# Patient Record
Sex: FEMALE | Race: WHITE | NOT HISPANIC OR LATINO | URBAN - METROPOLITAN AREA
[De-identification: names, ages, dates, MRNs, and addresses within clinical notes are randomized per-mention and may not be internally consistent; named-entity substitution may affect disease eponyms.]

---

## 2020-07-16 ENCOUNTER — INPATIENT (INPATIENT)
Facility: HOSPITAL | Age: 80
LOS: 4 days | Discharge: HOME | DRG: 645 | End: 2020-07-21
Attending: INTERNAL MEDICINE | Admitting: INTERNAL MEDICINE
Payer: MEDICARE

## 2020-07-16 VITALS
HEIGHT: 61 IN | SYSTOLIC BLOOD PRESSURE: 166 MMHG | RESPIRATION RATE: 20 BRPM | WEIGHT: 162.04 LBS | HEART RATE: 71 BPM | OXYGEN SATURATION: 99 % | TEMPERATURE: 99 F | DIASTOLIC BLOOD PRESSURE: 77 MMHG

## 2020-07-16 DIAGNOSIS — E87.1 HYPO-OSMOLALITY AND HYPONATREMIA: ICD-10-CM

## 2020-07-16 LAB
ALBUMIN SERPL ELPH-MCNC: 3.7 G/DL — SIGNIFICANT CHANGE UP (ref 3.5–5)
ALP SERPL-CCNC: 33 U/L — LOW (ref 40–120)
ALT FLD-CCNC: 23 U/L DA — SIGNIFICANT CHANGE UP (ref 10–60)
ANION GAP SERPL CALC-SCNC: 10 MMOL/L — SIGNIFICANT CHANGE UP (ref 5–17)
ANION GAP SERPL CALC-SCNC: 11 MMOL/L — SIGNIFICANT CHANGE UP (ref 5–17)
APPEARANCE UR: CLEAR — SIGNIFICANT CHANGE UP
AST SERPL-CCNC: 33 U/L — SIGNIFICANT CHANGE UP (ref 10–40)
BACTERIA # UR AUTO: ABNORMAL /HPF
BASOPHILS # BLD AUTO: 0.01 K/UL — SIGNIFICANT CHANGE UP (ref 0–0.2)
BASOPHILS NFR BLD AUTO: 0.2 % — SIGNIFICANT CHANGE UP (ref 0–2)
BILIRUB SERPL-MCNC: 0.8 MG/DL — SIGNIFICANT CHANGE UP (ref 0.2–1.2)
BILIRUB UR-MCNC: NEGATIVE — SIGNIFICANT CHANGE UP
BUN SERPL-MCNC: 6 MG/DL — LOW (ref 7–18)
BUN SERPL-MCNC: 7 MG/DL — SIGNIFICANT CHANGE UP (ref 7–18)
CALCIUM SERPL-MCNC: 8.3 MG/DL — LOW (ref 8.4–10.5)
CALCIUM SERPL-MCNC: 8.5 MG/DL — SIGNIFICANT CHANGE UP (ref 8.4–10.5)
CHLORIDE SERPL-SCNC: 80 MMOL/L — LOW (ref 96–108)
CHLORIDE SERPL-SCNC: 80 MMOL/L — LOW (ref 96–108)
CO2 SERPL-SCNC: 23 MMOL/L — SIGNIFICANT CHANGE UP (ref 22–31)
CO2 SERPL-SCNC: 25 MMOL/L — SIGNIFICANT CHANGE UP (ref 22–31)
COLOR SPEC: YELLOW — SIGNIFICANT CHANGE UP
CREAT ?TM UR-MCNC: 32 MG/DL — SIGNIFICANT CHANGE UP
CREAT SERPL-MCNC: 0.68 MG/DL — SIGNIFICANT CHANGE UP (ref 0.5–1.3)
CREAT SERPL-MCNC: 0.7 MG/DL — SIGNIFICANT CHANGE UP (ref 0.5–1.3)
DIFF PNL FLD: ABNORMAL
EOSINOPHIL # BLD AUTO: 0.02 K/UL — SIGNIFICANT CHANGE UP (ref 0–0.5)
EOSINOPHIL NFR BLD AUTO: 0.3 % — SIGNIFICANT CHANGE UP (ref 0–6)
EPI CELLS # UR: SIGNIFICANT CHANGE UP /HPF
GLUCOSE SERPL-MCNC: 115 MG/DL — HIGH (ref 70–99)
GLUCOSE SERPL-MCNC: 120 MG/DL — HIGH (ref 70–99)
GLUCOSE UR QL: NEGATIVE — SIGNIFICANT CHANGE UP
HCT VFR BLD CALC: 30.9 % — LOW (ref 34.5–45)
HGB BLD-MCNC: 10.8 G/DL — LOW (ref 11.5–15.5)
IMM GRANULOCYTES NFR BLD AUTO: 0.5 % — SIGNIFICANT CHANGE UP (ref 0–1.5)
KETONES UR-MCNC: ABNORMAL
LEUKOCYTE ESTERASE UR-ACNC: NEGATIVE — SIGNIFICANT CHANGE UP
LIDOCAIN IGE QN: 315 U/L — SIGNIFICANT CHANGE UP (ref 73–393)
LYMPHOCYTES # BLD AUTO: 1.24 K/UL — SIGNIFICANT CHANGE UP (ref 1–3.3)
LYMPHOCYTES # BLD AUTO: 20.1 % — SIGNIFICANT CHANGE UP (ref 13–44)
MCHC RBC-ENTMCNC: 28.5 PG — SIGNIFICANT CHANGE UP (ref 27–34)
MCHC RBC-ENTMCNC: 35 GM/DL — SIGNIFICANT CHANGE UP (ref 32–36)
MCV RBC AUTO: 81.5 FL — SIGNIFICANT CHANGE UP (ref 80–100)
MONOCYTES # BLD AUTO: 0.54 K/UL — SIGNIFICANT CHANGE UP (ref 0–0.9)
MONOCYTES NFR BLD AUTO: 8.8 % — SIGNIFICANT CHANGE UP (ref 2–14)
NEUTROPHILS # BLD AUTO: 4.32 K/UL — SIGNIFICANT CHANGE UP (ref 1.8–7.4)
NEUTROPHILS NFR BLD AUTO: 70.1 % — SIGNIFICANT CHANGE UP (ref 43–77)
NITRITE UR-MCNC: NEGATIVE — SIGNIFICANT CHANGE UP
NRBC # BLD: 0 /100 WBCS — SIGNIFICANT CHANGE UP (ref 0–0)
OSMOLALITY UR: 317 MOS/KG — SIGNIFICANT CHANGE UP (ref 50–1200)
PH UR: 7 — SIGNIFICANT CHANGE UP (ref 5–8)
PLATELET # BLD AUTO: 266 K/UL — SIGNIFICANT CHANGE UP (ref 150–400)
POTASSIUM SERPL-MCNC: 3.1 MMOL/L — LOW (ref 3.5–5.3)
POTASSIUM SERPL-MCNC: 3.2 MMOL/L — LOW (ref 3.5–5.3)
POTASSIUM SERPL-SCNC: 3.1 MMOL/L — LOW (ref 3.5–5.3)
POTASSIUM SERPL-SCNC: 3.2 MMOL/L — LOW (ref 3.5–5.3)
PROT SERPL-MCNC: 7.4 G/DL — SIGNIFICANT CHANGE UP (ref 6–8.3)
PROT UR-MCNC: NEGATIVE — SIGNIFICANT CHANGE UP
RBC # BLD: 3.79 M/UL — LOW (ref 3.8–5.2)
RBC # FLD: 12.1 % — SIGNIFICANT CHANGE UP (ref 10.3–14.5)
RBC CASTS # UR COMP ASSIST: SIGNIFICANT CHANGE UP /HPF (ref 0–2)
SARS-COV-2 RNA SPEC QL NAA+PROBE: SIGNIFICANT CHANGE UP
SODIUM SERPL-SCNC: 114 MMOL/L — CRITICAL LOW (ref 135–145)
SODIUM SERPL-SCNC: 115 MMOL/L — CRITICAL LOW (ref 135–145)
SODIUM UR-SCNC: 77 MMOL/L — SIGNIFICANT CHANGE UP
SP GR SPEC: 1 — LOW (ref 1.01–1.02)
URATE SERPL-MCNC: 2.2 MG/DL — LOW (ref 2.5–7)
UROBILINOGEN FLD QL: NEGATIVE — SIGNIFICANT CHANGE UP
WBC # BLD: 6.16 K/UL — SIGNIFICANT CHANGE UP (ref 3.8–10.5)
WBC # FLD AUTO: 6.16 K/UL — SIGNIFICANT CHANGE UP (ref 3.8–10.5)
WBC UR QL: SIGNIFICANT CHANGE UP /HPF (ref 0–5)

## 2020-07-16 PROCEDURE — 74177 CT ABD & PELVIS W/CONTRAST: CPT | Mod: 26

## 2020-07-16 PROCEDURE — 71045 X-RAY EXAM CHEST 1 VIEW: CPT | Mod: 26,CS

## 2020-07-16 PROCEDURE — 99291 CRITICAL CARE FIRST HOUR: CPT

## 2020-07-16 RX ORDER — PANTOPRAZOLE SODIUM 20 MG/1
40 TABLET, DELAYED RELEASE ORAL DAILY
Refills: 0 | Status: DISCONTINUED | OUTPATIENT
Start: 2020-07-16 | End: 2020-07-17

## 2020-07-16 RX ORDER — TIMOLOL 0.5 %
1 DROPS OPHTHALMIC (EYE)
Refills: 0 | Status: DISCONTINUED | OUTPATIENT
Start: 2020-07-16 | End: 2020-07-21

## 2020-07-16 RX ORDER — ONDANSETRON 8 MG/1
4 TABLET, FILM COATED ORAL ONCE
Refills: 0 | Status: COMPLETED | OUTPATIENT
Start: 2020-07-16 | End: 2020-07-16

## 2020-07-16 RX ORDER — SODIUM,POTASSIUM PHOSPHATES 278-250MG
1 POWDER IN PACKET (EA) ORAL
Refills: 0 | Status: DISCONTINUED | OUTPATIENT
Start: 2020-07-16 | End: 2020-07-17

## 2020-07-16 RX ORDER — APIXABAN 2.5 MG/1
5 TABLET, FILM COATED ORAL EVERY 12 HOURS
Refills: 0 | Status: DISCONTINUED | OUTPATIENT
Start: 2020-07-16 | End: 2020-07-21

## 2020-07-16 RX ORDER — AMLODIPINE BESYLATE 2.5 MG/1
5 TABLET ORAL DAILY
Refills: 0 | Status: DISCONTINUED | OUTPATIENT
Start: 2020-07-16 | End: 2020-07-17

## 2020-07-16 RX ORDER — LEVOTHYROXINE SODIUM 125 MCG
50 TABLET ORAL DAILY
Refills: 0 | Status: DISCONTINUED | OUTPATIENT
Start: 2020-07-16 | End: 2020-07-21

## 2020-07-16 RX ORDER — SOTALOL HCL 120 MG
80 TABLET ORAL
Refills: 0 | Status: DISCONTINUED | OUTPATIENT
Start: 2020-07-16 | End: 2020-07-17

## 2020-07-16 RX ORDER — POTASSIUM CHLORIDE 20 MEQ
40 PACKET (EA) ORAL ONCE
Refills: 0 | Status: COMPLETED | OUTPATIENT
Start: 2020-07-16 | End: 2020-07-16

## 2020-07-16 RX ORDER — SODIUM CHLORIDE 9 MG/ML
2 INJECTION INTRAMUSCULAR; INTRAVENOUS; SUBCUTANEOUS ONCE
Refills: 0 | Status: COMPLETED | OUTPATIENT
Start: 2020-07-16 | End: 2020-07-16

## 2020-07-16 RX ORDER — SODIUM CHLORIDE 9 MG/ML
1000 INJECTION INTRAMUSCULAR; INTRAVENOUS; SUBCUTANEOUS ONCE
Refills: 0 | Status: COMPLETED | OUTPATIENT
Start: 2020-07-16 | End: 2020-07-16

## 2020-07-16 RX ORDER — ONDANSETRON 8 MG/1
4 TABLET, FILM COATED ORAL THREE TIMES A DAY
Refills: 0 | Status: DISCONTINUED | OUTPATIENT
Start: 2020-07-16 | End: 2020-07-21

## 2020-07-16 RX ORDER — LATANOPROST 0.05 MG/ML
1 SOLUTION/ DROPS OPHTHALMIC; TOPICAL AT BEDTIME
Refills: 0 | Status: DISCONTINUED | OUTPATIENT
Start: 2020-07-16 | End: 2020-07-21

## 2020-07-16 RX ORDER — SODIUM CHLORIDE 9 MG/ML
1000 INJECTION INTRAMUSCULAR; INTRAVENOUS; SUBCUTANEOUS
Refills: 0 | Status: DISCONTINUED | OUTPATIENT
Start: 2020-07-16 | End: 2020-07-17

## 2020-07-16 RX ORDER — HEPARIN SODIUM 5000 [USP'U]/ML
5000 INJECTION INTRAVENOUS; SUBCUTANEOUS EVERY 8 HOURS
Refills: 0 | Status: DISCONTINUED | OUTPATIENT
Start: 2020-07-16 | End: 2020-07-16

## 2020-07-16 RX ADMIN — ONDANSETRON 4 MILLIGRAM(S): 8 TABLET, FILM COATED ORAL at 19:42

## 2020-07-16 RX ADMIN — SODIUM CHLORIDE 60 MILLILITER(S): 9 INJECTION INTRAMUSCULAR; INTRAVENOUS; SUBCUTANEOUS at 23:30

## 2020-07-16 RX ADMIN — SODIUM CHLORIDE 1000 MILLILITER(S): 9 INJECTION INTRAMUSCULAR; INTRAVENOUS; SUBCUTANEOUS at 19:41

## 2020-07-16 RX ADMIN — Medication 40 MILLIEQUIVALENT(S): at 20:40

## 2020-07-16 NOTE — ED PROVIDER NOTE - CLINICAL SUMMARY MEDICAL DECISION MAKING FREE TEXT BOX
80 yo F with vomiting and weakness. Nonfocal exam. Labs remarkable for Na of 115. K of 3.1.  Given 100-200 cc of NS. Discussed with ICU and will admit patient. No prior history. Endorsed to Dr. Rosales.

## 2020-07-16 NOTE — H&P ADULT - HISTORY OF PRESENT ILLNESS
79 years old [place] Islander speaking female from home with PMHx of HTN (on Losartan), HLP ( Simvastatin), presented to ED with severe nausea and vomiting x 2 days. Patient ate 'simbosa' from outside 2 days ago after that she has been vomiting and not able to hold up anything. Patient admits to having decreased PO intake because of vomiting. Patient denies having history of low serum sodium level or any recent change in medications. Patient does have some dizziness because of vomiting.  Patient denies any chest pain, shortness of breath, abdominal pain, change in urinary or bowel habits.  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 s/p 500 ml IV fluids in ED. Urine lytes shows dilute urine. serum osmolality sent. Will repeat BMP. Nephrology consult dr willard. 79 years old [place] speaking female from home with PMHx of HTN (on Losartan), HLP ( Simvastatin), presented to ED with severe nausea and vomiting x 2 days. Patient ate 'simbosa' from outside 2 days ago after that she has been vomiting and not able to hold up anything. Patient admits to having decreased PO intake because of vomiting. Patient denies having history of low serum sodium level or any recent change in medications. Patient does have some dizziness because of vomiting.  Patient denies any chest pain, shortness of breath, abdominal pain, change in urinary or bowel habits.  Patient is being admitted with hyponatremia with serum sodium of 114 mg/dl likely secondary to SIADH due to vomiting vs medication induced (HCTZ can cause increased urine sodium). s/p 500 ml IV fluids in ED. Urine lytes shows dilute urine. serum osmolality sent. Will repeat BMP. Nephrology consult dr willard. will hold hydrochlorothiazide

## 2020-07-16 NOTE — H&P ADULT - NSHPLABSRESULTS_GEN_ALL_CORE
EKG: showed normal sinus rhythm. Some baseline motion artifact nnoted.  CXR : pending. ordered. EKG: showed normal sinus rhythm. Some baseline motion artifact noted.  CXR : shows cardiomegaly. but lungs are clear with no infiultrates

## 2020-07-16 NOTE — ED PROVIDER NOTE - OBJECTIVE STATEMENT
78 y/o F patient with a significant PMHx of HTN presents to the ED c/o non bloody non bilious emesis x2 days. Patient states she had an Eastern European baked good and felt sick. Patient denies any diarrhea, notes having normal bowel movement but relates having an episode of vomiting this morning. Patient states she feels generalized weakness. Denies fevers, nausea, chest pain, shortness of breath, abdominal pain, dysuria, hematuria, diarrhea, constipation, or any other acute complaints. Translation services for [place] [id number] 80 y/o F patient with a significant PMHx of HTN presents to the ED c/o non bloody non bilious emesis x2 days. Patient states she had an Eastern European baked good with cheese and subsequently was feeling ill after. Patient denies any diarrhea, notes having normal bowel movement but relates having an episode of vomiting this morning. Patient states she feels generalized weakness. Denies fevers, nausea, chest pain, shortness of breath, abdominal pain, dysuria, hematuria, diarrhea, constipation, or any other acute complaints. Translation services for Lithuanian was utilized [facility] [id number])

## 2020-07-16 NOTE — H&P ADULT - ASSESSMENT
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 s/p 500 ml IV fluids in ED. Urine lytes shows dilute urine. serum osmolality sent. Will repeat BMP. Nephrology consult dr willard.    Assessment:  1. 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  2. Rule out intra-abdominal pathology for vomiting.  3. Hyperlipidemia  4. Hypertension.      Plan:    CNS:  No active issues at baseline.  Patient is alert oriented x 3 on my assessment.  Patient only felt mildly dizzy with vomiting earlier, currently asymptomatic.  Monitor for any seizures, convulsion, or change in mental status.      CVS;  #History of hypertension.  takes losartan and one more medication at home (does not remember the name).  blood pressure is on higher side.  will resume losartan.  monitor blood pressure.  #History of hyperlipidemia.  continue simvastatin  f/u lipid panel.  EKG showed normal sinus rhthym , on ischemic changes.      Respiratory:  No active issues.  patient is saturating 96 % on room air.      GI:  will keep NPO.  Will advance diet once serum sodium improves.      Nephrology:  Patient is being admitted with hyponatremia with serum sodium of 114 mg/dl likely secondary to SIADH due to vomiting.   s/p 500 ml IV fluids in ED.   Urine lytes shows dilute urine.   serum osmolality sent.   Will repeat BMP now .   BMP q 4 hourly.  Nephrology consult dr willard.  hypokalemia and hypophosphoremia  replaced.      Heam/onc:  patient has anemia.  no active signs of bleeding and no baseline to compare.  will monitor CBC.      ID:  No active issues.  will do ct scan of abdomen to rule out intra-abdominal pathology for persistent vomiting.      DVT prophylaxis:  Heparin sc for DVT prophylaxis.  Iv protonix while NPO.    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 s/p 500 ml IV fluids in ED. Urine lytes shows dilute urine. serum osmolality sent. Will repeat BMP. Nephrology consult dr willard.    Assessment:  1. 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  2. Rule out intra-abdominal pathology for vomiting.  3. Hyperlipidemia  4. Hypertension.      Plan:    CNS:  No active issues at baseline.  Patient is alert oriented x 3 on my assessment.  Patient only felt mildly dizzy with vomiting earlier, currently asymptomatic.  Monitor for any seizures, convulsion, or change in mental status.      CVS;  #History of hypertension.  takes losartan-hctz and amlodipine at home.  blood pressure is on higher side.  will resume amlodipine.  will hold hydrochlorothiazide.  monitor blood pressure.  #History of hyperlipidemia.  continue simvastatin  f/u lipid panel.  EKG showed normal sinus rhthym , on ischemic changes.  Patient is on Eliquis for 'heart problem' per family.  will resume.      Respiratory:  No active issues.  patient is saturating 96 % on room air.      GI:  will keep NPO.  Will advance diet once serum sodium improves.      Nephrology:  Patient is being admitted with hyponatremia with serum sodium of 114 mg/dl likely secondary  SIADH due to vomiting vs medication induced (HCTZ can cause increased urine sodium).   Urine lytes shows dilute urine.   serum osmolality sent.   Will repeat BMP now .   BMP q 4 hourly.  Nephrology consult dr willard.  hypokalemia and hypophosphoremia  replaced.  will hold HCTZ      Heam/onc:  patient has anemia.  no active signs of bleeding and no baseline to compare.  will monitor CBC.      ID:  No active issues.  will do ct scan of abdomen to rule out intra-abdominal pathology for persistent vomiting.      DVT prophylaxis:  Heparin sc for DVT prophylaxis.  Iv protonix while NPO.    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 s/p 500 ml IV fluids in ED. Urine lytes shows dilute urine. serum osmolality sent. Will repeat BMP. Nephrology consult dr willard.    Assessment:  1. 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  2. Rule out intra-abdominal pathology for vomiting.  3. Hyperlipidemia  4. Hypertension.      Plan:    CNS:  No active issues at baseline.  Patient is alert oriented x 3 on my assessment.  Patient only felt mildly dizzy with vomiting earlier, currently asymptomatic.  Monitor for any seizures, convulsion, or change in mental status.      CVS;  #History of hypertension.  takes losartan-hctz and amlodipine at home.  blood pressure is on higher side.  will resume amlodipine.  will hold hydrochlorothiazide.  monitor blood pressure.  #History of hyperlipidemia.  continue simvastatin  f/u lipid panel.  EKG showed normal sinus rhthym , no ischemic changes.  Patient is on Eliquis for 'heart problem' per family.  will resume.      Respiratory:  No active issues.  patient is saturating 96 % on room air.      GI:  will keep NPO.  Will advance diet once serum sodium improves.      Nephrology:  Patient is being admitted with hyponatremia with serum sodium of 114 mg/dl likely secondary  SIADH due to vomiting vs medication induced (HCTZ can cause increased urine sodium).   Urine lytes shows dilute urine.   serum osmolality sent.   Will repeat BMP now .   BMP q 4 hourly.  Nephrology consult dr willard.  hypokalemia and hypophosphoremia  replaced.  will hold HCTZ      Heam/onc:  patient has anemia.  no active signs of bleeding and no baseline to compare.  will monitor CBC.      ID:  No active issues.  will do ct scan of abdomen to rule out intra-abdominal pathology for persistent vomiting.      DVT prophylaxis:  Heparin sc for DVT prophylaxis.  Iv protonix while NPO.    Dispo:  ICU

## 2020-07-16 NOTE — H&P ADULT - NSHPPHYSICALEXAM_GEN_ALL_CORE
Vital Signs Last 24 Hrs  T(C): 37.3 (16 Jul 2020 17:47), Max: 37.3 (16 Jul 2020 17:47)  T(F): 99.1 (16 Jul 2020 17:47), Max: 99.1 (16 Jul 2020 17:47)  HR: 71 (16 Jul 2020 17:47) (71 - 71)  BP: 166/77 (16 Jul 2020 17:47) (166/77 - 166/77)  BP(mean): --  RR: 20 (16 Jul 2020 17:47) (20 - 20)  SpO2: 99% (16 Jul 2020 17:47) (99% - 99%)  PHYSICAL EXAM:  GENERAL: NAD, speaks in full sentences, no signs of respiratory distress  HEAD:  Atraumatic, Normocephalic  EYES: EOMI, PERRLA, conjunctiva and sclera clear  NECK: Supple, No JVD  CHEST/LUNG: Clear to auscultation bilaterally; No wheeze; No crackles; No accessory muscles used  HEART: Regular rate and rhythm; No murmurs;   ABDOMEN: Soft, Nontender, Nondistended; Bowel sounds present; No guarding  EXTREMITIES:  2+ Peripheral Pulses, edema +1  PSYCH: AAOx3  NEUROLOGY: non-focal  SKIN: No rashes or lesions

## 2020-07-16 NOTE — H&P ADULT - ATTENDING COMMENTS
MICU ATTENDING. I have personally seen and examined the pt.  I was physically present for the key elements service provided. I agree with above history, physical and plan which I edited where appropriate. I total spent 35 min critical care time.   80 y/o with PMH of HTN and HLD presented with nausea and vomiting and hypoNa.   A/P hypoNa 2 to hypovolemia, medication induced vs SIADH    nausea, vomiting r/o intraabd pathology  HTN  hydration   Monitor serum Na closely   avoid rapid correction  nephrology input appreciated  CT abd  protonix, Zofran  Hold HCTZ and Losartan  Cont other meds

## 2020-07-17 LAB
A1C WITH ESTIMATED AVERAGE GLUCOSE RESULT: 5.7 % — HIGH (ref 4–5.6)
ALBUMIN SERPL ELPH-MCNC: 3.5 G/DL — SIGNIFICANT CHANGE UP (ref 3.5–5)
ALP SERPL-CCNC: 35 U/L — LOW (ref 40–120)
ALT FLD-CCNC: 32 U/L DA — SIGNIFICANT CHANGE UP (ref 10–60)
ANION GAP SERPL CALC-SCNC: 5 MMOL/L — SIGNIFICANT CHANGE UP (ref 5–17)
ANION GAP SERPL CALC-SCNC: 6 MMOL/L — SIGNIFICANT CHANGE UP (ref 5–17)
ANION GAP SERPL CALC-SCNC: 7 MMOL/L — SIGNIFICANT CHANGE UP (ref 5–17)
ANION GAP SERPL CALC-SCNC: 7 MMOL/L — SIGNIFICANT CHANGE UP (ref 5–17)
ANION GAP SERPL CALC-SCNC: 8 MMOL/L — SIGNIFICANT CHANGE UP (ref 5–17)
AST SERPL-CCNC: 48 U/L — HIGH (ref 10–40)
BILIRUB SERPL-MCNC: 0.5 MG/DL — SIGNIFICANT CHANGE UP (ref 0.2–1.2)
BUN SERPL-MCNC: 15 MG/DL — SIGNIFICANT CHANGE UP (ref 7–18)
BUN SERPL-MCNC: 16 MG/DL — SIGNIFICANT CHANGE UP (ref 7–18)
BUN SERPL-MCNC: 4 MG/DL — LOW (ref 7–18)
BUN SERPL-MCNC: 5 MG/DL — LOW (ref 7–18)
BUN SERPL-MCNC: 8 MG/DL — SIGNIFICANT CHANGE UP (ref 7–18)
CALCIUM SERPL-MCNC: 8.3 MG/DL — LOW (ref 8.4–10.5)
CALCIUM SERPL-MCNC: 8.3 MG/DL — LOW (ref 8.4–10.5)
CALCIUM SERPL-MCNC: 8.6 MG/DL — SIGNIFICANT CHANGE UP (ref 8.4–10.5)
CALCIUM SERPL-MCNC: 8.6 MG/DL — SIGNIFICANT CHANGE UP (ref 8.4–10.5)
CALCIUM SERPL-MCNC: 8.8 MG/DL — SIGNIFICANT CHANGE UP (ref 8.4–10.5)
CHLORIDE SERPL-SCNC: 100 MMOL/L — SIGNIFICANT CHANGE UP (ref 96–108)
CHLORIDE SERPL-SCNC: 91 MMOL/L — LOW (ref 96–108)
CHLORIDE SERPL-SCNC: 94 MMOL/L — LOW (ref 96–108)
CHLORIDE SERPL-SCNC: 95 MMOL/L — LOW (ref 96–108)
CHLORIDE SERPL-SCNC: 99 MMOL/L — SIGNIFICANT CHANGE UP (ref 96–108)
CHOLEST SERPL-MCNC: 198 MG/DL — SIGNIFICANT CHANGE UP (ref 10–199)
CO2 SERPL-SCNC: 25 MMOL/L — SIGNIFICANT CHANGE UP (ref 22–31)
CO2 SERPL-SCNC: 26 MMOL/L — SIGNIFICANT CHANGE UP (ref 22–31)
CO2 SERPL-SCNC: 26 MMOL/L — SIGNIFICANT CHANGE UP (ref 22–31)
CO2 SERPL-SCNC: 27 MMOL/L — SIGNIFICANT CHANGE UP (ref 22–31)
CO2 SERPL-SCNC: 28 MMOL/L — SIGNIFICANT CHANGE UP (ref 22–31)
CREAT ?TM UR-MCNC: <13 MG/DL — SIGNIFICANT CHANGE UP
CREAT SERPL-MCNC: 0.64 MG/DL — SIGNIFICANT CHANGE UP (ref 0.5–1.3)
CREAT SERPL-MCNC: 0.7 MG/DL — SIGNIFICANT CHANGE UP (ref 0.5–1.3)
CREAT SERPL-MCNC: 0.73 MG/DL — SIGNIFICANT CHANGE UP (ref 0.5–1.3)
CREAT SERPL-MCNC: 0.78 MG/DL — SIGNIFICANT CHANGE UP (ref 0.5–1.3)
CREAT SERPL-MCNC: 0.92 MG/DL — SIGNIFICANT CHANGE UP (ref 0.5–1.3)
ESTIMATED AVERAGE GLUCOSE: 117 MG/DL — HIGH (ref 68–114)
GLUCOSE SERPL-MCNC: 105 MG/DL — HIGH (ref 70–99)
GLUCOSE SERPL-MCNC: 106 MG/DL — HIGH (ref 70–99)
GLUCOSE SERPL-MCNC: 91 MG/DL — SIGNIFICANT CHANGE UP (ref 70–99)
GLUCOSE SERPL-MCNC: 95 MG/DL — SIGNIFICANT CHANGE UP (ref 70–99)
GLUCOSE SERPL-MCNC: 95 MG/DL — SIGNIFICANT CHANGE UP (ref 70–99)
HCT VFR BLD CALC: 32.8 % — LOW (ref 34.5–45)
HDLC SERPL-MCNC: 77 MG/DL — SIGNIFICANT CHANGE UP
HGB BLD-MCNC: 11.7 G/DL — SIGNIFICANT CHANGE UP (ref 11.5–15.5)
LIPID PNL WITH DIRECT LDL SERPL: 108 MG/DL — SIGNIFICANT CHANGE UP
MAGNESIUM SERPL-MCNC: 2 MG/DL — SIGNIFICANT CHANGE UP (ref 1.6–2.6)
MAGNESIUM SERPL-MCNC: 2.3 MG/DL — SIGNIFICANT CHANGE UP (ref 1.6–2.6)
MAGNESIUM SERPL-MCNC: 2.3 MG/DL — SIGNIFICANT CHANGE UP (ref 1.6–2.6)
MCHC RBC-ENTMCNC: 29 PG — SIGNIFICANT CHANGE UP (ref 27–34)
MCHC RBC-ENTMCNC: 35.7 GM/DL — SIGNIFICANT CHANGE UP (ref 32–36)
MCV RBC AUTO: 81.2 FL — SIGNIFICANT CHANGE UP (ref 80–100)
NRBC # BLD: 0 /100 WBCS — SIGNIFICANT CHANGE UP (ref 0–0)
OSMOLALITY SERPL: 259 MOSMOL/KG — LOW (ref 280–301)
OSMOLALITY UR: 102 MOS/KG — SIGNIFICANT CHANGE UP (ref 50–1200)
PHOSPHATE SERPL-MCNC: 1.9 MG/DL — LOW (ref 2.5–4.5)
PHOSPHATE SERPL-MCNC: 1.9 MG/DL — LOW (ref 2.5–4.5)
PHOSPHATE SERPL-MCNC: 2.1 MG/DL — LOW (ref 2.5–4.5)
PLATELET # BLD AUTO: 264 K/UL — SIGNIFICANT CHANGE UP (ref 150–400)
POTASSIUM SERPL-MCNC: 3.4 MMOL/L — LOW (ref 3.5–5.3)
POTASSIUM SERPL-MCNC: 3.6 MMOL/L — SIGNIFICANT CHANGE UP (ref 3.5–5.3)
POTASSIUM SERPL-MCNC: 4.5 MMOL/L — SIGNIFICANT CHANGE UP (ref 3.5–5.3)
POTASSIUM SERPL-MCNC: 4.8 MMOL/L — SIGNIFICANT CHANGE UP (ref 3.5–5.3)
POTASSIUM SERPL-MCNC: 4.8 MMOL/L — SIGNIFICANT CHANGE UP (ref 3.5–5.3)
POTASSIUM SERPL-SCNC: 3.4 MMOL/L — LOW (ref 3.5–5.3)
POTASSIUM SERPL-SCNC: 3.6 MMOL/L — SIGNIFICANT CHANGE UP (ref 3.5–5.3)
POTASSIUM SERPL-SCNC: 4.5 MMOL/L — SIGNIFICANT CHANGE UP (ref 3.5–5.3)
POTASSIUM SERPL-SCNC: 4.8 MMOL/L — SIGNIFICANT CHANGE UP (ref 3.5–5.3)
POTASSIUM SERPL-SCNC: 4.8 MMOL/L — SIGNIFICANT CHANGE UP (ref 3.5–5.3)
PROT SERPL-MCNC: 7.1 G/DL — SIGNIFICANT CHANGE UP (ref 6–8.3)
RBC # BLD: 4.04 M/UL — SIGNIFICANT CHANGE UP (ref 3.8–5.2)
RBC # FLD: 12.2 % — SIGNIFICANT CHANGE UP (ref 10.3–14.5)
SODIUM SERPL-SCNC: 124 MMOL/L — LOW (ref 135–145)
SODIUM SERPL-SCNC: 127 MMOL/L — LOW (ref 135–145)
SODIUM SERPL-SCNC: 129 MMOL/L — LOW (ref 135–145)
SODIUM SERPL-SCNC: 132 MMOL/L — LOW (ref 135–145)
SODIUM SERPL-SCNC: 132 MMOL/L — LOW (ref 135–145)
SODIUM UR-SCNC: 26 MMOL/L — SIGNIFICANT CHANGE UP
TOTAL CHOLESTEROL/HDL RATIO MEASUREMENT: 2.6 RATIO — LOW (ref 3.3–7.1)
TRIGL SERPL-MCNC: 67 MG/DL — SIGNIFICANT CHANGE UP (ref 10–149)
VIT B12 SERPL-MCNC: 530 PG/ML — SIGNIFICANT CHANGE UP (ref 232–1245)
WBC # BLD: 6.46 K/UL — SIGNIFICANT CHANGE UP (ref 3.8–10.5)
WBC # FLD AUTO: 6.46 K/UL — SIGNIFICANT CHANGE UP (ref 3.8–10.5)

## 2020-07-17 PROCEDURE — 99222 1ST HOSP IP/OBS MODERATE 55: CPT | Mod: GC

## 2020-07-17 RX ORDER — POTASSIUM PHOSPHATE, MONOBASIC POTASSIUM PHOSPHATE, DIBASIC 236; 224 MG/ML; MG/ML
15 INJECTION, SOLUTION INTRAVENOUS ONCE
Refills: 0 | Status: DISCONTINUED | OUTPATIENT
Start: 2020-07-17 | End: 2020-07-17

## 2020-07-17 RX ORDER — PHENYLEPHRINE HYDROCHLORIDE 10 MG/ML
0.3 INJECTION INTRAVENOUS
Qty: 40 | Refills: 0 | Status: DISCONTINUED | OUTPATIENT
Start: 2020-07-17 | End: 2020-07-18

## 2020-07-17 RX ORDER — SOTALOL HCL 120 MG
80 TABLET ORAL
Refills: 0 | Status: DISCONTINUED | OUTPATIENT
Start: 2020-07-17 | End: 2020-07-21

## 2020-07-17 RX ORDER — POTASSIUM PHOSPHATE, MONOBASIC POTASSIUM PHOSPHATE, DIBASIC 236; 224 MG/ML; MG/ML
15 INJECTION, SOLUTION INTRAVENOUS ONCE
Refills: 0 | Status: COMPLETED | OUTPATIENT
Start: 2020-07-17 | End: 2020-07-17

## 2020-07-17 RX ORDER — DESMOPRESSIN ACETATE 0.1 MG/1
2 TABLET ORAL ONCE
Refills: 0 | Status: COMPLETED | OUTPATIENT
Start: 2020-07-17 | End: 2020-07-17

## 2020-07-17 RX ORDER — SODIUM CHLORIDE 9 MG/ML
1000 INJECTION, SOLUTION INTRAVENOUS
Refills: 0 | Status: DISCONTINUED | OUTPATIENT
Start: 2020-07-17 | End: 2020-07-18

## 2020-07-17 RX ORDER — POTASSIUM CHLORIDE 20 MEQ
40 PACKET (EA) ORAL EVERY 4 HOURS
Refills: 0 | Status: COMPLETED | OUTPATIENT
Start: 2020-07-17 | End: 2020-07-17

## 2020-07-17 RX ORDER — CHLORHEXIDINE GLUCONATE 213 G/1000ML
1 SOLUTION TOPICAL
Refills: 0 | Status: DISCONTINUED | OUTPATIENT
Start: 2020-07-17 | End: 2020-07-18

## 2020-07-17 RX ADMIN — Medication 1 PACKET(S): at 01:20

## 2020-07-17 RX ADMIN — Medication 40 MILLIEQUIVALENT(S): at 06:31

## 2020-07-17 RX ADMIN — Medication 50 MICROGRAM(S): at 06:17

## 2020-07-17 RX ADMIN — Medication 1 PACKET(S): at 06:18

## 2020-07-17 RX ADMIN — Medication 1 DROP(S): at 06:18

## 2020-07-17 RX ADMIN — APIXABAN 5 MILLIGRAM(S): 2.5 TABLET, FILM COATED ORAL at 18:59

## 2020-07-17 RX ADMIN — Medication 40 MILLIEQUIVALENT(S): at 11:17

## 2020-07-17 RX ADMIN — Medication 1 DROP(S): at 18:36

## 2020-07-17 RX ADMIN — SODIUM CHLORIDE 150 MILLILITER(S): 9 INJECTION, SOLUTION INTRAVENOUS at 04:20

## 2020-07-17 RX ADMIN — SODIUM CHLORIDE 150 MILLILITER(S): 9 INJECTION, SOLUTION INTRAVENOUS at 23:00

## 2020-07-17 RX ADMIN — POTASSIUM PHOSPHATE, MONOBASIC POTASSIUM PHOSPHATE, DIBASIC 62.5 MILLIMOLE(S): 236; 224 INJECTION, SOLUTION INTRAVENOUS at 17:49

## 2020-07-17 RX ADMIN — SODIUM CHLORIDE 2 GRAM(S): 9 INJECTION INTRAMUSCULAR; INTRAVENOUS; SUBCUTANEOUS at 01:21

## 2020-07-17 RX ADMIN — APIXABAN 5 MILLIGRAM(S): 2.5 TABLET, FILM COATED ORAL at 06:18

## 2020-07-17 RX ADMIN — Medication 80 MILLIGRAM(S): at 06:18

## 2020-07-17 RX ADMIN — DESMOPRESSIN ACETATE 2 MICROGRAM(S): 0.1 TABLET ORAL at 16:54

## 2020-07-17 RX ADMIN — LATANOPROST 1 DROP(S): 0.05 SOLUTION/ DROPS OPHTHALMIC; TOPICAL at 22:59

## 2020-07-17 RX ADMIN — AMLODIPINE BESYLATE 5 MILLIGRAM(S): 2.5 TABLET ORAL at 01:19

## 2020-07-17 NOTE — DIETITIAN INITIAL EVALUATION ADULT. - OTHER INFO
Pt seen, adult granddaughter provides info. No weight changes, no chew/ swallowing issues, NKFA, no food requests. Discussed diet order (discussed diet with PGY 2, maintaining current order).

## 2020-07-17 NOTE — DIETITIAN INITIAL EVALUATION ADULT. - PERTINENT LABORATORY DATA
07-17 Na129 mmol/L<L> Glu 105 mg/dL<H> K+ 4.5 mmol/L Cr  0.73 mg/dL BUN 8 mg/dL 07-17 Phos 1.9 mg/dL<L> 07-17 Alb 3.5 g/dL 07-17 Chol 198 mg/dL  mg/dL HDL 77 mg/dL Trig 67 mg/dL

## 2020-07-17 NOTE — PROGRESS NOTE ADULT - SUBJECTIVE AND OBJECTIVE BOX
INTERVAL HPI/OVERNIGHT EVENTS: ***      Antimicrobial:    Cardiovascular:  amLODIPine   Tablet 5 milliGRAM(s) Oral daily  sotalol 80 milliGRAM(s) Oral two times a day    Pulmonary:    Hematalogic:  apixaban 5 milliGRAM(s) Oral every 12 hours    Other:  dextrose 5%. 1000 milliLiter(s) IV Continuous <Continuous>  latanoprost 0.005% Ophthalmic Solution 1 Drop(s) Both EYES at bedtime  levothyroxine 50 MICROGram(s) Oral daily  ondansetron Injectable 4 milliGRAM(s) IV Push three times a day PRN  pantoprazole  Injectable 40 milliGRAM(s) IV Push daily  potassium chloride    Tablet ER 40 milliEquivalent(s) Oral every 4 hours  potassium phosphate / sodium phosphate Powder (PHOS-NaK) 1 Packet(s) Oral four times a day  timolol 0.5% Solution 1 Drop(s) Both EYES two times a day    amLODIPine   Tablet 5 milliGRAM(s) Oral daily  apixaban 5 milliGRAM(s) Oral every 12 hours  dextrose 5%. 1000 milliLiter(s) IV Continuous <Continuous>  latanoprost 0.005% Ophthalmic Solution 1 Drop(s) Both EYES at bedtime  levothyroxine 50 MICROGram(s) Oral daily  ondansetron Injectable 4 milliGRAM(s) IV Push three times a day PRN  pantoprazole  Injectable 40 milliGRAM(s) IV Push daily  potassium chloride    Tablet ER 40 milliEquivalent(s) Oral every 4 hours  potassium phosphate / sodium phosphate Powder (PHOS-NaK) 1 Packet(s) Oral four times a day  sotalol 80 milliGRAM(s) Oral two times a day  timolol 0.5% Solution 1 Drop(s) Both EYES two times a day    Drug Dosing Weight  Height (cm): 154.94 (2020 17:47)  Weight (kg): 72.7 (2020 23:30)  BMI (kg/m2): 30.3 (2020 23:30)  BSA (m2): 1.72 (2020 23:30)    PRESSORS: [ ] YES [ ]No  WHICH:    CENTRAL LINE: [ ] YES [ ] NO  LOCATION:   DATE INSERTED:  REMOVE: [ ] YES [ ] NO  EXPLAIN:    ARROYO: [ ] YES [ ] NO    DATE INSERTED:  REMOVE:  [ ] YES [ ] NO  EXPLAIN:    A-LINE:  [ ] YES [ ] NO  LOCATION:   DATE INSERTED:  REMOVE:  [ ] YES [ ] NO  EXPLAIN:    PMH -reviewed admission note, no change since admission  PAST MEDICAL & SURGICAL HISTORY:  HTN (hypertension)  No significant past surgical history      ICU Vital Signs Last 24 Hrs  T(C): 36.3 (2020 05:13), Max: 37.3 (2020 17:47)  T(F): 97.3 (2020 05:13), Max: 99.1 (2020 17:47)  HR: 64 (2020 07:00) (63 - 75)  BP: 125/60 (2020 07:00) (111/61 - 180/66)  BP(mean): 75 (2020 07:00) (63 - 115)  ABP: --  ABP(mean): --  RR: 16 (2020 07:00) (12 - 29)  SpO2: 96% (2020 07:00) (96% - 100%)            07-16 @ 07:01  -  -17 @ 07:00  --------------------------------------------------------  IN: 750 mL / OUT: 750 mL / NET: 0 mL            PHYSICAL EXAM:    GENERAL: NAD, well-groomed, well-developed  HEAD:  Atraumatic, Normocephalic  EYES: EOMI, PERRLA, conjunctiva and sclera clear  ENMT: No tonsillar erythema, exudates, or enlargement; Moist mucous membranes, Good dentition, No lesions  NECK: Supple, normal appearance, No JVD; Normal thyroid; Trachea midline  NERVOUS SYSTEM:  Alert & Oriented X3, Good concentration; Motor Strength 5/5 B/L upper and lower extremities; DTRs 2+ intact and symmetric  CHEST/LUNG: No chest deformity; Normal percussion bilaterally; No rales, rhonchi, wheezing   HEART: Regular rate and rhythm; No murmurs, rubs, or gallops  ABDOMEN: Soft, Nontender, Nondistended; Bowel sounds present  EXTREMITIES:  2+ Peripheral Pulses, No clubbing, cyanosis, or edema  LYMPH: No lymphadenopathy noted  SKIN: No rashes or lesions; Good capillary refill      LABS:  CBC Full  -  ( 2020 03:46 )  WBC Count : 6.46 K/uL  RBC Count : 4.04 M/uL  Hemoglobin : 11.7 g/dL  Hematocrit : 32.8 %  Platelet Count - Automated : 264 K/uL  Mean Cell Volume : 81.2 fl  Mean Cell Hemoglobin : 29.0 pg  Mean Cell Hemoglobin Concentration : 35.7 gm/dL  Auto Neutrophil # : x  Auto Lymphocyte # : x  Auto Monocyte # : x  Auto Eosinophil # : x  Auto Basophil # : x  Auto Neutrophil % : x  Auto Lymphocyte % : x  Auto Monocyte % : x  Auto Eosinophil % : x  Auto Basophil % : x    07-    124<L>  |  91<L>  |  5<L>  ----------------------------<  95  3.4<L>   |  25  |  0.70    Ca    8.3<L>      2020 03:46  Phos  1.9       Mg     2.0         TPro  7.4  /  Alb  3.7  /  TBili  0.8  /  DBili  x   /  AST  33  /  ALT  23  /  AlkPhos  33<L>  07-16      Urinalysis Basic - ( 2020 20:15 )    Color: Yellow / Appearance: Clear / S.005 / pH: x  Gluc: x / Ketone: Moderate  / Bili: Negative / Urobili: Negative   Blood: x / Protein: Negative / Nitrite: Negative   Leuk Esterase: Negative / RBC: 0-2 /HPF / WBC 0-2 /HPF   Sq Epi: x / Non Sq Epi: Few /HPF / Bacteria: Trace /HPF          RADIOLOGY & ADDITIONAL STUDIES REVIEWED:  ***    [ ]GOALS OF CARE DISCUSSION WITH PATIENT/FAMILY/PROXY:    CRITICAL CARE TIME SPENT: 35 minutes INTERVAL HPI/OVERNIGHT EVENTS:     - Pt was examed via Martiniquais  Rayo #[id number]  - pt denied n/v or headache. Pt is A&O x3, no abd pain      Antimicrobial:    Cardiovascular:  amLODIPine   Tablet 5 milliGRAM(s) Oral daily  sotalol 80 milliGRAM(s) Oral two times a day    Pulmonary:    Hematalogic:  apixaban 5 milliGRAM(s) Oral every 12 hours    Other:  dextrose 5%. 1000 milliLiter(s) IV Continuous <Continuous>  latanoprost 0.005% Ophthalmic Solution 1 Drop(s) Both EYES at bedtime  levothyroxine 50 MICROGram(s) Oral daily  ondansetron Injectable 4 milliGRAM(s) IV Push three times a day PRN  pantoprazole  Injectable 40 milliGRAM(s) IV Push daily  potassium chloride    Tablet ER 40 milliEquivalent(s) Oral every 4 hours  potassium phosphate / sodium phosphate Powder (PHOS-NaK) 1 Packet(s) Oral four times a day  timolol 0.5% Solution 1 Drop(s) Both EYES two times a day    amLODIPine   Tablet 5 milliGRAM(s) Oral daily  apixaban 5 milliGRAM(s) Oral every 12 hours  dextrose 5%. 1000 milliLiter(s) IV Continuous <Continuous>  latanoprost 0.005% Ophthalmic Solution 1 Drop(s) Both EYES at bedtime  levothyroxine 50 MICROGram(s) Oral daily  ondansetron Injectable 4 milliGRAM(s) IV Push three times a day PRN  pantoprazole  Injectable 40 milliGRAM(s) IV Push daily  potassium chloride    Tablet ER 40 milliEquivalent(s) Oral every 4 hours  potassium phosphate / sodium phosphate Powder (PHOS-NaK) 1 Packet(s) Oral four times a day  sotalol 80 milliGRAM(s) Oral two times a day  timolol 0.5% Solution 1 Drop(s) Both EYES two times a day    Drug Dosing Weight  Height (cm): 154.94 (2020 17:47)  Weight (kg): 72.7 (2020 23:30)  BMI (kg/m2): 30.3 (2020 23:30)  BSA (m2): 1.72 (2020 23:30)    PRESSORS: [ ] YES [x ]No  WHICH:    CENTRAL LINE: [ ] YES [x ] NO  LOCATION:   DATE INSERTED:  REMOVE: [ ] YES [ ] NO  EXPLAIN:    ARROYO: [ ] YES [x ] NO    DATE INSERTED:  REMOVE:  [ ] YES [ ] NO  EXPLAIN:    A-LINE:  [ ] YES [x ] NO  LOCATION:   DATE INSERTED:  REMOVE:  [ ] YES [ ] NO  EXPLAIN:    PMH -reviewed admission note, no change since admission  PAST MEDICAL & SURGICAL HISTORY:  HTN (hypertension)  No significant past surgical history      ICU Vital Signs Last 24 Hrs  T(C): 36.3 (2020 05:13), Max: 37.3 (2020 17:47)  T(F): 97.3 (2020 05:13), Max: 99.1 (2020 17:47)  HR: 64 (2020 07:00) (63 - 75)  BP: 125/60 (2020 07:00) (111/61 - 180/66)  BP(mean): 75 (2020 07:00) (63 - 115)  ABP: --  ABP(mean): --  RR: 16 (2020 07:00) (12 - 29)  SpO2: 96% (2020 07:00) (96% - 100%)            -16 @ 07:01  -  -17 @ 07:00  --------------------------------------------------------  IN: 750 mL / OUT: 750 mL / NET: 0 mL            PHYSICAL EXAM:    GENERAL: NAD, well-groomed, obese  HEAD:  Atraumatic, Normocephalic  EYES: EOMI, PERRLA, conjunctiva and sclera clear  ENMT: No tonsillar erythema, exudates, or enlargement; Moist mucous membranes, Good dentition, No lesions  NECK: Supple, normal appearance, No JVD; Normal thyroid; Trachea midline  NERVOUS SYSTEM:  Alert & Oriented X3, Good concentration; Motor Strength 5/5 B/L upper and lower extremities  CHEST/LUNG: No chest deformity; Normal percussion bilaterally; No rales, rhonchi, wheezing   HEART: Regular rate and rhythm; No murmurs, rubs, or gallops  ABDOMEN: Soft, Nontender, Nondistended; Bowel sounds decreased but present  EXTREMITIES:  2+ Peripheral Pulses, No clubbing, cyanosis, or edema  LYMPH: No lymphadenopathy noted  SKIN: No rashes or lesions; Good capillary refill      LABS:  CBC Full  -  ( 2020 03:46 )  WBC Count : 6.46 K/uL  RBC Count : 4.04 M/uL  Hemoglobin : 11.7 g/dL  Hematocrit : 32.8 %  Platelet Count - Automated : 264 K/uL  Mean Cell Volume : 81.2 fl  Mean Cell Hemoglobin : 29.0 pg  Mean Cell Hemoglobin Concentration : 35.7 gm/dL  Auto Neutrophil # : x  Auto Lymphocyte # : x  Auto Monocyte # : x  Auto Eosinophil # : x  Auto Basophil # : x  Auto Neutrophil % : x  Auto Lymphocyte % : x  Auto Monocyte % : x  Auto Eosinophil % : x  Auto Basophil % : x        124<L>  |  91<L>  |  5<L>  ----------------------------<  95  3.4<L>   |  25  |  0.70    Ca    8.3<L>      2020 03:46  Phos  1.9       Mg     2.0         TPro  7.4  /  Alb  3.7  /  TBili  0.8  /  DBili  x   /  AST  33  /  ALT  23  /  AlkPhos  33<L>        Urinalysis Basic - ( 2020 20:15 )    Color: Yellow / Appearance: Clear / S.005 / pH: x  Gluc: x / Ketone: Moderate  / Bili: Negative / Urobili: Negative   Blood: x / Protein: Negative / Nitrite: Negative   Leuk Esterase: Negative / RBC: 0-2 /HPF / WBC 0-2 /HPF   Sq Epi: x / Non Sq Epi: Few /HPF / Bacteria: Trace /HPF          RADIOLOGY & ADDITIONAL STUDIES REVIEWED:       < from: CT Abdomen and Pelvis w/ IV Cont (20 @ 23:18) >  FINDINGS:  LOWER CHEST: Within normal limits.    LIVER: Scattered hypodense lesions, likely cysts. .   BILE DUCTS: Normal caliber.  GALLBLADDER: Within normal limits.  SPLEEN: Within normal limits.  PANCREAS: Within normal limits.  ADRENALS: Within normal limits.  KIDNEYS/URETERS: Within normal limits.    BLADDER: Within normal limits.  REPRODUCTIVE ORGANS: Within normal limits.    BOWEL: No bowel obstruction. Appendix is not visualized. Colonic diverticulosis without diverticulitis.  PERITONEUM: No ascites. Scattered metallic densities around the gallbladder and in the right lower quadrant (4-24, 4-32)  VESSELS: Moderate atherosclerotic calcification.  RETROPERITONEUM/LYMPH NODES: No lymphadenopathy.    ABDOMINAL WALL: Within normal limits.  BONES: Diffuse osteopenia. Accentuated lumbar lordosis. Severe facet joint arthrosis at L5-S1.    IMPRESSION: No acute intra-abdominal abnormality to elucidate the presenting symptoms.      < end of copied text >      [ ]GOALS OF CARE DISCUSSION WITH PATIENT/FAMILY/PROXY:    CRITICAL CARE TIME SPENT: 35 minutes

## 2020-07-17 NOTE — CONSULT NOTE ADULT - ASSESSMENT
Patient is a 80yo Wallisian speaking Female with HTN on Losartan/ HCTZ, HLD p/w n/v x 2 days. Pt found to have serum Na 115. Nephrology consulted for hyponatremia    1. Hyponatremia- likely hypovolemic hyponatremia due to n/v and HCTZ use. Agree with holding HCTZ. Now with rapid increasing in serum Na for which pt was started on D5 IVF. Recc to check BMP q 4hrs; goal serum Na 120-123. TSH wnl. Recc to repeat serum osm, urine osm and urine Na in am. Monitor serum Na    2. Hypokalemia- pt give KCL PO repletion. Repleting potassium will also increase serum Na. Monitor lytes.     3. Hypophosphatemia- pt given Kphos IV x1. Monitor serum phos    4. HTN- BP acceptable. c/w Losartan. Recc to hold HCTZ due to hyponatremia. Monitor BP

## 2020-07-17 NOTE — PROGRESS NOTE ADULT - ASSESSMENT
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 s/p 500 ml IV fluids in ED. Urine lytes shows dilute urine. serum osmolality sent. Will repeat BMP. Nephrology consult dr willard.    Assessment:  1. 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  2. Rule out intra-abdominal pathology for vomiting.  3. Hyperlipidemia  4. Hypertension.      Plan:    CNS:  No active issues at baseline.  Patient is alert oriented x 3 on my assessment.  Patient only felt mildly dizzy with vomiting earlier, currently asymptomatic.  Monitor for any seizures, convulsion, or change in mental status.      CVS;  #History of hypertension.  takes losartan-hctz and amlodipine at home.  blood pressure is on higher side.  will resume amlodipine.  will hold hydrochlorothiazide.  monitor blood pressure.  #History of hyperlipidemia.  continue simvastatin  f/u lipid panel.  EKG showed normal sinus rhthym , no ischemic changes.  Patient is on Eliquis for 'heart problem' per family.  will resume.      Respiratory:  No active issues.  patient is saturating 96 % on room air.      GI:  will keep NPO.  Will advance diet once serum sodium improves.      Nephrology:  Patient is being admitted with hyponatremia with serum sodium of 114 mg/dl likely secondary  SIADH due to vomiting vs medication induced (HCTZ can cause increased urine sodium).   Urine lytes shows dilute urine.   serum osmolality sent.   Will repeat BMP now .   BMP q 4 hourly.  Nephrology consult dr willard.  hypokalemia and hypophosphoremia  replaced.  will hold HCTZ      Heam/onc:  patient has anemia.  no active signs of bleeding and no baseline to compare.  will monitor CBC.      ID:  No active issues.  will do ct scan of abdomen to rule out intra-abdominal pathology for persistent vomiting.      DVT prophylaxis:  Heparin sc for DVT prophylaxis.  Iv protonix while NPO.    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 s/p 500 ml IV fluids in ED. Urine lytes shows dilute urine. serum osmolality sent. Will repeat BMP. Nephrology consult dr willard.    Assessment:  1. 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  2. Rule out intra-abdominal pathology for vomiting.  3. Hyperlipidemia  4. Hypertension.      Plan:    CNS:  No active issues at baseline.  Patient is alert oriented x 3 on my assessment.  Patient only felt mildly dizzy with vomiting earlier, currently asymptomatic.  Monitor for any seizures, convulsion, or change in mental status.      CVS;  #History of hypertension.  takes losartan 50mg-hctz 12.5mg and amlodipine 2.5mg at home.  c/w amlodipine 5mg daily  will hold hydrochlorothiazide.  monitor blood pressure.    #History of hyperlipidemia.  continue simvastatin  lipid panel WNL    # Possible hx of Afib  EKG showed normal sinus rhthym , no ischemic changes.  Patient is on Eliquis for 'heart problem' per family  c/w sotalol and Eliquis    Respiratory:  No active issues.  patient is saturating 100 % on room air.      GI:  - starting diet with fluid restriction    Nephrology:  # Hyponatremia  - p/w serum sodium of 114 mg/dl likely secondary  SIADH due to vomiting vs medication induced (HCTZ can cause increased urine sodium).   Urine lytes shows dilute urine.   serum osmolality sent.   Will repeat BMP now .   BMP q 4 hourly.  Nephrology consult dr willard.  hypokalemia and hypophosphoremia  replaced.  will hold HCTZ      Heam/onc:  patient has anemia.  no active signs of bleeding and no baseline to compare.  will monitor CBC.      ID:  No active issues.  will do ct scan of abdomen to rule out intra-abdominal pathology for persistent vomiting.      DVT prophylaxis:  Heparin sc for DVT prophylaxis.  Iv protonix while NPO.    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 s/p 500 ml IV fluids in ED. Urine lytes shows dilute urine. serum osmolality sent. Will repeat BMP. Nephrology consult dr willard.    Assessment:  1. 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  2. Rule out intra-abdominal pathology for vomiting.  3. Hyperlipidemia  4. Hypertension.      Plan:    CNS:  pt has "memory problem" as per daughter  Patient is alert oriented x 3 now  pt had n/v, headache but resolved now  Monitor for any seizures, convulsion, or change in mental status.      CVS;  #Hypertension.  takes losartan 50mg-hctz 12.5mg and amlodipine 2.5mg at home.  c/w amlodipine 5mg daily  will hold hydrochlorothiazide.  monitor blood pressure.    #History of hyperlipidemia.  continue simvastatin  lipid panel WNL    # Possible hx of Afib  EKG showed normal sinus rhthym , no ischemic changes.  Patient is on Eliquis for 'heart problem' per family  c/w sotalol and Eliquis    Respiratory:  No active issues.  patient is saturating 100 % on room air.      GI:  - starting regular diet with fluid restriction    Nephrology:  # Hyponatremia 2/2 renal loss from HCTZ vs SIADH  - p/w serum sodium of 114, likely secondary  SIADH due to vomiting vs medication induced (HCTZ can cause increased urine sodium).  - Uosm 317, Kinga 77, blood osm 239 (low).   - pt was given NS 60cc/h and 2g Nacl tab  - Na 115 (7/16 7pm) ->114 (9pm) ->124 (7/17 4am) ->127 (8am)  - correcting too fast, c/w D5W 125cc/h  - BMP q 4 hourly.  Nephrology consult Dr willard.    #hypokalemia and hypophosphoremia  replaced.  will hold HCTZ      Heam/onc:  No issues  no active signs of bleeding and no baseline to compare.  will monitor CBC.    ID:  No active issues.  CT scan of abdomen : no acute infection or pathology, showed liver cysts, diverticulosis, severe joint arthrosis L5-S1    DVT prophylaxis:  Heparin sc for DVT prophylaxis.  Dcd Iv protonix as pt was started on diet    Dispo:  monitor in ICU

## 2020-07-17 NOTE — CONSULT NOTE ADULT - SUBJECTIVE AND OBJECTIVE BOX
[facility] NEPHROLOGY- CONSULTATION NOTE     # [id number]    Patient is a 80yo Bermudian speaking Female with HTN on Losartan/ HCTZ, HLD p/w n/v x 2 days. Pt found to have serum Na 115. Nephrology consulted for hyponatremia    Patient ate 'simbosa' from outside 2 days ago after that she has been vomiting and not able to hold up anything. Pt state she had several episodes of n/v x 2 days. She had decreased PO intake . Denies any dizziness, SOB, chest pain, diarrhea, urinary complaints or LE edema. Pt found to have Na 115. Pt was given 500cc NS bolus in the ER and serum Na decreased to 114. I was called by ICU and advised to c/w IVF and give NaCl 2g PO x1. Now with overcorrection of serum Na and now on hypotonic fluids. Pt denies any complaints and wants to eat now.       PAST MEDICAL & SURGICAL HISTORY:  HTN (hypertension)  No significant past surgical history    No Known Allergies    Home Medications Reviewed  Hospital Medications:   MEDICATIONS  (STANDING):  amLODIPine   Tablet 5 milliGRAM(s) Oral daily  apixaban 5 milliGRAM(s) Oral every 12 hours  chlorhexidine 2% Cloths 1 Application(s) Topical <User Schedule>  dextrose 5%. 1000 milliLiter(s) (150 mL/Hr) IV Continuous <Continuous>  latanoprost 0.005% Ophthalmic Solution 1 Drop(s) Both EYES at bedtime  levothyroxine 50 MICROGram(s) Oral daily  sotalol 80 milliGRAM(s) Oral two times a day  timolol 0.5% Solution 1 Drop(s) Both EYES two times a day    FAMILY HISTORY:      REVIEW OF SYSTEMS:  Gen: no changes in weight  HEENT: no rhinorrhea  Neck: no sore throat  Cards: no chest pain  Resp: no dyspnea  GI: no nausea or vomiting - resolved. Denies diarrhea  : no dysuria or hematuria  Vascular: no LE edema  Derm: no rashes  Neuro: no numbness/tingling  All other review of systems is negative unless indicated above.    VITALS:  T(F): 96.8 (20 @ 08:00), Max: 99.1 (20 @ 17:47)  HR: 66 (20 @ 12:00)  BP: 99/80 (20 @ 11:00)  RR: 15 (20 @ 12:00)  SpO2: 100% (20 @ 12:00)  Wt(kg): --     @ 07:01  -   @ 07:00  --------------------------------------------------------  IN: 900 mL / OUT: 750 mL / NET: 150 mL     @ 07:01  -   @ 13:49  --------------------------------------------------------  IN: 900 mL / OUT: 300 mL / NET: 600 mL      Height (cm): 154.94 ( 17:47)  Weight (kg): 72.7 ( @ 23:30)  BMI (kg/m2): 30.3 ( 23:30)  BSA (m2): 1.72 ( 23:30)    PHYSICAL EXAM:  Gen: NAD, calm  HEENT: anicteric   Neck: no JVD  Cards: RRR, +S1/S2, no M/G/R  Resp: CTA B/L  GI: soft, NT/ND, NABS  : no CVA tenderness  Extremities: no LE edema B/L  Derm: no rashes  Neuro: non-focal    LABS:    127 <--, 124 <--, 114 <--, 115 <--  127<L>  |  94<L>  |  4<L>  ----------------------------<  106<H>  3.6   |  28  |  0.64    Ca    8.3<L>      2020 08:44  Phos  2.1       Mg     2.3         TPro  7.4  /  Alb  3.7  /  TBili  0.8  /  DBili      /  AST  33  /  ALT  23  /  AlkPhos  33<L>      Creatinine Trend: 0.64 <--, 0.70 <--, 0.68 <--, 0.70 <--                        11.7   6.46  )-----------( 264      ( 2020 03:46 )             32.8     Urine Studies:  Urinalysis Basic - ( 2020 20:15 )    Color: Yellow / Appearance: Clear / S.005 / pH:   Gluc:  / Ketone: Moderate  / Bili: Negative / Urobili: Negative   Blood:  / Protein: Negative / Nitrite: Negative   Leuk Esterase: Negative / RBC: 0-2 /HPF / WBC 0-2 /HPF   Sq Epi:  / Non Sq Epi: Few /HPF / Bacteria: Trace /HPF      Creatinine, Random Urine: <13 mg/dL ( @ 08:45)  Sodium, Random Urine: 26 mmol/L ( @ 08:45)  Osmolality, Random Urine: 102 mos/kg ( @ 08:44)  Sodium, Random Urine: 77 mmol/L ( @ 20:15)  Osmolality, Random Urine: 317 mos/kg ( @ 20:15)  Creatinine, Random Urine: 32 mg/dL ( @ 20:15)    RADIOLOGY & ADDITIONAL STUDIES:      < from: CT Abdomen and Pelvis w/ IV Cont (20 @ 23:18) >    EXAM:  CT ABDOMEN AND PELVIS IC                            PROCEDURE DATE:  2020          INTERPRETATION:  CLINICAL INFORMATION: Nausea, vomiting.      < end of copied text >  < from: CT Abdomen and Pelvis w/ IV Cont (20 @ 23:18) >  IMPRESSION: No acute intra-abdominal abnormality to elucidate the presenting symptoms.    < end of copied text >

## 2020-07-17 NOTE — DIETITIAN INITIAL EVALUATION ADULT. - PERTINENT MEDS FT
MEDICATIONS  (STANDING):  apixaban 5 milliGRAM(s) Oral every 12 hours  chlorhexidine 2% Cloths 1 Application(s) Topical <User Schedule>  dextrose 5%. 1000 milliLiter(s) (150 mL/Hr) IV Continuous <Continuous>  latanoprost 0.005% Ophthalmic Solution 1 Drop(s) Both EYES at bedtime  levothyroxine 50 MICROGram(s) Oral daily  potassium phosphate IVPB 15 milliMole(s) IV Intermittent once  sotalol 80 milliGRAM(s) Oral two times a day  timolol 0.5% Solution 1 Drop(s) Both EYES two times a day    MEDICATIONS  (PRN):  ondansetron Injectable 4 milliGRAM(s) IV Push three times a day PRN Nausea and/or Vomiting

## 2020-07-17 NOTE — PROGRESS NOTE ADULT - SUBJECTIVE AND OBJECTIVE BOX
History of Present Illness:  Reason for Admission: Nausea and vomiting x 2 days	  History of Present Illness: 	  79 years old Marshallese speaking female from home with PMHx of HTN (on Losartan), HLP ( Simvastatin), presented to ED with severe nausea and vomiting x 2 days. Patient ate 'simbosa' from outside 2 days ago after that she has been vomiting and not able to hold up anything. Patient admits to having decreased PO intake because of vomiting. Patient denies having history of low serum sodium level or any recent change in medications. Patient does have some dizziness because of vomiting.  Patient denies any chest pain, shortness of breath, abdominal pain, change in urinary or bowel habits.  Patient is being admitted with hyponatremia with serum sodium of 114 mg/dl likely secondary to SIADH due to vomiting vs medication induced (HCTZ can cause increased urine sodium). s/p 500 ml IV fluids in ED. Urine lytes shows dilute urine. serum osmolality sent. Will repeat BMP. Nephrology consult dr willard. will hold hydrochlorothiazide     Review of Systems:  Other Review of Systems: All other review of systems negative, except as noted in HPI	      Allergies and Intolerances:        Allergies:  	No Known Allergies:     Home Medications:   * Outpatient Medication Status not yet specified    Patient History:    Past Medical, Past Surgical, and Family History:  PAST MEDICAL HISTORY:  HTN (hypertension).     PAST SURGICAL HISTORY:  No significant past surgical history.     Social History:  Social History (marital status, living situation, occupation, tobacco use, alcohol and drug use, and sexual history): denies smoking, alcohol or illicit drug use	     Tobacco Screening:  · Core Measure Site	Yes	  · Has the patient used tobacco in the past 30 days?	No	    Risk Assessment:    Present on Admission:  Deep Venous Thrombosis	no	  Pulmonary Embolus	no	     Heart Failure:  Does this patient have a history of or has been diagnosed with heart failure? no.    Physical Exam:   Physical Exam: Vital Signs Last 24 Hrs  T(C): 37.3 (16 Jul 2020 17:47), Max: 37.3 (16 Jul 2020 17:47)  T(F): 99.1 (16 Jul 2020 17:47), Max: 99.1 (16 Jul 2020 17:47)  HR: 71 (16 Jul 2020 17:47) (71 - 71)  BP: 166/77 (16 Jul 2020 17:47) (166/77 - 166/77)  BP(mean): --  RR: 20 (16 Jul 2020 17:47) (20 - 20)  SpO2: 99% (16 Jul 2020 17:47) (99% - 99%)  PHYSICAL EXAM:  GENERAL: NAD, speaks in full sentences, no signs of respiratory distress  HEAD:  Atraumatic, Normocephalic  EYES: EOMI, PERRLA, conjunctiva and sclera clear  NECK: Supple, No JVD  CHEST/LUNG: Clear to auscultation bilaterally; No wheeze; No crackles; No accessory muscles used  HEART: Regular rate and rhythm; No murmurs;   ABDOMEN: Soft, Nontender, Nondistended; Bowel sounds present; No guarding  EXTREMITIES:  2+ Peripheral Pulses, edema +1  PSYCH: AAOx3  NEUROLOGY: non-focal SKIN: No rashes or lesions	       Labs and Results:  Labs, Radiology, Cardiology, and Other Results: EKG: showed normal sinus rhythm. Some baseline motion artifact noted. CXR : shows cardiomegaly. but lungs are clear with no infiultrates	    Laboratory:   General Chemistry:	    16-Jul-2020 19:02, Comprehensive Metabolic Panel	  Sodium, Serum:    115, [135 - 145 mmol/L], TYPE:(C=Critical, N=Notification, A=Abnormal) _  TESTS: NA_  DATE/TIME CALLED: _07/16/20 19:44  CALLED TO: _  READ BACK (2 Patient Identifiers)(Y/N): Y_  READ BACK VALUES (Y/N): _Y  CALLED BY: KIRK07/16/20 19:44	  Potassium, Serum:    3.1, [3.5 - 5.3 mmol/L]	  Chloride, Serum:    80, [96 - 108 mmol/L]	  Carbon Dioxide, Serum: 25, [22 - 31 mmol/L]	  Anion Gap, Serum: 10, [5 - 17 mmol/L]	  Blood Urea Nitrogen, Serum: 7, [7 - 18 mg/dL]	  Creatinine, Serum: 0.70, [0.50 - 1.30 mg/dL]	  Glucose, Serum:    120, [70 - 99 mg/dL]	  Calcium, Total Serum: 8.5, [8.4 - 10.5 mg/dL]	  Protein Total, Serum: 7.4, [6.0 - 8.3 g/dL]	  Albumin, Serum: 3.7, [3.5 - 5.0 g/dL]	  Bilirubin Total, Serum: 0.8, [0.2 - 1.2 mg/dL]	  Alkaline Phosphatase, Serum:    33, [40 - 120 U/L]	  Aspartate Aminotransferase (AST/SGOT): 33, [10 - 40 U/L]	  Alanine Aminotransferase (ALT/SGPT): 23, [10 - 60 U/L DA]	  eGFR if Non African American: 81, [>=60 mL/min/1.73M2], Interpretative comment  The units for eGFR are mL/min/1.73M2 (normalized body surface area). The  eGFR is calculated from a serum creatinine using the CKD-EPI equation.  Other variables required for calculation are race, age and sex. Among  patients with chronic kidney disease (CKD), the eGFR is useful in  determining the stage of disease according to KDOQI CKD classification.  All eGFR results are reported numerically with the following  interpretation.          GFR                    With                 Without     (ml/min/1.73 m2)    Kidney Damage       Kidney Damage        >= 90                    Stage 1                     Normal        60-89                    Stage 2                     Decreased GFR        30-59     Stage 3                     Stage 3        15-29                    Stage 4                     Stage 4        < 15                      Stage 5                     Stage 5  Each stage of CKD assumes that the associated GFR level has been in  effect for at least 3 months. Determination of stages one and two (with  eGFR > 59 ml/min/m2) requires estimation of kidney damage for at least 3  months as defined by structural or functional abnormalities.  Limitations: All estimates of GFR will be less accurate for patients at  extremes of muscle mass (including but not limited to frail elderly,  critically ill, or cancer patients), those with unusual diets, and those  with conditions associated with reduced secretion or extrarenal  elimination of creatinine. The eGFR equation is not recommended for use  in patients with unstable creatinine levels.	  eGFR if : 94, [>=60 mL/min/1.73M2]	    16-Jul-2020 19:02, Lipase, Serum	  Lipase, Serum: 315, [73 - 393 U/L]	    16-Jul-2020 19:13, Troponin I, Serum	  Troponin I, Serum: <0.015, [0.000 - 0.045 ng/mL], The new reference range for Troponin-I performed on the Siemens Vista  system is 0.015-0.045 ng/mL, which includes the 99th percentile of a  healthy reference population. Studies have shown that elevated troponin  levels above the 99th percentile cutoff are associated with an increased  risk for adverse cardiac events, with the risk increasing as troponin  levels increase. As per a joint committee of the American College of  Cardiology and European Society of Cardiology, diagnosis of classic MI is  based upon the detection of a rise or fall of cardiac troponin values,  with at least one value above the 99th percentile upper reference limit,  in the appropriate clinical context.  Troponin-I (ng/mL) Interpretation  0.00-0.045 Normal range (includes the 99th percentile of a healthy  reference population)  >0.045 Elevated troponin level indicating increased risk  Note: Troponin-I and Troponin-T cannot be used interchangeably in serial  measurements. Minimally elevated Troponin results should be interpreted  in the context of clinical findings and risk factors.	    16-Jul-2020 20:18, Magnesium, Serum	  Magnesium, Serum: 1.9, [1.6 - 2.6 mg/dL]	    16-Jul-2020 20:18, Phosphorus Level, Serum	  Phosphorus Level, Serum:    1.6, [2.5 - 4.5 mg/dL]	  Hematology:	    16-Jul-2020 19:02, Complete Blood Count + Automated Diff	  WBC Count: 6.16, [3.80 - 10.50 K/uL]	  RBC Count:    3.79, [3.80 - 5.20 M/uL]	  Hemoglobin:    10.8, [11.5 - 15.5 g/dL]	  Hematocrit:    30.9, [34.5 - 45.0 %]	  Mean Cell Volume: 81.5, [80.0 - 100.0 fl]	  Mean Cell Hemoglobin: 28.5, [27.0 - 34.0 pg]	  Mean Cell Hemoglobin Conc: 35.0, [32.0 - 36.0 gm/dL]	  Red Cell Distrib Width: 12.1, [10.3 - 14.5 %]	  Platelet Count - Automated: 266, [150 - 400 K/uL]	  Auto Neutrophil #: 4.32, [1.80 - 7.40 K/uL]	  Auto Lymphocyte #: 1.24, [1.00 - 3.30 K/uL]	  Auto Monocyte #: 0.54, [0.00 - 0.90 K/uL]	  Auto Eosinophil #: 0.02, [0.00 - 0.50 K/uL]	  Auto Basophil #: 0.01, [0.00 - 0.20 K/uL]	  Auto Neutrophil %: 70.1, [43.0 - 77.0 %], Differential percentages must be correlated with absolute numbers for  clinical significance.	  Auto Lymphocyte %: 20.1, [13.0 - 44.0 %]	  Auto Monocyte %: 8.8, [2.0 - 14.0 %]	  Auto Eosinophil %: 0.3, [0.0 - 6.0 %]	  Auto Basophil %: 0.2, [0.0 - 2.0 %]	  Auto Immature Granulocyte %: 0.5, [0.0 - 1.5 %]	  Nucleated RBC: 0, [0 - 0 /100 WBCs]	  Urine:	    16-Jul-2020 20:15, Creatinine, Random Urine	  Creatinine, Random Urine: 32, [ mg/dL], Reference Ranges have NOT been established for random urine analytes due  to variability in fluid intake and concentration.	    16-Jul-2020 20:15, Osmolality, Random Urine	  Osmolality, Random Urine: 317, [50 - 1200 mos/kg]	    16-Jul-2020 20:15, Sodium, Random Urine	  Sodium, Random Urine: 77, [ mmol/L], Reference Ranges have NOT been established for random urine analytes due  to variability in fluid intake and concentration.	    16-Jul-2020 20:15, Urinalysis + Microscopic Examination	  Red Blood Cell - Urine: 0-2, [0 - 2 /HPF]	  White Blood Cell - Urine: 0-2, [0 - 5 /HPF]	  Epithelial Cells: Few, [Negative /HPF]	  Bacteria:    Trace, [Negative /HPF]	  Nitrite: Negative, [Negative]	  Leukocyte Esterase Concentration: Negative, [Negative]	  Urobilinogen: Negative, [Negative]	  Bilirubin: Negative, [Negative]	  Blood, Urine:    Trace, [Negative]	  Ketone - Urine:    Moderate, [Negative]	  Glucose Qualitative, Urine: Negative, [Negative]	  Protein, Urine: Negative, [Negative]	  Specific Gravity:    1.005, [1.010 - 1.025]	  Color: Yellow, [Yellow]	  Urine Appearance: Clear, [Clear]	  pH Urine: 7.0, [5.0 - 8.0]	    Assessment and Plan:    Assessment:  · Assessment		  Patient is being admitted to ICU with hyponatremia with serum sodium of 114 mg/dl likely secondary to SIADH due to vomiting. s/p 500 ml IV fluids in ED. Urine lytes shows dilute urine. serum osmolality sent. Will repeat BMP. Nephrology consult dr willard.    Assessment:  1. 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  2. Rule out intra-abdominal pathology for vomiting.  3. Hyperlipidemia  4. Hypertension.      Plan:    CNS:  No active issues at baseline.  Patient is alert oriented x 3 on my assessment.  Patient only felt mildly dizzy with vomiting earlier, currently asymptomatic.  Monitor for any seizures, convulsion, or change in mental status.      CVS;  #History of hypertension.  takes losartan-hctz and amlodipine at home.  blood pressure is on higher side.  will resume amlodipine.  will hold hydrochlorothiazide.  monitor blood pressure.  #History of hyperlipidemia.  continue simvastatin  f/u lipid panel.  EKG showed normal sinus rhthym , no ischemic changes.  Patient is on Eliquis for 'heart problem' per family.  will resume.      Respiratory:  No active issues.  patient is saturating 96 % on room air.      GI:  will keep NPO.  Will advance diet once serum sodium improves.      Nephrology:  Patient is being admitted with hyponatremia with serum sodium of 114 mg/dl likely secondary  SIADH due to vomiting vs medication induced (HCTZ can cause increased urine sodium).   Urine lytes shows dilute urine.   serum osmolality sent.   Will repeat BMP now .   BMP q 4 hourly.  Nephrology consult dr willard.  hypokalemia and hypophosphoremia  replaced.  will hold HCTZ      Heam/onc:  patient has anemia.  no active signs of bleeding and no baseline to compare.  will monitor CBC.      ID:  No active issues.  will do ct scan of abdomen to rule out intra-abdominal pathology for persistent vomiting.      DVT prophylaxis:  Heparin sc for DVT prophylaxis.  Iv protonix while NPO.    Dispo:  ICU

## 2020-07-17 NOTE — CONSULT NOTE ADULT - ATTENDING COMMENTS
[facility] NEPHROLOGY  Manuel Clay M.D.  Mark Slaughter D.O.  Marj Benoit M.D.  Shamika Lopez, MSN, ANP-C  [phone]    [address] [place] [address]

## 2020-07-18 LAB
ALBUMIN SERPL ELPH-MCNC: 3.1 G/DL — LOW (ref 3.5–5)
ALP SERPL-CCNC: 30 U/L — LOW (ref 40–120)
ALT FLD-CCNC: 37 U/L DA — SIGNIFICANT CHANGE UP (ref 10–60)
ANION GAP SERPL CALC-SCNC: 6 MMOL/L — SIGNIFICANT CHANGE UP (ref 5–17)
ANION GAP SERPL CALC-SCNC: 7 MMOL/L — SIGNIFICANT CHANGE UP (ref 5–17)
ANION GAP SERPL CALC-SCNC: 8 MMOL/L — SIGNIFICANT CHANGE UP (ref 5–17)
ANION GAP SERPL CALC-SCNC: 8 MMOL/L — SIGNIFICANT CHANGE UP (ref 5–17)
AST SERPL-CCNC: 43 U/L — HIGH (ref 10–40)
BASOPHILS # BLD AUTO: 0.03 K/UL — SIGNIFICANT CHANGE UP (ref 0–0.2)
BASOPHILS NFR BLD AUTO: 0.5 % — SIGNIFICANT CHANGE UP (ref 0–2)
BILIRUB SERPL-MCNC: 0.3 MG/DL — SIGNIFICANT CHANGE UP (ref 0.2–1.2)
BUN SERPL-MCNC: 10 MG/DL — SIGNIFICANT CHANGE UP (ref 7–18)
BUN SERPL-MCNC: 13 MG/DL — SIGNIFICANT CHANGE UP (ref 7–18)
BUN SERPL-MCNC: 14 MG/DL — SIGNIFICANT CHANGE UP (ref 7–18)
BUN SERPL-MCNC: 15 MG/DL — SIGNIFICANT CHANGE UP (ref 7–18)
CALCIUM SERPL-MCNC: 6.2 MG/DL — CRITICAL LOW (ref 8.4–10.5)
CALCIUM SERPL-MCNC: 8.2 MG/DL — LOW (ref 8.4–10.5)
CALCIUM SERPL-MCNC: 8.3 MG/DL — LOW (ref 8.4–10.5)
CALCIUM SERPL-MCNC: 8.4 MG/DL — SIGNIFICANT CHANGE UP (ref 8.4–10.5)
CHLORIDE SERPL-SCNC: 102 MMOL/L — SIGNIFICANT CHANGE UP (ref 96–108)
CHLORIDE SERPL-SCNC: 92 MMOL/L — LOW (ref 96–108)
CHLORIDE SERPL-SCNC: 93 MMOL/L — LOW (ref 96–108)
CHLORIDE SERPL-SCNC: 97 MMOL/L — SIGNIFICANT CHANGE UP (ref 96–108)
CO2 SERPL-SCNC: 21 MMOL/L — LOW (ref 22–31)
CO2 SERPL-SCNC: 25 MMOL/L — SIGNIFICANT CHANGE UP (ref 22–31)
CO2 SERPL-SCNC: 25 MMOL/L — SIGNIFICANT CHANGE UP (ref 22–31)
CO2 SERPL-SCNC: 26 MMOL/L — SIGNIFICANT CHANGE UP (ref 22–31)
CREAT SERPL-MCNC: 0.42 MG/DL — LOW (ref 0.5–1.3)
CREAT SERPL-MCNC: 0.65 MG/DL — SIGNIFICANT CHANGE UP (ref 0.5–1.3)
CREAT SERPL-MCNC: 0.73 MG/DL — SIGNIFICANT CHANGE UP (ref 0.5–1.3)
CREAT SERPL-MCNC: 0.77 MG/DL — SIGNIFICANT CHANGE UP (ref 0.5–1.3)
CULTURE RESULTS: SIGNIFICANT CHANGE UP
EOSINOPHIL # BLD AUTO: 0.13 K/UL — SIGNIFICANT CHANGE UP (ref 0–0.5)
EOSINOPHIL NFR BLD AUTO: 2 % — SIGNIFICANT CHANGE UP (ref 0–6)
GLUCOSE SERPL-MCNC: 106 MG/DL — HIGH (ref 70–99)
GLUCOSE SERPL-MCNC: 134 MG/DL — HIGH (ref 70–99)
GLUCOSE SERPL-MCNC: 93 MG/DL — SIGNIFICANT CHANGE UP (ref 70–99)
GLUCOSE SERPL-MCNC: 95 MG/DL — SIGNIFICANT CHANGE UP (ref 70–99)
HCT VFR BLD CALC: 31.8 % — LOW (ref 34.5–45)
HGB BLD-MCNC: 11 G/DL — LOW (ref 11.5–15.5)
IMM GRANULOCYTES NFR BLD AUTO: 0.6 % — SIGNIFICANT CHANGE UP (ref 0–1.5)
LYMPHOCYTES # BLD AUTO: 2.25 K/UL — SIGNIFICANT CHANGE UP (ref 1–3.3)
LYMPHOCYTES # BLD AUTO: 33.8 % — SIGNIFICANT CHANGE UP (ref 13–44)
MAGNESIUM SERPL-MCNC: 2.3 MG/DL — SIGNIFICANT CHANGE UP (ref 1.6–2.6)
MCHC RBC-ENTMCNC: 28.9 PG — SIGNIFICANT CHANGE UP (ref 27–34)
MCHC RBC-ENTMCNC: 34.6 GM/DL — SIGNIFICANT CHANGE UP (ref 32–36)
MCV RBC AUTO: 83.5 FL — SIGNIFICANT CHANGE UP (ref 80–100)
MONOCYTES # BLD AUTO: 0.92 K/UL — HIGH (ref 0–0.9)
MONOCYTES NFR BLD AUTO: 13.8 % — SIGNIFICANT CHANGE UP (ref 2–14)
NEUTROPHILS # BLD AUTO: 3.28 K/UL — SIGNIFICANT CHANGE UP (ref 1.8–7.4)
NEUTROPHILS NFR BLD AUTO: 49.3 % — SIGNIFICANT CHANGE UP (ref 43–77)
NRBC # BLD: 0 /100 WBCS — SIGNIFICANT CHANGE UP (ref 0–0)
PHOSPHATE SERPL-MCNC: 2 MG/DL — LOW (ref 2.5–4.5)
PLATELET # BLD AUTO: 237 K/UL — SIGNIFICANT CHANGE UP (ref 150–400)
POTASSIUM SERPL-MCNC: 3.5 MMOL/L — SIGNIFICANT CHANGE UP (ref 3.5–5.3)
POTASSIUM SERPL-MCNC: 3.9 MMOL/L — SIGNIFICANT CHANGE UP (ref 3.5–5.3)
POTASSIUM SERPL-MCNC: 4.4 MMOL/L — SIGNIFICANT CHANGE UP (ref 3.5–5.3)
POTASSIUM SERPL-MCNC: 4.6 MMOL/L — SIGNIFICANT CHANGE UP (ref 3.5–5.3)
POTASSIUM SERPL-SCNC: 3.5 MMOL/L — SIGNIFICANT CHANGE UP (ref 3.5–5.3)
POTASSIUM SERPL-SCNC: 3.9 MMOL/L — SIGNIFICANT CHANGE UP (ref 3.5–5.3)
POTASSIUM SERPL-SCNC: 4.4 MMOL/L — SIGNIFICANT CHANGE UP (ref 3.5–5.3)
POTASSIUM SERPL-SCNC: 4.6 MMOL/L — SIGNIFICANT CHANGE UP (ref 3.5–5.3)
PROT SERPL-MCNC: 6.5 G/DL — SIGNIFICANT CHANGE UP (ref 6–8.3)
RBC # BLD: 3.81 M/UL — SIGNIFICANT CHANGE UP (ref 3.8–5.2)
RBC # FLD: 13.3 % — SIGNIFICANT CHANGE UP (ref 10.3–14.5)
SARS-COV-2 IGG SERPL QL IA: NEGATIVE — SIGNIFICANT CHANGE UP
SARS-COV-2 IGM SERPL IA-ACNC: <0.1 INDEX — SIGNIFICANT CHANGE UP
SODIUM SERPL-SCNC: 125 MMOL/L — LOW (ref 135–145)
SODIUM SERPL-SCNC: 125 MMOL/L — LOW (ref 135–145)
SODIUM SERPL-SCNC: 129 MMOL/L — LOW (ref 135–145)
SODIUM SERPL-SCNC: 131 MMOL/L — LOW (ref 135–145)
SPECIMEN SOURCE: SIGNIFICANT CHANGE UP
TSH SERPL-MCNC: 2.21 UU/ML — SIGNIFICANT CHANGE UP (ref 0.34–4.82)
WBC # BLD: 6.65 K/UL — SIGNIFICANT CHANGE UP (ref 3.8–10.5)
WBC # FLD AUTO: 6.65 K/UL — SIGNIFICANT CHANGE UP (ref 3.8–10.5)

## 2020-07-18 RX ORDER — SODIUM CHLORIDE 9 MG/ML
1000 INJECTION INTRAMUSCULAR; INTRAVENOUS; SUBCUTANEOUS
Refills: 0 | Status: DISCONTINUED | OUTPATIENT
Start: 2020-07-18 | End: 2020-07-19

## 2020-07-18 RX ORDER — SODIUM CHLORIDE 9 MG/ML
250 INJECTION INTRAMUSCULAR; INTRAVENOUS; SUBCUTANEOUS ONCE
Refills: 0 | Status: DISCONTINUED | OUTPATIENT
Start: 2020-07-18 | End: 2020-07-18

## 2020-07-18 RX ORDER — SODIUM,POTASSIUM PHOSPHATES 278-250MG
1 POWDER IN PACKET (EA) ORAL ONCE
Refills: 0 | Status: COMPLETED | OUTPATIENT
Start: 2020-07-18 | End: 2020-07-18

## 2020-07-18 RX ADMIN — SODIUM CHLORIDE 150 MILLILITER(S): 9 INJECTION, SOLUTION INTRAVENOUS at 07:11

## 2020-07-18 RX ADMIN — APIXABAN 5 MILLIGRAM(S): 2.5 TABLET, FILM COATED ORAL at 06:04

## 2020-07-18 RX ADMIN — Medication 1 DROP(S): at 17:07

## 2020-07-18 RX ADMIN — Medication 80 MILLIGRAM(S): at 06:04

## 2020-07-18 RX ADMIN — SODIUM CHLORIDE 70 MILLILITER(S): 9 INJECTION INTRAMUSCULAR; INTRAVENOUS; SUBCUTANEOUS at 18:33

## 2020-07-18 RX ADMIN — Medication 1 PACKET(S): at 08:54

## 2020-07-18 RX ADMIN — APIXABAN 5 MILLIGRAM(S): 2.5 TABLET, FILM COATED ORAL at 17:06

## 2020-07-18 RX ADMIN — Medication 1 DROP(S): at 06:05

## 2020-07-18 RX ADMIN — CHLORHEXIDINE GLUCONATE 1 APPLICATION(S): 213 SOLUTION TOPICAL at 06:06

## 2020-07-18 RX ADMIN — Medication 50 MICROGRAM(S): at 06:04

## 2020-07-18 RX ADMIN — LATANOPROST 1 DROP(S): 0.05 SOLUTION/ DROPS OPHTHALMIC; TOPICAL at 21:12

## 2020-07-18 NOTE — PROGRESS NOTE ADULT - ASSESSMENT
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 s/p 500 ml IV fluids in ED. Urine lytes shows dilute urine. serum osmolality sent. Will repeat BMP. Nephrology consult dr willard.    Assessment:  1. 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  2. Rule out intra-abdominal pathology for vomiting.  3. Hyperlipidemia  4. Hypertension.      Plan:    CNS:  pt has "memory problem" as per daughter  Patient is alert oriented x 3 now  pt had n/v, headache but resolved now  Monitor for any seizures, convulsion, or change in mental status.      CVS;  #Hypertension/hypotension  takes losartan 50mg-hctz 12.5mg and amlodipine 2.5mg at home.  Started on phenylephrine for hypotension  will hold amlodipine and hydrochlorothiazide.  monitor blood pressure.    #History of hyperlipidemia.  continue simvastatin  lipid panel WNL    # Possible hx of Afib  EKG showed normal sinus rhthym , no ischemic changes.  Patient is on Eliquis for 'heart problem' per family  c/w sotalol and Eliquis    Respiratory:  No active issues.  patient is saturating 100 % on room air.      GI:  - starting regular diet with fluid restriction    Nephrology:  # Hyponatremia 2/2 renal loss from HCTZ vs SIADH  - p/w serum sodium of 114, likely secondary  SIADH due to vomiting vs medication induced (HCTZ can cause increased urine sodium).  - Uosm 317, Kinga 77, blood osm 239 (low).   - pt was given NS 60cc/h and 2g Nacl tab  - Na 115 (7/16 7pm) ->114 (9pm) ->124 (7/17 4am) ->127 (8am)-> 132 ->132  - correcting too fast, c/w D5W 150cc/h  - BMP q 4 hourly.  Nephrology consult Dr willard.    #hypokalemia and hypophosphoremia  replaced.  will hold HCTZ      Heam/onc:  No issues  no active signs of bleeding and no baseline to compare.  will monitor CBC.    ID:  No active issues.  CT scan of abdomen : no acute infection or pathology, showed liver cysts, diverticulosis, severe joint arthrosis L5-S1    DVT prophylaxis:  Heparin sc for DVT prophylaxis.  Dcd Iv protonix as pt was started on diet    Dispo:  monitor in ICU

## 2020-07-18 NOTE — PROGRESS NOTE ADULT - SUBJECTIVE AND OBJECTIVE BOX
[facility] NEPHROLOGY- PROGRESS NOTE    Patient is a 78yo Marshallese speaking Female with HTN on Losartan/ HCTZ, HLD p/w n/v x 2 days. Pt found to have serum Na 115. Nephrology consulted for hyponatremia    Hospital Medications: Medications reviewed.  REVIEW OF SYSTEMS: Grand-daughter at bedside and translated as per pt's wishes  CONSTITUTIONAL: No fevers or chills  RESPIRATORY: No shortness of breath  CARDIOVASCULAR: No chest pain.  GASTROINTESTINAL: No nausea, vomiting, diarrhea or abdominal pain.  (nausea o/n but resolved)  VASCULAR: No bilateral lower extremity edema.     VITALS:  T(F): 97 (20 @ 12:00), Max: 98.2 (20 @ 00:00)  HR: 60 (20 @ 16:00)  BP: 135/66 (20 @ 16:00)  RR: 15 (20 @ 16:00)  SpO2: 98% (20 16:00)  Wt(kg): --  Height (cm): 154.94 ( 17:47)  Weight (kg): 72.7 ( 23:30)  BMI (kg/m2): 30.3 ( 23:30)  BSA (m2): 1.72 ( 23:30)     @ 07:01  -   @ 07:00  --------------------------------------------------------  IN: 3010 mL / OUT: 1750 mL / NET: 1260 mL     @ 07:01  -   @ 16:29  --------------------------------------------------------  IN: 800 mL / OUT: 800 mL / NET: 0 mL      PHYSICAL EXAM:  Constitutional: NAD  Neurological: Awake and Alert  HEENT: anicteric sclera,   Respiratory: CTAB, no wheezes, rales or rhonchi  Cardiovascular: S1, S2, RRR  Gastrointestinal: BS+, soft, NT/ND  : No ventura.  Extremities: No peripheral edema    LABS:      131<L>  |  102  |  10  ----------------------------<  134<H>  3.5   |  21<L>  |  0.42<L>    Ca    6.2<LL>      2020 12:15  Phos  2.0       Mg     2.3         TPro  6.5  /  Alb  3.1<L>  /  TBili  0.3  /  DBili      /  AST  43<H>  /  ALT  37  /  AlkPhos  30<L>      Creatinine Trend: 0.42 <--, 0.65 <--, 0.78 <--, 0.92 <--, 0.73 <--, 0.64 <--, 0.70 <--, 0.68 <--, 0.70 <--                        11.0   6.65  )-----------( 237      ( 2020 06:25 )             31.8     Urine Studies:  Urinalysis Basic - ( 2020 20:15 )    Color: Yellow / Appearance: Clear / S.005 / pH:   Gluc:  / Ketone: Moderate  / Bili: Negative / Urobili: Negative   Blood:  / Protein: Negative / Nitrite: Negative   Leuk Esterase: Negative / RBC: 0-2 /HPF / WBC 0-2 /HPF   Sq Epi:  / Non Sq Epi: Few /HPF / Bacteria: Trace /HPF      Creatinine, Random Urine: <13 mg/dL ( @ 08:45)  Sodium, Random Urine: 26 mmol/L ( @ 08:45)  Osmolality, Random Urine: 102 mos/kg ( @ 08:44)  Sodium, Random Urine: 77 mmol/L ( @ 20:15)  Osmolality, Random Urine: 317 mos/kg ( @ 20:15)  Creatinine, Random Urine: 32 mg/dL ( @ 20:15)    RADIOLOGY & ADDITIONAL STUDIES:

## 2020-07-18 NOTE — PROGRESS NOTE ADULT - SUBJECTIVE AND OBJECTIVE BOX
· Reason for Admission	Nausea and vomiting x 2 days	      · Subjective and Objective: 	  INTERVAL HPI/OVERNIGHT EVENTS: Patient started on phenyleprhine for hypotension, now off. Patients sodium stable. S/p 1 dose DDAVP 2mcg yesterday. Still on D5 fluids.     PRESSORS: [] YES [x] NO     WHICH:     Antimicrobial:    Cardiovascular:  phenylephrine    Infusion 0.3 MICROgram(s)/kG/Min IV Continuous <Continuous>  sotalol 80 milliGRAM(s) Oral two times a day    Pulmonary:    Hematalogic:  apixaban 5 milliGRAM(s) Oral every 12 hours    Other:  chlorhexidine 2% Cloths 1 Application(s) Topical <User Schedule>  dextrose 5%. 1000 milliLiter(s) IV Continuous <Continuous>  latanoprost 0.005% Ophthalmic Solution 1 Drop(s) Both EYES at bedtime  levothyroxine 50 MICROGram(s) Oral daily  ondansetron Injectable 4 milliGRAM(s) IV Push three times a day PRN  timolol 0.5% Solution 1 Drop(s) Both EYES two times a day    apixaban 5 milliGRAM(s) Oral every 12 hours  chlorhexidine 2% Cloths 1 Application(s) Topical <User Schedule>  dextrose 5%. 1000 milliLiter(s) IV Continuous <Continuous>  latanoprost 0.005% Ophthalmic Solution 1 Drop(s) Both EYES at bedtime  levothyroxine 50 MICROGram(s) Oral daily  ondansetron Injectable 4 milliGRAM(s) IV Push three times a day PRN  phenylephrine    Infusion 0.3 MICROgram(s)/kG/Min IV Continuous <Continuous>  sotalol 80 milliGRAM(s) Oral two times a day  timolol 0.5% Solution 1 Drop(s) Both EYES two times a day    Drug Dosing Weight  Height (cm): 154.94 (2020 17:47)  Weight (kg): 72.7 (2020 23:30)  BMI (kg/m2): 30.3 (2020 23:30)  BSA (m2): 1.72 (2020 23:30)    CENTRAL LINE: [] YES [x] NO  LOCATION:   DATE INSERTED:  REMOVE: [] YES [] NO  EXPLAIN:    ARROYO: [] YES [x] NO    DATE INSERTED:  REMOVE:  [] YES [] NO  EXPLAIN: Strict IOs    A-LINE:  [] YES [x] NO  LOCATION:   DATE INSERTED:  REMOVE:  [] YES [] NO  EXPLAIN:    PMH -reviewed admission note, no change since admission  PAST MEDICAL & SURGICAL HISTORY:  HTN (hypertension)  No significant past surgical history      ICU Vital Signs Last 24 Hrs  T(C): 36.5 (2020 20:45), Max: 36.5 (2020 20:45)  T(F): 97.7 (2020 20:45), Max: 97.7 (2020 20:45)  HR: 59 (2020 22:00) (55 - 73)  BP: 109/57 (2020 22:00) (81/39 - 151/63)  BP(mean): 69 (2020 22:00) (49 - 103)  ABP: --  ABP(mean): --  RR: 16 (2020 22:00) (12 - 25)  SpO2: 98% (2020 22:00) (95% - 100%)            07-16 @ 07:01  -  07-17 @ 07:00  --------------------------------------------------------  IN: 900 mL / OUT: 750 mL / NET: 150 mL          PHYSICAL EXAM:    GENERAL: NAD, well-groomed, obese  HEAD:  Atraumatic, Normocephalic  EYES: EOMI, PERRLA, conjunctiva and sclera clear  ENMT: No tonsillar erythema, exudates, or enlargement; Moist mucous membranes, Good dentition, No lesions  NECK: Supple, normal appearance, No JVD; Normal thyroid; Trachea midline  NERVOUS SYSTEM:  Alert & Oriented X3, moving all 4 extremities  CHEST/LUNG: No chest deformity; Normal percussion bilaterally; No rales, rhonchi, wheezing   HEART: Regular rate and rhythm; No murmurs, rubs, or gallops  ABDOMEN: Soft, Nontender, Nondistended; Bowel sounds decreased but present  EXTREMITIES:  2+ Peripheral Pulses, No clubbing, cyanosis, or edema  LYMPH: No lymphadenopathy noted  SKIN: No rashes or lesions; Good capillary refill      LABS:  CBC Full  -  ( 2020 03:46 )  WBC Count : 6.46 K/uL  RBC Count : 4.04 M/uL  Hemoglobin : 11.7 g/dL  Hematocrit : 32.8 %  Platelet Count - Automated : 264 K/uL  Mean Cell Volume : 81.2 fl  Mean Cell Hemoglobin : 29.0 pg  Mean Cell Hemoglobin Concentration : 35.7 gm/dL  Auto Neutrophil # : x  Auto Lymphocyte # : x  Auto Monocyte # : x  Auto Eosinophil # : x  Auto Basophil # : x  Auto Neutrophil % : x  Auto Lymphocyte % : x  Auto Monocyte % : x  Auto Eosinophil % : x  Auto Basophil % : x        132<L>  |  100  |  15  ----------------------------<  91  4.8   |  26  |  0.78    Ca    8.6      2020 23:18  Phos  1.9       Mg     2.3         TPro  7.1  /  Alb  3.5  /  TBili  0.5  /  DBili  x   /  AST  48<H>  /  ALT  32  /  AlkPhos  35<L>        Urinalysis Basic - ( 2020 20:15 )    Color: Yellow / Appearance: Clear / S.005 / pH: x  Gluc: x / Ketone: Moderate  / Bili: Negative / Urobili: Negative   Blood: x / Protein: Negative / Nitrite: Negative   Leuk Esterase: Negative / RBC: 0-2 /HPF / WBC 0-2 /HPF   Sq Epi: x / Non Sq Epi: Few /HPF / Bacteria: Trace /HPF          RADIOLOGY & ADDITIONAL STUDIES REVIEWED:  ***      GOALS OF CARE DISCUSSION WITH PATIENT/FAMILY/PROXY:        Physical Exam:    Reference Recent Physical Exam:  · In accordance with current standards limiting patient contact please refer to the recent:	ED Physical Exam	    Assessment and Plan:   · Assessment		  Patient is being admitted to ICU with hyponatremia with serum sodium of 114 mg/dl likely secondary to SIADH due to vomiting. s/p 500 ml IV fluids in ED. Urine lytes shows dilute urine. serum osmolality sent. Will repeat BMP. Nephrology consult dr willard.    Assessment:  1. 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  2. Rule out intra-abdominal pathology for vomiting.  3. Hyperlipidemia  4. Hypertension.      Plan:    CNS:  pt has "memory problem" as per daughter  Patient is alert oriented x 3 now  pt had n/v, headache but resolved now  Monitor for any seizures, convulsion, or change in mental status.      CVS;  #Hypertension/hypotension  takes losartan 50mg-hctz 12.5mg and amlodipine 2.5mg at home.  Started on phenylephrine for hypotension  will hold amlodipine and hydrochlorothiazide.  monitor blood pressure.    #History of hyperlipidemia.  continue simvastatin  lipid panel WNL    # Possible hx of Afib  EKG showed normal sinus rhthym , no ischemic changes.  Patient is on Eliquis for 'heart problem' per family  c/w sotalol and Eliquis    Respiratory:  No active issues.  patient is saturating 100 % on room air.      GI:  - starting regular diet with fluid restriction    Nephrology:  # Hyponatremia 2/2 renal loss from HCTZ vs SIADH  - p/w serum sodium of 114, likely secondary  SIADH due to vomiting vs medication induced (HCTZ can cause increased urine sodium).  - Uosm 317, Kinga 77, blood osm 239 (low).   - pt was given NS 60cc/h and 2g Nacl tab  - Na 115 ( 7pm) ->114 (9pm) ->124 ( 4am) ->127 (8am)-> 132 ->132  - correcting too fast, c/w D5W 150cc/h  - BMP q 4 hourly.  Nephrology consult Dr willard.    #hypokalemia and hypophosphoremia  replaced.  will hold HCTZ      Heam/onc:  No issues  no active signs of bleeding and no baseline to compare.  will monitor CBC.    ID:  No active issues.  CT scan of abdomen : no acute infection or pathology, showed liver cysts, diverticulosis, severe joint arthrosis L5-S1    DVT prophylaxis:  Heparin sc for DVT prophylaxis.  Dcd Iv protonix as pt was started on diet    Dispo:  monitor in ICU

## 2020-07-18 NOTE — CHART NOTE - NSCHARTNOTEFT_GEN_A_CORE
Pt's sodium level was noted 125 at 9:30pm, which is still lower than the goal level (126-131 by the morning).   Pt was on NS @ 70cc/h, which we increased to 80cc/h and will follow the morning lab.   Pt will remain in ICU and the daughter Ms. Castillo ([phone]) was notified that pt will stay in ICU tonight.

## 2020-07-18 NOTE — CHART NOTE - NSCHARTNOTEFT_GEN_A_CORE
Hospital course:   79 years old Sudanese speaking female from home with PMHx of HTN (on Losartan), HLP ( Simvastatin), presented to ED with severe nausea and vomiting x 2 days. Patient ate 'simbosa' from outside 2 days ago after that she has been vomiting and not able to hold up anything. Patient admitted to having decreased PO intake because of vomiting. Patient denies having history of low serum sodium level or any recent change in medications. Patient does have some dizziness because of vomiting.  Patient denies any chest pain, shortness of breath, abdominal pain, change in urinary or bowel habits.  Patient was admitted with hyponatremia with serum sodium of 114 mg/dl likely secondary to SIADH due to vomiting vs medication induced (HCTZ can cause increased urine sodium). s/p 500 ml IV fluids in ED.    Pt was admitted to ICU for close monitoring of hyponatremia. Hyponatremia was likely from renal loss due to hydrochlorothiazide vs SIADH due to n/v. Uosm was 317, Kinga was 77, blood osm was 239 (low). pt was given NS 60cc/h and 2g Nacl tab and Na went up to 124, which is too fast and pt was started on D5W@125cc/h. Patient's Na was 131 today (7/18) at noon.     Patient is stable for downgrade to floor under care of  ------------- for further management , covering resident ---------- was informed.    <Problem/Plan>    <Things to follow up> Hospital course:   79 years old Burkinan speaking female from home with PMHx of HTN (on Losartan), HLP ( Simvastatin), presented to ED with severe nausea and vomiting x 2 days. Patient ate 'simbosa' from outside 2 days ago after that she has been vomiting and not able to hold up anything. Patient admitted to having decreased PO intake because of vomiting. Patient denies having history of low serum sodium level or any recent change in medications. Patient does have some dizziness because of vomiting.  Patient denies any chest pain, shortness of breath, abdominal pain, change in urinary or bowel habits.  Patient was admitted with hyponatremia with serum sodium of 114 mg/dl likely secondary to SIADH due to vomiting vs medication induced (HCTZ can cause increased urine sodium). s/p 500 ml IV fluids in ED.    Pt was admitted to ICU for close monitoring of hyponatremia. Hyponatremia was likely from renal loss due to hydrochlorothiazide vs SIADH due to n/v. Uosm was 317, Kinga was 77, blood osm was 239 (low). pt was given NS 60cc/h and 2g Nacl tab and Na went up to 124, which is too fast and pt was started on D5W@125cc/h. Patients sodium stable. S/p 1 dose DDAVP 2mcg yesterday. Patient's Na was 131 today (7/18) at noon.  Patient started on phenyleprhine for hypotension, now off.    Patient is stable for downgrade to floor under care of  ------------- for further management , covering resident ---------- was informed.      Things to follow up  -BMP q6hrs  -Monitor BP. c/w losartan, hold HCTZ due to hyponatremia Hospital course:   79 years old Malawian speaking female from home with PMHx of HTN (on Losartan), HLP ( Simvastatin), presented to ED with severe nausea and vomiting x 2 days. Patient ate 'simbosa' from outside 2 days ago after that she has been vomiting and not able to hold up anything. Patient admitted to having decreased PO intake because of vomiting. Patient denies having history of low serum sodium level or any recent change in medications. Patient does have some dizziness because of vomiting.  Patient denies any chest pain, shortness of breath, abdominal pain, change in urinary or bowel habits.  Patient was admitted with hyponatremia with serum sodium of 114 mg/dl likely secondary to SIADH due to vomiting vs medication induced (HCTZ can cause increased urine sodium). s/p 500 ml IV fluids in ED.    Pt was admitted to ICU for close monitoring of hyponatremia. Hyponatremia was likely from renal loss due to hydrochlorothiazide vs SIADH due to n/v. Uosm was 317, Kinga was 77, blood osm was 239 (low). pt was given NS 60cc/h and 2g Nacl tab and Na went up to 124, which is too fast and pt was started on D5W@125cc/h. Patients sodium stable. S/p 1 dose DDAVP 2mcg yesterday. Patient's Na was 131 today (7/18) at noon.  Patient started on phenyleprhine for hypotension, now off.    Patient is stable for downgrade to floor under care of  ------------- for further management , covering resident ---------- was informed.      Things to follow up  -BMP at 6pm if stable repeat q12hrs  -Monitor BP. c/w losartan, hold HCTZ due to hyponatremia  -goal Na till 7pm is 131. pt at goal currently Hospital course:   79 years old Martiniquais speaking female from home with PMHx of HTN (on Losartan), HLP ( Simvastatin), presented to ED with severe nausea and vomiting x 2 days. Patient ate 'simbosa' from outside 2 days ago after that she has been vomiting and not able to hold up anything. Patient admitted to having decreased PO intake because of vomiting. Patient denies having history of low serum sodium level or any recent change in medications. Patient does have some dizziness because of vomiting.  Patient denies any chest pain, shortness of breath, abdominal pain, change in urinary or bowel habits.  Patient was admitted with hyponatremia with serum sodium of 114 mg/dl likely secondary to SIADH due to vomiting vs medication induced (HCTZ can cause increased urine sodium). s/p 500 ml IV fluids in ED.    Pt was admitted to ICU for close monitoring of hyponatremia. Hyponatremia was likely from renal loss due to hydrochlorothiazide vs SIADH due to n/v. Uosm was 317, Kinga was 77, blood osm was 239 (low). pt was given NS 60cc/h and 2g Nacl tab and Na went up to 124, which is too fast and pt was started on D5W@125cc/h. Patients sodium stable. S/p 1 dose DDAVP 2mcg yesterday. Patient's Na was 131 today (7/18) at noon.  Patient started on phenyleprhine for hypotension, now off.    Patient is stable for downgrade to floor under care of Dr. Chavez for further management , covering resident Dr. Juarez was informed.      Things to follow up  -BMP at 6pm if stable repeat q12hrs  -Monitor BP. c/w losartan, hold HCTZ due to hyponatremia  -goal Na till 7pm is 131. pt at goal currently Hospital course:   79 years old French speaking female from home with PMHx of HTN (on Losartan), HLP ( Simvastatin), presented to ED with severe nausea and vomiting x 2 days. Patient ate 'simbosa' from outside 2 days ago after that she has been vomiting and not able to hold up anything. Patient admitted to having decreased PO intake because of vomiting. Patient denies having history of low serum sodium level or any recent change in medications. Patient does have some dizziness because of vomiting.  Patient denies any chest pain, shortness of breath, abdominal pain, change in urinary or bowel habits.  Patient was admitted with hyponatremia with serum sodium of 114 mg/dl likely secondary to SIADH due to vomiting vs medication induced (HCTZ can cause increased urine sodium). s/p 500 ml IV fluids in ED.    Pt was admitted to ICU for close monitoring of hyponatremia. Hyponatremia was likely from renal loss due to hydrochlorothiazide vs SIADH due to n/v. Uosm was 317, Kinga was 77, blood osm was 239 (low). pt was given NS 60cc/h and 2g Nacl tab and Na went up to 124, which is too fast and pt was started on D5W@125cc/h. Patients sodium stable. S/p 1 dose DDAVP 2mcg yesterday. Patient's Na was 131 today (7/19) in am. will start on salt tabs 1 g TID.     Patient is stable for downgrade to floor under care of Dr. Chavez for further management , covering resident Dr. Altamirano was informed.      Things to follow up  -BMP at 6pm if stable repeat q24hrs  -Monitor BP. c/w losartan, hold HCTZ due to hyponatremia  -pt at goal currently, cw salt tabs 1g TID

## 2020-07-18 NOTE — PROGRESS NOTE ADULT - ASSESSMENT
Patient is a 80yo Guatemalan speaking Female with HTN on Losartan/ HCTZ, HLD p/w n/v x 2 days. Pt found to have serum Na 115. Nephrology consulted for hyponatremia    1. Hyponatremia- likely hypovolemic hyponatremia due to n/v and HCTZ use. Continue to hold HCTZ. Pt with rapid increase in Serum Na (asymptomatic) requiring DDAP 2mcg IV x1 (7/17) and hypotonic fluid. Serum  Na 131 @ goal.   Check BMP q12hrs. Goal 126-131. Monitor serum Na    2. Hypokalemia- borderline. Recc KCl 20 meq PO x1. Phosphorus repleted. Monitor lytes.     3. Hypophosphatemia- pt given phos PO repletion. Monitor serum phos    4. HTN- BP acceptable. c/w Losartan. Continue to hold HCTZ due to hyponatremia. Monitor BP    5. Hypocalcemia- Corrected Serum Ca 6.92. Recc PO calcium repletion. Monitor ionized Ca/ check PTHi and 25 OH vit D. Monitor ionized Ca. Patient is a 80yo Japanese speaking Female with HTN on Losartan/ HCTZ, HLD p/w n/v x 2 days. Pt found to have serum Na 115. Nephrology consulted for hyponatremia    1. Hyponatremia- likely hypovolemic hyponatremia due to n/v and HCTZ use. Continue to hold HCTZ. Pt with rapid increase in Serum Na (asymptomatic) requiring DDAP 2mcg IV x1 (7/17) and hypotonic fluid. Serum  Na 131 @ goal. Monitor off IVF. Check BMP q12hrs. Goal 126-131. Monitor serum Na    2. Hypokalemia- borderline. Recc KCl 20 meq PO x1. Phosphorus repleted. Monitor lytes.     3. Hypophosphatemia- pt given phos PO repletion. Monitor serum phos    4. HTN- BP acceptable. c/w Losartan. Continue to hold HCTZ due to hyponatremia. Monitor BP    5. Hypocalcemia- Corrected Serum Ca 6.92. Recc PO calcium repletion. Monitor ionized Ca/ check PTHi and 25 OH vit D. Monitor ionized Ca.

## 2020-07-18 NOTE — PROGRESS NOTE ADULT - SUBJECTIVE AND OBJECTIVE BOX
INTERVAL HPI/OVERNIGHT EVENTS: Patient started on phenyleprhine for hypotension. Patients sodium stable. S/p 1 dose DDAVP 2mcg yesterday. Still on D5 fluids.     PRESSORS: [x] YES [] NO     WHICH: phenylephrine    Infusion 0.3 MICROgram(s)/kG/Min IV Continuous <Continuous>    Antimicrobial:    Cardiovascular:  phenylephrine    Infusion 0.3 MICROgram(s)/kG/Min IV Continuous <Continuous>  sotalol 80 milliGRAM(s) Oral two times a day    Pulmonary:    Hematalogic:  apixaban 5 milliGRAM(s) Oral every 12 hours    Other:  chlorhexidine 2% Cloths 1 Application(s) Topical <User Schedule>  dextrose 5%. 1000 milliLiter(s) IV Continuous <Continuous>  latanoprost 0.005% Ophthalmic Solution 1 Drop(s) Both EYES at bedtime  levothyroxine 50 MICROGram(s) Oral daily  ondansetron Injectable 4 milliGRAM(s) IV Push three times a day PRN  timolol 0.5% Solution 1 Drop(s) Both EYES two times a day    apixaban 5 milliGRAM(s) Oral every 12 hours  chlorhexidine 2% Cloths 1 Application(s) Topical <User Schedule>  dextrose 5%. 1000 milliLiter(s) IV Continuous <Continuous>  latanoprost 0.005% Ophthalmic Solution 1 Drop(s) Both EYES at bedtime  levothyroxine 50 MICROGram(s) Oral daily  ondansetron Injectable 4 milliGRAM(s) IV Push three times a day PRN  phenylephrine    Infusion 0.3 MICROgram(s)/kG/Min IV Continuous <Continuous>  sotalol 80 milliGRAM(s) Oral two times a day  timolol 0.5% Solution 1 Drop(s) Both EYES two times a day    Drug Dosing Weight  Height (cm): 154.94 (2020 17:47)  Weight (kg): 72.7 (2020 23:30)  BMI (kg/m2): 30.3 (2020 23:30)  BSA (m2): 1.72 (2020 23:30)    CENTRAL LINE: [] YES [x] NO  LOCATION:   DATE INSERTED:  REMOVE: [] YES [] NO  EXPLAIN:    ARROYO: [] YES [x] NO    DATE INSERTED:  REMOVE:  [] YES [] NO  EXPLAIN: Strict IOs    A-LINE:  [] YES [x] NO  LOCATION:   DATE INSERTED:  REMOVE:  [] YES [] NO  EXPLAIN:    PMH -reviewed admission note, no change since admission  PAST MEDICAL & SURGICAL HISTORY:  HTN (hypertension)  No significant past surgical history      ICU Vital Signs Last 24 Hrs  T(C): 36.5 (2020 20:45), Max: 36.5 (2020 20:45)  T(F): 97.7 (2020 20:45), Max: 97.7 (2020 20:45)  HR: 59 (2020 22:00) (55 - 73)  BP: 109/57 (2020 22:00) (81/39 - 151/63)  BP(mean): 69 (2020 22:00) (49 - 103)  ABP: --  ABP(mean): --  RR: 16 (2020 22:00) (12 - 25)  SpO2: 98% (2020 22:00) (95% - 100%)            07-16 @ 07:01  -  07-17 @ 07:00  --------------------------------------------------------  IN: 900 mL / OUT: 750 mL / NET: 150 mL          PHYSICAL EXAM:    GENERAL: NAD, well-groomed, obese  HEAD:  Atraumatic, Normocephalic  EYES: EOMI, PERRLA, conjunctiva and sclera clear  ENMT: No tonsillar erythema, exudates, or enlargement; Moist mucous membranes, Good dentition, No lesions  NECK: Supple, normal appearance, No JVD; Normal thyroid; Trachea midline  NERVOUS SYSTEM:  Alert & Oriented X3, moving all 4 extremities  CHEST/LUNG: No chest deformity; Normal percussion bilaterally; No rales, rhonchi, wheezing   HEART: Regular rate and rhythm; No murmurs, rubs, or gallops  ABDOMEN: Soft, Nontender, Nondistended; Bowel sounds decreased but present  EXTREMITIES:  2+ Peripheral Pulses, No clubbing, cyanosis, or edema  LYMPH: No lymphadenopathy noted  SKIN: No rashes or lesions; Good capillary refill      LABS:  CBC Full  -  ( 2020 03:46 )  WBC Count : 6.46 K/uL  RBC Count : 4.04 M/uL  Hemoglobin : 11.7 g/dL  Hematocrit : 32.8 %  Platelet Count - Automated : 264 K/uL  Mean Cell Volume : 81.2 fl  Mean Cell Hemoglobin : 29.0 pg  Mean Cell Hemoglobin Concentration : 35.7 gm/dL  Auto Neutrophil # : x  Auto Lymphocyte # : x  Auto Monocyte # : x  Auto Eosinophil # : x  Auto Basophil # : x  Auto Neutrophil % : x  Auto Lymphocyte % : x  Auto Monocyte % : x  Auto Eosinophil % : x  Auto Basophil % : x        132<L>  |  100  |  15  ----------------------------<  91  4.8   |  26  |  0.78    Ca    8.6      2020 23:18  Phos  1.9       Mg     2.3         TPro  7.1  /  Alb  3.5  /  TBili  0.5  /  DBili  x   /  AST  48<H>  /  ALT  32  /  AlkPhos  35<L>        Urinalysis Basic - ( 2020 20:15 )    Color: Yellow / Appearance: Clear / S.005 / pH: x  Gluc: x / Ketone: Moderate  / Bili: Negative / Urobili: Negative   Blood: x / Protein: Negative / Nitrite: Negative   Leuk Esterase: Negative / RBC: 0-2 /HPF / WBC 0-2 /HPF   Sq Epi: x / Non Sq Epi: Few /HPF / Bacteria: Trace /HPF          RADIOLOGY & ADDITIONAL STUDIES REVIEWED:  ***      GOALS OF CARE DISCUSSION WITH PATIENT/FAMILY/PROXY: INTERVAL HPI/OVERNIGHT EVENTS: Patient started on phenyleprhine for hypotension, now off. Patients sodium stable. S/p 1 dose DDAVP 2mcg yesterday. Still on D5 fluids.     PRESSORS: [] YES [x] NO     WHICH:     Antimicrobial:    Cardiovascular:  phenylephrine    Infusion 0.3 MICROgram(s)/kG/Min IV Continuous <Continuous>  sotalol 80 milliGRAM(s) Oral two times a day    Pulmonary:    Hematalogic:  apixaban 5 milliGRAM(s) Oral every 12 hours    Other:  chlorhexidine 2% Cloths 1 Application(s) Topical <User Schedule>  dextrose 5%. 1000 milliLiter(s) IV Continuous <Continuous>  latanoprost 0.005% Ophthalmic Solution 1 Drop(s) Both EYES at bedtime  levothyroxine 50 MICROGram(s) Oral daily  ondansetron Injectable 4 milliGRAM(s) IV Push three times a day PRN  timolol 0.5% Solution 1 Drop(s) Both EYES two times a day    apixaban 5 milliGRAM(s) Oral every 12 hours  chlorhexidine 2% Cloths 1 Application(s) Topical <User Schedule>  dextrose 5%. 1000 milliLiter(s) IV Continuous <Continuous>  latanoprost 0.005% Ophthalmic Solution 1 Drop(s) Both EYES at bedtime  levothyroxine 50 MICROGram(s) Oral daily  ondansetron Injectable 4 milliGRAM(s) IV Push three times a day PRN  phenylephrine    Infusion 0.3 MICROgram(s)/kG/Min IV Continuous <Continuous>  sotalol 80 milliGRAM(s) Oral two times a day  timolol 0.5% Solution 1 Drop(s) Both EYES two times a day    Drug Dosing Weight  Height (cm): 154.94 (2020 17:47)  Weight (kg): 72.7 (2020 23:30)  BMI (kg/m2): 30.3 (2020 23:30)  BSA (m2): 1.72 (2020 23:30)    CENTRAL LINE: [] YES [x] NO  LOCATION:   DATE INSERTED:  REMOVE: [] YES [] NO  EXPLAIN:    ARROYO: [] YES [x] NO    DATE INSERTED:  REMOVE:  [] YES [] NO  EXPLAIN: Strict IOs    A-LINE:  [] YES [x] NO  LOCATION:   DATE INSERTED:  REMOVE:  [] YES [] NO  EXPLAIN:    PMH -reviewed admission note, no change since admission  PAST MEDICAL & SURGICAL HISTORY:  HTN (hypertension)  No significant past surgical history      ICU Vital Signs Last 24 Hrs  T(C): 36.5 (2020 20:45), Max: 36.5 (2020 20:45)  T(F): 97.7 (2020 20:45), Max: 97.7 (2020 20:45)  HR: 59 (2020 22:00) (55 - 73)  BP: 109/57 (2020 22:00) (81/39 - 151/63)  BP(mean): 69 (2020 22:00) (49 - 103)  ABP: --  ABP(mean): --  RR: 16 (2020 22:00) (12 - 25)  SpO2: 98% (2020 22:00) (95% - 100%)            07-16 @ 07:01  -  07-17 @ 07:00  --------------------------------------------------------  IN: 900 mL / OUT: 750 mL / NET: 150 mL          PHYSICAL EXAM:    GENERAL: NAD, well-groomed, obese  HEAD:  Atraumatic, Normocephalic  EYES: EOMI, PERRLA, conjunctiva and sclera clear  ENMT: No tonsillar erythema, exudates, or enlargement; Moist mucous membranes, Good dentition, No lesions  NECK: Supple, normal appearance, No JVD; Normal thyroid; Trachea midline  NERVOUS SYSTEM:  Alert & Oriented X3, moving all 4 extremities  CHEST/LUNG: No chest deformity; Normal percussion bilaterally; No rales, rhonchi, wheezing   HEART: Regular rate and rhythm; No murmurs, rubs, or gallops  ABDOMEN: Soft, Nontender, Nondistended; Bowel sounds decreased but present  EXTREMITIES:  2+ Peripheral Pulses, No clubbing, cyanosis, or edema  LYMPH: No lymphadenopathy noted  SKIN: No rashes or lesions; Good capillary refill      LABS:  CBC Full  -  ( 2020 03:46 )  WBC Count : 6.46 K/uL  RBC Count : 4.04 M/uL  Hemoglobin : 11.7 g/dL  Hematocrit : 32.8 %  Platelet Count - Automated : 264 K/uL  Mean Cell Volume : 81.2 fl  Mean Cell Hemoglobin : 29.0 pg  Mean Cell Hemoglobin Concentration : 35.7 gm/dL  Auto Neutrophil # : x  Auto Lymphocyte # : x  Auto Monocyte # : x  Auto Eosinophil # : x  Auto Basophil # : x  Auto Neutrophil % : x  Auto Lymphocyte % : x  Auto Monocyte % : x  Auto Eosinophil % : x  Auto Basophil % : x        132<L>  |  100  |  15  ----------------------------<  91  4.8   |  26  |  0.78    Ca    8.6      2020 23:18  Phos  1.9       Mg     2.3         TPro  7.1  /  Alb  3.5  /  TBili  0.5  /  DBili  x   /  AST  48<H>  /  ALT  32  /  AlkPhos  35<L>        Urinalysis Basic - ( 2020 20:15 )    Color: Yellow / Appearance: Clear / S.005 / pH: x  Gluc: x / Ketone: Moderate  / Bili: Negative / Urobili: Negative   Blood: x / Protein: Negative / Nitrite: Negative   Leuk Esterase: Negative / RBC: 0-2 /HPF / WBC 0-2 /HPF   Sq Epi: x / Non Sq Epi: Few /HPF / Bacteria: Trace /HPF          RADIOLOGY & ADDITIONAL STUDIES REVIEWED:  ***      GOALS OF CARE DISCUSSION WITH PATIENT/FAMILY/PROXY:

## 2020-07-19 LAB
ANION GAP SERPL CALC-SCNC: 7 MMOL/L — SIGNIFICANT CHANGE UP (ref 5–17)
ANION GAP SERPL CALC-SCNC: 7 MMOL/L — SIGNIFICANT CHANGE UP (ref 5–17)
BUN SERPL-MCNC: 12 MG/DL — SIGNIFICANT CHANGE UP (ref 7–18)
BUN SERPL-MCNC: 16 MG/DL — SIGNIFICANT CHANGE UP (ref 7–18)
CALCIUM SERPL-MCNC: 8.7 MG/DL — SIGNIFICANT CHANGE UP (ref 8.4–10.5)
CALCIUM SERPL-MCNC: 8.8 MG/DL — SIGNIFICANT CHANGE UP (ref 8.4–10.5)
CHLORIDE SERPL-SCNC: 100 MMOL/L — SIGNIFICANT CHANGE UP (ref 96–108)
CHLORIDE SERPL-SCNC: 99 MMOL/L — SIGNIFICANT CHANGE UP (ref 96–108)
CO2 SERPL-SCNC: 27 MMOL/L — SIGNIFICANT CHANGE UP (ref 22–31)
CO2 SERPL-SCNC: 27 MMOL/L — SIGNIFICANT CHANGE UP (ref 22–31)
CREAT SERPL-MCNC: 0.79 MG/DL — SIGNIFICANT CHANGE UP (ref 0.5–1.3)
CREAT SERPL-MCNC: 1.01 MG/DL — SIGNIFICANT CHANGE UP (ref 0.5–1.3)
GLUCOSE SERPL-MCNC: 102 MG/DL — HIGH (ref 70–99)
GLUCOSE SERPL-MCNC: 117 MG/DL — HIGH (ref 70–99)
POTASSIUM SERPL-MCNC: 4.4 MMOL/L — SIGNIFICANT CHANGE UP (ref 3.5–5.3)
POTASSIUM SERPL-MCNC: 4.6 MMOL/L — SIGNIFICANT CHANGE UP (ref 3.5–5.3)
POTASSIUM SERPL-SCNC: 4.4 MMOL/L — SIGNIFICANT CHANGE UP (ref 3.5–5.3)
POTASSIUM SERPL-SCNC: 4.6 MMOL/L — SIGNIFICANT CHANGE UP (ref 3.5–5.3)
SODIUM SERPL-SCNC: 133 MMOL/L — LOW (ref 135–145)
SODIUM SERPL-SCNC: 134 MMOL/L — LOW (ref 135–145)

## 2020-07-19 RX ORDER — SODIUM CHLORIDE 9 MG/ML
1 INJECTION INTRAMUSCULAR; INTRAVENOUS; SUBCUTANEOUS THREE TIMES A DAY
Refills: 0 | Status: DISCONTINUED | OUTPATIENT
Start: 2020-07-19 | End: 2020-07-20

## 2020-07-19 RX ORDER — SENNA PLUS 8.6 MG/1
2 TABLET ORAL AT BEDTIME
Refills: 0 | Status: DISCONTINUED | OUTPATIENT
Start: 2020-07-19 | End: 2020-07-21

## 2020-07-19 RX ADMIN — APIXABAN 5 MILLIGRAM(S): 2.5 TABLET, FILM COATED ORAL at 05:24

## 2020-07-19 RX ADMIN — SENNA PLUS 2 TABLET(S): 8.6 TABLET ORAL at 22:51

## 2020-07-19 RX ADMIN — SODIUM CHLORIDE 1 GRAM(S): 9 INJECTION INTRAMUSCULAR; INTRAVENOUS; SUBCUTANEOUS at 13:06

## 2020-07-19 RX ADMIN — Medication 50 MICROGRAM(S): at 05:24

## 2020-07-19 RX ADMIN — Medication 1 DROP(S): at 17:24

## 2020-07-19 RX ADMIN — SODIUM CHLORIDE 1 GRAM(S): 9 INJECTION INTRAMUSCULAR; INTRAVENOUS; SUBCUTANEOUS at 22:49

## 2020-07-19 RX ADMIN — Medication 80 MILLIGRAM(S): at 17:24

## 2020-07-19 RX ADMIN — APIXABAN 5 MILLIGRAM(S): 2.5 TABLET, FILM COATED ORAL at 17:23

## 2020-07-19 RX ADMIN — LATANOPROST 1 DROP(S): 0.05 SOLUTION/ DROPS OPHTHALMIC; TOPICAL at 22:52

## 2020-07-19 RX ADMIN — Medication 1 DROP(S): at 05:24

## 2020-07-19 NOTE — PROGRESS NOTE ADULT - SUBJECTIVE AND OBJECTIVE BOX
[facility] NEPHROLOGY- PROGRESS NOTE    Patient is a 78yo [place]cuadorean speaking Female with HTN on Losartan/ HCTZ, HLD p/w n/v x 2 days. Pt found to have serum Na 115. Nephrology consulted for hyponatremia    Hospital Medications: Medications reviewed.  REVIEW OF SYSTEMS: Grand-daughter at bedside and translated as per pt's wishes  CONSTITUTIONAL: No fevers or chills  RESPIRATORY: No shortness of breath  CARDIOVASCULAR: No chest pain.  GASTROINTESTINAL: No nausea, vomiting, diarrhea or abdominal pain. +constipation  VASCULAR: No bilateral lower extremity edema.     VITALS:  T(F): 97.4 (20 @ 14:32), Max: 98.2 (20 @ 11:13)  HR: 69 (20 @ 14:32)  BP: 131/60 (20 @ 14:32)  RR: 17 (20 @ 14:32)  SpO2: 98% (20 @ 14:32)  Wt(kg): --     @ 07:  -   @ 07:00  --------------------------------------------------------  IN: 1770 mL / OUT: 1725 mL / NET: 45 mL     @ 07:01  -   @ 15:17  --------------------------------------------------------  IN: 340 mL / OUT: 600 mL / NET: -260 mL      PHYSICAL EXAM:  Constitutional: NAD  Neurological: Awake and Alert  HEENT: anicteric sclera,   Respiratory: CTAB, no wheezes, rales or rhonchi  Cardiovascular: S1, S2, RRR  Gastrointestinal: BS+, soft, NT/ND  : No ventura.  Extremities: No peripheral edema    LABS:    133 <--, 131 <--, 125 <--, 125 <--, 131 <--, 129 <--, 132 <--  133<L>  |  99  |  12  ----------------------------<  117<H>  4.6   |  27  |  0.79    Ca    8.7      2020 13:21  Phos  2.4       Mg     2.2         TPro  6.5  /  Alb  3.2<L>  /  TBili  0.3  /  DBili      /  AST  33  /  ALT  39  /  AlkPhos  30<L>      Creatinine Trend: 0.79 <--, 0.64 <--, 0.73 <--, 0.77 <--, 0.42 <--, 0.65 <--, 0.78 <--, 0.92 <--, 0.73 <--, 0.64 <--, 0.70 <--, 0.68 <--, 0.70 <--                        11.2   7.09  )-----------( 254      ( 2020 06:35 )             32.5     Urine Studies:  Urinalysis Basic - ( 2020 20:15 )    Color: Yellow / Appearance: Clear / S.005 / pH:   Gluc:  / Ketone: Moderate  / Bili: Negative / Urobili: Negative   Blood:  / Protein: Negative / Nitrite: Negative   Leuk Esterase: Negative / RBC: 0-2 /HPF / WBC 0-2 /HPF   Sq Epi:  / Non Sq Epi: Few /HPF / Bacteria: Trace /HPF      Creatinine, Random Urine: <13 mg/dL ( @ 08:45)  Sodium, Random Urine: 26 mmol/L ( @ 08:45)  Osmolality, Random Urine: 102 mos/kg ( @ 08:44)  Sodium, Random Urine: 77 mmol/L ( @ 20:15)  Osmolality, Random Urine: 317 mos/kg ( @ 20:15)  Creatinine, Random Urine: 32 mg/dL ( @ 20:15)

## 2020-07-19 NOTE — PROGRESS NOTE ADULT - ASSESSMENT
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 s/p 500 ml IV fluids in ED. Urine lytes shows dilute urine. serum osmolality sent. Will repeat BMP. Nephrology consult dr willard.    Assessment:  1. 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  2. Rule out intra-abdominal pathology for vomiting.  3. Hyperlipidemia  4. Hypertension.      Plan:    CNS:  pt has "memory problem" as per daughter  Patient is alert oriented x 3 now  pt had n/v, headache but resolved now  Monitor for any seizures, convulsion, or change in mental status.      CVS;  #Hypertension/hypotension  takes losartan 50mg-hctz 12.5mg and amlodipine 2.5mg at home.  Started on phenylephrine for hypotension  will hold amlodipine and hydrochlorothiazide.  monitor blood pressure.    #History of hyperlipidemia.  continue simvastatin  lipid panel WNL    # Possible hx of Afib  EKG showed normal sinus rhthym , no ischemic changes.  Patient is on Eliquis for 'heart problem' per family  c/w sotalol and Eliquis    Respiratory:  No active issues.  patient is saturating 100 % on room air.      GI:  - starting regular diet with fluid restriction    Nephrology:  # Hyponatremia 2/2 renal loss from HCTZ vs SIADH  - p/w serum sodium of 114, likely secondary  SIADH due to vomiting vs medication induced (HCTZ can cause increased urine sodium).  - Uosm 317, Kinga 77, blood osm 239 (low).   - pt was given NS 60cc/h and 2g Nacl tab  - Na 115 (7/16 7pm) ->114 (9pm) ->124 (7/17 4am) ->127 (8am)-> 132 ->132  - correcting too fast, c/w D5W 150cc/h  - BMP q 4 hourly.  Nephrology consult Dr willard.    #hypokalemia and hypophosphoremia  replaced.  will hold HCTZ      Heam/onc:  No issues  no active signs of bleeding and no baseline to compare.  will monitor CBC.    ID:  No active issues.  CT scan of abdomen : no acute infection or pathology, showed liver cysts, diverticulosis, severe joint arthrosis L5-S1    DVT prophylaxis:  Heparin sc for DVT prophylaxis.  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 s/p 500 ml IV fluids in ED. Urine lytes shows dilute urine. serum osmolality sent. Will repeat BMP. Nephrology consult dr willard.    Assessment:  1. 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  2. Rule out intra-abdominal pathology for vomiting.  3. Hyperlipidemia  4. Hypertension.      Plan:    CNS:  pt has "memory problem" as per daughter  Patient is alert oriented x 3 now  pt had n/v, headache but resolved now  Monitor for any seizures, convulsion, or change in mental status.      CVS;  #Hypertension/hypotension  takes losartan 50mg-hctz 12.5mg and amlodipine 2.5mg at home.  Started on phenylephrine for hypotension, now it's DCd  will hold amlodipine and hydrochlorothiazide.  monitor blood pressure.    #History of hyperlipidemia.  continue simvastatin  lipid panel WNL    # Possible hx of Afib  EKG showed normal sinus rhthym , no ischemic changes.  Patient is on Eliquis for 'heart problem' per family  c/w sotalol and Eliquis    Respiratory:  No active issues.  patient is saturating 100 % on room air.      GI:  - starting regular diet with fluid restriction    Nephrology:  # Hyponatremia 2/2 renal loss from HCTZ vs SIADH  - p/w serum sodium of 114, likely secondary  SIADH due to vomiting vs medication induced (HCTZ can cause increased urine sodium).  - Uosm 317, Kinga 77, blood osm 239 (low).   - pt was given NS 60cc/h and 2g Nacl tab  - Na 115 (7/16 7pm) ->114 (9pm) ->124 (7/17 4am) ->127 (8am)-> 132 ->132->129(7/19 6am)->125->125(9:30pm)  - correcting too fast, s/p D5W 150cc/h, now Na is low again, started on NS 70cc/hr, which was increased to 80cc/h overnight  - f/u BMP in AM   Nephrology  Dr willard.    #hypokalemia and hypophosphoremia  replaced.  will hold HCTZ      Heam/onc:  No issues  no active signs of bleeding and no baseline to compare.  will monitor CBC.    ID:  No active issues.  CT scan of abdomen : no acute infection or pathology, showed liver cysts, diverticulosis, severe joint arthrosis L5-S1    DVT prophylaxis:  Heparin sc for DVT prophylaxis.  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 s/p 500 ml IV fluids in ED. Urine lytes shows dilute urine. serum osmolality sent. Will repeat BMP. Nephrology consult dr willard.    Assessment:  1. 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  2. Rule out intra-abdominal pathology for vomiting.  3. Hyperlipidemia  4. Hypertension.      Plan:    CNS:  pt has "memory problem" as per daughter  Patient is alert oriented x 3 now  pt had n/v, headache but resolved now  Monitor for any seizures, convulsion, or change in mental status.      CVS;  #Hypertension/hypotension  takes losartan 50mg-hctz 12.5mg and amlodipine 2.5mg at home.  Started on phenylephrine for hypotension, now it's DCd  will hold amlodipine and hydrochlorothiazide.  monitor blood pressure.    #History of hyperlipidemia.  continue simvastatin  lipid panel WNL    # Possible hx of Afib  EKG showed normal sinus rhthym , no ischemic changes.  Patient is on Eliquis for 'heart problem' per family  c/w sotalol and Eliquis    Respiratory:  No active issues.  patient is saturating 100 % on room air.      GI:  - starting regular diet with fluid restriction    Nephrology:  # Hyponatremia 2/2 renal loss from HCTZ vs SIADH  - p/w serum sodium of 114, likely secondary  SIADH due to vomiting vs medication induced (HCTZ can cause increased urine sodium).  - Uosm 317, Kinga 77, blood osm 239 (low), pt was given NS 60cc/h and 2g Nacl tab  - Na 115 (7/16 7pm) ->114 (9pm) ->124 (7/17 4am) ->127 (8am)-> 132 ->132->129(7/19 6am)->125->125(9:30pm)- > 131 (7/19) - will hold fluids and add 1g salt tabs TID, fu 6pm BMp   - f/u BMP in 6pm   Nephrology  Dr willard.    #hypokalemia and hypophosphoremia  replaced.  will hold HCTZ      Heam/onc:  No issues  no active signs of bleeding and no baseline to compare.  will monitor CBC.    ID:  No active issues.  CT scan of abdomen : no acute infection or pathology, showed liver cysts, diverticulosis, severe joint arthrosis L5-S1    DVT prophylaxis:  Heparin sc for DVT prophylaxis.  Dcd Iv protonix as pt was started on diet    Dispo:  Downgrade to medicine

## 2020-07-19 NOTE — PROGRESS NOTE ADULT - ASSESSMENT
Patient is a 78yo Malagasy speaking Female with HTN on Losartan/ HCTZ, HLD p/w n/v x 2 days. Pt found to have serum Na 115. Nephrology consulted for hyponatremia    1. Hyponatremia- likely hypovolemic hyponatremia due to n/v and HCTZ use. Continue to hold HCTZ. Pt with rapid increase in Serum Na (asymptomatic) requiring DDAP 2mcg IV x1 (7/17) and hypotonic fluid. Now off IVF. Serum Na slow improving on NaCl 1g PO tid. Check BMP q12hrs.Monitor serum Na    2. Hypokalemia- resolved. Phosphorus repleted. Monitor lytes.     3. Hypophosphatemia- pt given phos PO repletion. Monitor serum phos    4. HTN- BP acceptable. c/w Losartan. Continue to hold HCTZ due to hyponatremia. Monitor BP    5. Hypocalcemia-resolved. Monitor ionized Ca.

## 2020-07-19 NOTE — PROGRESS NOTE ADULT - SUBJECTIVE AND OBJECTIVE BOX
· Reason for Admission	Nausea and vomiting x 2 days	      · Subjective and Objective: 	  INTERVAL HPI/OVERNIGHT EVENTS:     - pt's Na noted 125 at 10pm, same as the last result. NS rate was increased to 80cc/hr  - Pt was planned to be downgraded, but we decided to monitor the pt in ICU overnight. Pt's daughter was notified.      Antimicrobial:    Cardiovascular:  sotalol 80 milliGRAM(s) Oral two times a day    Pulmonary:    Hematalogic:  apixaban 5 milliGRAM(s) Oral every 12 hours    Other:  latanoprost 0.005% Ophthalmic Solution 1 Drop(s) Both EYES at bedtime  levothyroxine 50 MICROGram(s) Oral daily  ondansetron Injectable 4 milliGRAM(s) IV Push three times a day PRN  sodium chloride 0.9%. 1000 milliLiter(s) IV Continuous <Continuous>  timolol 0.5% Solution 1 Drop(s) Both EYES two times a day    apixaban 5 milliGRAM(s) Oral every 12 hours  latanoprost 0.005% Ophthalmic Solution 1 Drop(s) Both EYES at bedtime  levothyroxine 50 MICROGram(s) Oral daily  ondansetron Injectable 4 milliGRAM(s) IV Push three times a day PRN  sodium chloride 0.9%. 1000 milliLiter(s) IV Continuous <Continuous>  sotalol 80 milliGRAM(s) Oral two times a day  timolol 0.5% Solution 1 Drop(s) Both EYES two times a day    Drug Dosing Weight  Height (cm): 154.94 (16 Jul 2020 17:47)  Weight (kg): 72.7 (16 Jul 2020 23:30)  BMI (kg/m2): 30.3 (16 Jul 2020 23:30)  BSA (m2): 1.72 (16 Jul 2020 23:30)    CENTRAL LINE: [] YES [x] NO  LOCATION:   DATE INSERTED:  REMOVE: [] YES [] NO  EXPLAIN:    ARROYO: [] YES [x] NO    DATE INSERTED:  REMOVE:  [] YES [] NO  EXPLAIN: Strict IOs    A-LINE:  [] YES [x] NO  LOCATION:   DATE INSERTED:  REMOVE:  [] YES [] NO  EXPLAIN:    PMH -reviewed admission note, no change since admission  PAST MEDICAL & SURGICAL HISTORY:  HTN (hypertension)  No significant past surgical history      ICU Vital Signs Last 24 Hrs  T(C): 36.4 (19 Jul 2020 00:00), Max: 36.7 (18 Jul 2020 20:00)  T(F): 97.6 (19 Jul 2020 00:00), Max: 98 (18 Jul 2020 20:00)  HR: 68 (19 Jul 2020 00:00) (55 - 84)  BP: 159/79 (19 Jul 2020 00:00) (87/45 - 159/79)  BP(mean): 98 (19 Jul 2020 00:00) (55 - 98)  ABP: --  ABP(mean): --  RR: 24 (19 Jul 2020 00:00) (13 - 25)  SpO2: 98% (19 Jul 2020 00:00) (96% - 100%)            07-17 @ 07:01  -  07-18 @ 07:00  --------------------------------------------------------  IN: 3010 mL / OUT: 1750 mL / NET: 1260 mL            PHYSICAL EXAM:    GENERAL: NAD, well-groomed, obese  HEAD:  Atraumatic, Normocephalic  EYES: EOMI, PERRLA, conjunctiva and sclera clear  ENMT: No tonsillar erythema, exudates, or enlargement; Moist mucous membranes, Good dentition, No lesions  NECK: Supple, normal appearance, No JVD; Normal thyroid; Trachea midline  NERVOUS SYSTEM:  Alert & Oriented X3, moving all 4 extremities  CHEST/LUNG: No chest deformity; Normal percussion bilaterally; No rales, rhonchi, wheezing   HEART: Regular rate and rhythm; No murmurs, rubs, or gallops  ABDOMEN: Soft, Nontender, Nondistended; Bowel sounds decreased but present  EXTREMITIES:  2+ Peripheral Pulses, No clubbing, cyanosis, or edema  LYMPH: No lymphadenopathy noted  SKIN: No rashes or lesions; Good capillary refill      LABS:  CBC Full  -  ( 18 Jul 2020 06:25 )  WBC Count : 6.65 K/uL  RBC Count : 3.81 M/uL  Hemoglobin : 11.0 g/dL  Hematocrit : 31.8 %  Platelet Count - Automated : 237 K/uL  Mean Cell Volume : 83.5 fl  Mean Cell Hemoglobin : 28.9 pg  Mean Cell Hemoglobin Concentration : 34.6 gm/dL  Auto Neutrophil # : 3.28 K/uL  Auto Lymphocyte # : 2.25 K/uL  Auto Monocyte # : 0.92 K/uL  Auto Eosinophil # : 0.13 K/uL  Auto Basophil # : 0.03 K/uL  Auto Neutrophil % : 49.3 %  Auto Lymphocyte % : 33.8 %  Auto Monocyte % : 13.8 %  Auto Eosinophil % : 2.0 %  Auto Basophil % : 0.5 %    07-18    125<L>  |  93<L>  |  15  ----------------------------<  95  4.4   |  25  |  0.73    Ca    8.3<L>      18 Jul 2020 21:34  Phos  2.0     07-18  Mg     2.3     07-18    TPro  6.5  /  Alb  3.1<L>  /  TBili  0.3  /  DBili  x   /  AST  43<H>  /  ALT  37  /  AlkPhos  30<L>  07-18        Culture Results:   <10,000 CFU/mL Normal Urogenital Jackie (07-17 @ 03:32)      RADIOLOGY & ADDITIONAL STUDIES REVIEWED:  ***    [ ]GOALS OF CARE DISCUSSION WITH PATIENT/FAMILY/PROXY:    CRITICAL CARE TIME SPENT: 35 minutes    Assessment and Plan:   · Assessment		  Patient is being admitted to ICU with hyponatremia with serum sodium of 114 mg/dl likely secondary to SIADH due to vomiting. s/p 500 ml IV fluids in ED. Urine lytes shows dilute urine. serum osmolality sent. Will repeat BMP. Nephrology consult dr willard.    Assessment:  1. 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  2. Rule out intra-abdominal pathology for vomiting.  3. Hyperlipidemia  4. Hypertension.      Plan:    CNS:  pt has "memory problem" as per daughter  Patient is alert oriented x 3 now  pt had n/v, headache but resolved now  Monitor for any seizures, convulsion, or change in mental status.      CVS;  #Hypertension/hypotension  takes losartan 50mg-hctz 12.5mg and amlodipine 2.5mg at home.  Started on phenylephrine for hypotension, now it's DCd  will hold amlodipine and hydrochlorothiazide.  monitor blood pressure.    #History of hyperlipidemia.  continue simvastatin  lipid panel WNL    # Possible hx of Afib  EKG showed normal sinus rhthym , no ischemic changes.  Patient is on Eliquis for 'heart problem' per family  c/w sotalol and Eliquis    Respiratory:  No active issues.  patient is saturating 100 % on room air.      GI:  - starting regular diet with fluid restriction    Nephrology:  # Hyponatremia 2/2 renal loss from HCTZ vs SIADH  - p/w serum sodium of 114, likely secondary  SIADH due to vomiting vs medication induced (HCTZ can cause increased urine sodium).  - Uosm 317, Kinga 77, blood osm 239 (low).   - pt was given NS 60cc/h and 2g Nacl tab  - Na 115 (7/16 7pm) ->114 (9pm) ->124 (7/17 4am) ->127 (8am)-> 132 ->132->129(7/19 6am)->125->125(9:30pm)  - correcting too fast, s/p D5W 150cc/h, now Na is low again, started on NS 70cc/hr, which was increased to 80cc/h overnight  - f/u BMP in AM   Nephrology  Dr willard.    #hypokalemia and hypophosphoremia  replaced.  will hold HCTZ      Heam/onc:  No issues  no active signs of bleeding and no baseline to compare.  will monitor CBC.    ID:  No active issues.  CT scan of abdomen : no acute infection or pathology, showed liver cysts, diverticulosis, severe joint arthrosis L5-S1    DVT prophylaxis:  Heparin sc for DVT prophylaxis.  Dcd Iv protonix as pt was started on diet    Dispo:  Downgrade to medicine once Na achieved the goal (126-131 on 7/19 4am)

## 2020-07-19 NOTE — PROGRESS NOTE ADULT - SUBJECTIVE AND OBJECTIVE BOX
INTERVAL HPI/OVERNIGHT EVENTS:     - pt's Na noted 125 at 10pm, same as the last result. NS rate was increased to 80cc/hr  - Pt was planned to be downgraded, but we decided to monitor the pt in ICU overnight. Pt's daughter was notified.      Antimicrobial:    Cardiovascular:  sotalol 80 milliGRAM(s) Oral two times a day    Pulmonary:    Hematalogic:  apixaban 5 milliGRAM(s) Oral every 12 hours    Other:  latanoprost 0.005% Ophthalmic Solution 1 Drop(s) Both EYES at bedtime  levothyroxine 50 MICROGram(s) Oral daily  ondansetron Injectable 4 milliGRAM(s) IV Push three times a day PRN  sodium chloride 0.9%. 1000 milliLiter(s) IV Continuous <Continuous>  timolol 0.5% Solution 1 Drop(s) Both EYES two times a day    apixaban 5 milliGRAM(s) Oral every 12 hours  latanoprost 0.005% Ophthalmic Solution 1 Drop(s) Both EYES at bedtime  levothyroxine 50 MICROGram(s) Oral daily  ondansetron Injectable 4 milliGRAM(s) IV Push three times a day PRN  sodium chloride 0.9%. 1000 milliLiter(s) IV Continuous <Continuous>  sotalol 80 milliGRAM(s) Oral two times a day  timolol 0.5% Solution 1 Drop(s) Both EYES two times a day    Drug Dosing Weight  Height (cm): 154.94 (16 Jul 2020 17:47)  Weight (kg): 72.7 (16 Jul 2020 23:30)  BMI (kg/m2): 30.3 (16 Jul 2020 23:30)  BSA (m2): 1.72 (16 Jul 2020 23:30)    CENTRAL LINE: [] YES [x] NO  LOCATION:   DATE INSERTED:  REMOVE: [] YES [] NO  EXPLAIN:    ARROYO: [] YES [x] NO    DATE INSERTED:  REMOVE:  [] YES [] NO  EXPLAIN: Strict IOs    A-LINE:  [] YES [x] NO  LOCATION:   DATE INSERTED:  REMOVE:  [] YES [] NO  EXPLAIN:    PMH -reviewed admission note, no change since admission  PAST MEDICAL & SURGICAL HISTORY:  HTN (hypertension)  No significant past surgical history      ICU Vital Signs Last 24 Hrs  T(C): 36.4 (19 Jul 2020 00:00), Max: 36.7 (18 Jul 2020 20:00)  T(F): 97.6 (19 Jul 2020 00:00), Max: 98 (18 Jul 2020 20:00)  HR: 68 (19 Jul 2020 00:00) (55 - 84)  BP: 159/79 (19 Jul 2020 00:00) (87/45 - 159/79)  BP(mean): 98 (19 Jul 2020 00:00) (55 - 98)  ABP: --  ABP(mean): --  RR: 24 (19 Jul 2020 00:00) (13 - 25)  SpO2: 98% (19 Jul 2020 00:00) (96% - 100%)            07-17 @ 07:01  -  07-18 @ 07:00  --------------------------------------------------------  IN: 3010 mL / OUT: 1750 mL / NET: 1260 mL            PHYSICAL EXAM:    GENERAL: NAD, well-groomed, obese  HEAD:  Atraumatic, Normocephalic  EYES: EOMI, PERRLA, conjunctiva and sclera clear  ENMT: No tonsillar erythema, exudates, or enlargement; Moist mucous membranes, Good dentition, No lesions  NECK: Supple, normal appearance, No JVD; Normal thyroid; Trachea midline  NERVOUS SYSTEM:  Alert & Oriented X3, moving all 4 extremities  CHEST/LUNG: No chest deformity; Normal percussion bilaterally; No rales, rhonchi, wheezing   HEART: Regular rate and rhythm; No murmurs, rubs, or gallops  ABDOMEN: Soft, Nontender, Nondistended; Bowel sounds decreased but present  EXTREMITIES:  2+ Peripheral Pulses, No clubbing, cyanosis, or edema  LYMPH: No lymphadenopathy noted  SKIN: No rashes or lesions; Good capillary refill      LABS:  CBC Full  -  ( 18 Jul 2020 06:25 )  WBC Count : 6.65 K/uL  RBC Count : 3.81 M/uL  Hemoglobin : 11.0 g/dL  Hematocrit : 31.8 %  Platelet Count - Automated : 237 K/uL  Mean Cell Volume : 83.5 fl  Mean Cell Hemoglobin : 28.9 pg  Mean Cell Hemoglobin Concentration : 34.6 gm/dL  Auto Neutrophil # : 3.28 K/uL  Auto Lymphocyte # : 2.25 K/uL  Auto Monocyte # : 0.92 K/uL  Auto Eosinophil # : 0.13 K/uL  Auto Basophil # : 0.03 K/uL  Auto Neutrophil % : 49.3 %  Auto Lymphocyte % : 33.8 %  Auto Monocyte % : 13.8 %  Auto Eosinophil % : 2.0 %  Auto Basophil % : 0.5 %    07-18    125<L>  |  93<L>  |  15  ----------------------------<  95  4.4   |  25  |  0.73    Ca    8.3<L>      18 Jul 2020 21:34  Phos  2.0     07-18  Mg     2.3     07-18    TPro  6.5  /  Alb  3.1<L>  /  TBili  0.3  /  DBili  x   /  AST  43<H>  /  ALT  37  /  AlkPhos  30<L>  07-18        Culture Results:   <10,000 CFU/mL Normal Urogenital Jackie (07-17 @ 03:32)      RADIOLOGY & ADDITIONAL STUDIES REVIEWED:  ***    [ ]GOALS OF CARE DISCUSSION WITH PATIENT/FAMILY/PROXY:    CRITICAL CARE TIME SPENT: 35 minutes INTERVAL HPI/OVERNIGHT EVENTS:     - pt's Na noted 125 at 10pm, same as the last result. NS rate was increased to 80cc/hr  - Pt was planned to be downgraded, but we decided to monitor the pt in ICU overnight. Pt's daughter was notified. Na came back 131 in am labs and pt will be downgraded to medicine floors on salt tabs.       Antimicrobial:    Cardiovascular:  sotalol 80 milliGRAM(s) Oral two times a day    Pulmonary:    Hematalogic:  apixaban 5 milliGRAM(s) Oral every 12 hours    Other:  latanoprost 0.005% Ophthalmic Solution 1 Drop(s) Both EYES at bedtime  levothyroxine 50 MICROGram(s) Oral daily  ondansetron Injectable 4 milliGRAM(s) IV Push three times a day PRN  sodium chloride 0.9%. 1000 milliLiter(s) IV Continuous <Continuous>  timolol 0.5% Solution 1 Drop(s) Both EYES two times a day    apixaban 5 milliGRAM(s) Oral every 12 hours  latanoprost 0.005% Ophthalmic Solution 1 Drop(s) Both EYES at bedtime  levothyroxine 50 MICROGram(s) Oral daily  ondansetron Injectable 4 milliGRAM(s) IV Push three times a day PRN  sodium chloride 0.9%. 1000 milliLiter(s) IV Continuous <Continuous>  sotalol 80 milliGRAM(s) Oral two times a day  timolol 0.5% Solution 1 Drop(s) Both EYES two times a day    Drug Dosing Weight  Height (cm): 154.94 (16 Jul 2020 17:47)  Weight (kg): 72.7 (16 Jul 2020 23:30)  BMI (kg/m2): 30.3 (16 Jul 2020 23:30)  BSA (m2): 1.72 (16 Jul 2020 23:30)    CENTRAL LINE: [] YES [x] NO  LOCATION:   DATE INSERTED:  REMOVE: [] YES [] NO  EXPLAIN:    ARROYO: [] YES [x] NO    DATE INSERTED:  REMOVE:  [] YES [] NO  EXPLAIN: Strict IOs    A-LINE:  [] YES [x] NO  LOCATION:   DATE INSERTED:  REMOVE:  [] YES [] NO  EXPLAIN:    PMH -reviewed admission note, no change since admission  PAST MEDICAL & SURGICAL HISTORY:  HTN (hypertension)  No significant past surgical history      ICU Vital Signs Last 24 Hrs  T(C): 36.4 (19 Jul 2020 00:00), Max: 36.7 (18 Jul 2020 20:00)  T(F): 97.6 (19 Jul 2020 00:00), Max: 98 (18 Jul 2020 20:00)  HR: 68 (19 Jul 2020 00:00) (55 - 84)  BP: 159/79 (19 Jul 2020 00:00) (87/45 - 159/79)  BP(mean): 98 (19 Jul 2020 00:00) (55 - 98)  ABP: --  ABP(mean): --  RR: 24 (19 Jul 2020 00:00) (13 - 25)  SpO2: 98% (19 Jul 2020 00:00) (96% - 100%)            07-17 @ 07:01  -  07-18 @ 07:00  --------------------------------------------------------  IN: 3010 mL / OUT: 1750 mL / NET: 1260 mL            PHYSICAL EXAM:    GENERAL: NAD, well-groomed, obese  HEAD:  Atraumatic, Normocephalic  EYES: EOMI, PERRLA, conjunctiva and sclera clear  ENMT: No tonsillar erythema, exudates, or enlargement; Moist mucous membranes, Good dentition, No lesions  NECK: Supple, normal appearance, No JVD; Normal thyroid; Trachea midline  NERVOUS SYSTEM:  Alert & Oriented X3, moving all 4 extremities  CHEST/LUNG: No chest deformity; Normal percussion bilaterally; No rales, rhonchi, wheezing   HEART: Regular rate and rhythm; No murmurs, rubs, or gallops  ABDOMEN: Soft, Nontender, Nondistended; Bowel sounds decreased but present  EXTREMITIES:  2+ Peripheral Pulses, No clubbing, cyanosis, or edema  LYMPH: No lymphadenopathy noted  SKIN: No rashes or lesions; Good capillary refill      LABS:  CBC Full  -  ( 18 Jul 2020 06:25 )  WBC Count : 6.65 K/uL  RBC Count : 3.81 M/uL  Hemoglobin : 11.0 g/dL  Hematocrit : 31.8 %  Platelet Count - Automated : 237 K/uL  Mean Cell Volume : 83.5 fl  Mean Cell Hemoglobin : 28.9 pg  Mean Cell Hemoglobin Concentration : 34.6 gm/dL  Auto Neutrophil # : 3.28 K/uL  Auto Lymphocyte # : 2.25 K/uL  Auto Monocyte # : 0.92 K/uL  Auto Eosinophil # : 0.13 K/uL  Auto Basophil # : 0.03 K/uL  Auto Neutrophil % : 49.3 %  Auto Lymphocyte % : 33.8 %  Auto Monocyte % : 13.8 %  Auto Eosinophil % : 2.0 %  Auto Basophil % : 0.5 %    07-18    125<L>  |  93<L>  |  15  ----------------------------<  95  4.4   |  25  |  0.73    Ca    8.3<L>      18 Jul 2020 21:34  Phos  2.0     07-18  Mg     2.3     07-18    TPro  6.5  /  Alb  3.1<L>  /  TBili  0.3  /  DBili  x   /  AST  43<H>  /  ALT  37  /  AlkPhos  30<L>  07-18        Culture Results:   <10,000 CFU/mL Normal Urogenital Jackie (07-17 @ 03:32)      RADIOLOGY & ADDITIONAL STUDIES REVIEWED:  ***    [ ]GOALS OF CARE DISCUSSION WITH PATIENT/FAMILY/PROXY:    CRITICAL CARE TIME SPENT: 35 minutes

## 2020-07-20 DIAGNOSIS — I10 ESSENTIAL (PRIMARY) HYPERTENSION: ICD-10-CM

## 2020-07-20 DIAGNOSIS — E87.1 HYPO-OSMOLALITY AND HYPONATREMIA: ICD-10-CM

## 2020-07-20 DIAGNOSIS — E78.5 HYPERLIPIDEMIA, UNSPECIFIED: ICD-10-CM

## 2020-07-20 DIAGNOSIS — I48.91 UNSPECIFIED ATRIAL FIBRILLATION: ICD-10-CM

## 2020-07-20 LAB
ALBUMIN SERPL ELPH-MCNC: 3 G/DL — LOW (ref 3.5–5)
ALP SERPL-CCNC: 31 U/L — LOW (ref 40–120)
ALT FLD-CCNC: 38 U/L DA — SIGNIFICANT CHANGE UP (ref 10–60)
ANION GAP SERPL CALC-SCNC: 5 MMOL/L — SIGNIFICANT CHANGE UP (ref 5–17)
AST SERPL-CCNC: 25 U/L — SIGNIFICANT CHANGE UP (ref 10–40)
BASOPHILS # BLD AUTO: 0.03 K/UL — SIGNIFICANT CHANGE UP (ref 0–0.2)
BASOPHILS NFR BLD AUTO: 0.5 % — SIGNIFICANT CHANGE UP (ref 0–2)
BILIRUB SERPL-MCNC: 0.3 MG/DL — SIGNIFICANT CHANGE UP (ref 0.2–1.2)
BUN SERPL-MCNC: 16 MG/DL — SIGNIFICANT CHANGE UP (ref 7–18)
CALCIUM SERPL-MCNC: 8.8 MG/DL — SIGNIFICANT CHANGE UP (ref 8.4–10.5)
CHLORIDE SERPL-SCNC: 102 MMOL/L — SIGNIFICANT CHANGE UP (ref 96–108)
CO2 SERPL-SCNC: 30 MMOL/L — SIGNIFICANT CHANGE UP (ref 22–31)
CREAT SERPL-MCNC: 0.84 MG/DL — SIGNIFICANT CHANGE UP (ref 0.5–1.3)
EOSINOPHIL # BLD AUTO: 0.19 K/UL — SIGNIFICANT CHANGE UP (ref 0–0.5)
EOSINOPHIL NFR BLD AUTO: 3 % — SIGNIFICANT CHANGE UP (ref 0–6)
GLUCOSE SERPL-MCNC: 77 MG/DL — SIGNIFICANT CHANGE UP (ref 70–99)
HCT VFR BLD CALC: 32.2 % — LOW (ref 34.5–45)
HGB BLD-MCNC: 10.4 G/DL — LOW (ref 11.5–15.5)
IMM GRANULOCYTES NFR BLD AUTO: 0.5 % — SIGNIFICANT CHANGE UP (ref 0–1.5)
LYMPHOCYTES # BLD AUTO: 2.42 K/UL — SIGNIFICANT CHANGE UP (ref 1–3.3)
LYMPHOCYTES # BLD AUTO: 38.4 % — SIGNIFICANT CHANGE UP (ref 13–44)
MAGNESIUM SERPL-MCNC: 2.5 MG/DL — SIGNIFICANT CHANGE UP (ref 1.6–2.6)
MCHC RBC-ENTMCNC: 28.5 PG — SIGNIFICANT CHANGE UP (ref 27–34)
MCHC RBC-ENTMCNC: 32.3 GM/DL — SIGNIFICANT CHANGE UP (ref 32–36)
MCV RBC AUTO: 88.2 FL — SIGNIFICANT CHANGE UP (ref 80–100)
MONOCYTES # BLD AUTO: 0.81 K/UL — SIGNIFICANT CHANGE UP (ref 0–0.9)
MONOCYTES NFR BLD AUTO: 12.8 % — SIGNIFICANT CHANGE UP (ref 2–14)
NEUTROPHILS # BLD AUTO: 2.83 K/UL — SIGNIFICANT CHANGE UP (ref 1.8–7.4)
NEUTROPHILS NFR BLD AUTO: 44.8 % — SIGNIFICANT CHANGE UP (ref 43–77)
NRBC # BLD: 0 /100 WBCS — SIGNIFICANT CHANGE UP (ref 0–0)
PHOSPHATE SERPL-MCNC: 3.3 MG/DL — SIGNIFICANT CHANGE UP (ref 2.5–4.5)
PLATELET # BLD AUTO: 272 K/UL — SIGNIFICANT CHANGE UP (ref 150–400)
POTASSIUM SERPL-MCNC: 4.6 MMOL/L — SIGNIFICANT CHANGE UP (ref 3.5–5.3)
POTASSIUM SERPL-SCNC: 4.6 MMOL/L — SIGNIFICANT CHANGE UP (ref 3.5–5.3)
PROT SERPL-MCNC: 6.6 G/DL — SIGNIFICANT CHANGE UP (ref 6–8.3)
RBC # BLD: 3.65 M/UL — LOW (ref 3.8–5.2)
RBC # FLD: 13.8 % — SIGNIFICANT CHANGE UP (ref 10.3–14.5)
SODIUM SERPL-SCNC: 137 MMOL/L — SIGNIFICANT CHANGE UP (ref 135–145)
WBC # BLD: 6.31 K/UL — SIGNIFICANT CHANGE UP (ref 3.8–10.5)
WBC # FLD AUTO: 6.31 K/UL — SIGNIFICANT CHANGE UP (ref 3.8–10.5)

## 2020-07-20 RX ORDER — POLYETHYLENE GLYCOL 3350 17 G/17G
17 POWDER, FOR SOLUTION ORAL DAILY
Refills: 0 | Status: DISCONTINUED | OUTPATIENT
Start: 2020-07-20 | End: 2020-07-21

## 2020-07-20 RX ADMIN — POLYETHYLENE GLYCOL 3350 17 GRAM(S): 17 POWDER, FOR SOLUTION ORAL at 17:20

## 2020-07-20 RX ADMIN — LATANOPROST 1 DROP(S): 0.05 SOLUTION/ DROPS OPHTHALMIC; TOPICAL at 21:24

## 2020-07-20 RX ADMIN — SENNA PLUS 2 TABLET(S): 8.6 TABLET ORAL at 21:24

## 2020-07-20 RX ADMIN — Medication 80 MILLIGRAM(S): at 05:48

## 2020-07-20 RX ADMIN — Medication 5 MILLIGRAM(S): at 09:47

## 2020-07-20 RX ADMIN — SODIUM CHLORIDE 1 GRAM(S): 9 INJECTION INTRAMUSCULAR; INTRAVENOUS; SUBCUTANEOUS at 05:48

## 2020-07-20 RX ADMIN — SODIUM CHLORIDE 1 GRAM(S): 9 INJECTION INTRAMUSCULAR; INTRAVENOUS; SUBCUTANEOUS at 14:46

## 2020-07-20 RX ADMIN — APIXABAN 5 MILLIGRAM(S): 2.5 TABLET, FILM COATED ORAL at 17:19

## 2020-07-20 RX ADMIN — Medication 1 DROP(S): at 05:49

## 2020-07-20 RX ADMIN — APIXABAN 5 MILLIGRAM(S): 2.5 TABLET, FILM COATED ORAL at 05:49

## 2020-07-20 RX ADMIN — Medication 1 DROP(S): at 17:20

## 2020-07-20 RX ADMIN — Medication 50 MICROGRAM(S): at 05:48

## 2020-07-20 NOTE — PROGRESS NOTE ADULT - ASSESSMENT
78 Y/o Female, Turkish speaking, from home with PMH of HTN and HLP. Admitted for Hyponatremia. Hyponatremia was controlled on the ICU, Na is 137 on 7/20, and patient is now downgraded to floors.

## 2020-07-20 NOTE — PROGRESS NOTE ADULT - PROBLEM SELECTOR PLAN 1
- p/w serum sodium of 114, likely secondary  SIADH due to vomiting vs medication induced (HCTZ can cause increased urine sodium).  - Uosm 317, Kinga 77, blood osm 239 (low).   - pt was given NS 60cc/h and 2g Nacl tab  - Na 115 (7/16 7pm) ->114 (9pm) ->124 (7/17 4am) ->127 (8am)-> 132 ->132->129(7/19 6am)->125->125(9:30pm)-> 137 (7/20 05:39)  - f/u BMP in AM   - Nephrology  Dr willard.

## 2020-07-20 NOTE — PROGRESS NOTE ADULT - PROBLEM SELECTOR PLAN 2
- Possible history of Afib  - EKG showed normal sinus rhthym , no ischemic changes.  - Patient is on Eliquis for 'heart problem' per family  - c/w sotalol and Eliquis

## 2020-07-20 NOTE — PROGRESS NOTE ADULT - SUBJECTIVE AND OBJECTIVE BOX
[facility] NEPHROLOGY- PROGRESS NOTE    Patient is a 78yo Spanish speaking Female with HTN on Losartan/ HCTZ, HLD p/w n/v x 2 days. Pt found to have serum Na 115. Nephrology consulted for hyponatremia    Hospital Medications: Medications reviewed.  REVIEW OF SYSTEMS:   CONSTITUTIONAL: No fevers or chills  RESPIRATORY: No shortness of breath  CARDIOVASCULAR: No chest pain.  GASTROINTESTINAL: No nausea, vomiting, diarrhea or abdominal pain. +constipation  VASCULAR: No bilateral lower extremity edema.     VITALS:  T(F): 98.3 (20 @ 05:41), Max: 98.5 (20 @ 21:54)  HR: 67 (20 @ 11:11)  BP: 107/67 (20 @ 11:11)  RR: 18 (20 @ 05:41)  SpO2: 99% (20 @ 11:11)  Wt(kg): --     @ 07:01  -   @ 07:00  --------------------------------------------------------  IN: 340 mL / OUT: 600 mL / NET: -260 mL        PHYSICAL EXAM:  Constitutional: NAD  Neurological: Awake and Alert  HEENT: anicteric sclera,   Respiratory: CTAB, no wheezes, rales or rhonchi  Cardiovascular: S1, S2, RRR  Gastrointestinal: BS+, soft, NT/ND  : No ventura.  Extremities: No peripheral edema    LABS:    137 <--, 134 <--, 133 <--, 131 <--, 125 <--, 125 <--, 131 <--  137  |  102  |  16  ----------------------------<  77  4.6   |  30  |  0.84    Ca    8.8      2020 05:39  Phos  3.3       Mg     2.5         TPro  6.6  /  Alb  3.0<L>  /  TBili  0.3  /  DBili      /  AST  25  /  ALT  38  /  AlkPhos  31<L>      Creatinine Trend: 0.84 <--, 1.01 <--, 0.79 <--, 0.64 <--, 0.73 <--, 0.77 <--, 0.42 <--, 0.65 <--, 0.78 <--, 0.92 <--, 0.73 <--, 0.64 <--, 0.70 <--, 0.68 <--, 0.70 <--                        10.4   6.31  )-----------( 272      ( 2020 05:39 )             32.2     Urine Studies:  Urinalysis Basic - ( 2020 20:15 )    Color: Yellow / Appearance: Clear / S.005 / pH:   Gluc:  / Ketone: Moderate  / Bili: Negative / Urobili: Negative   Blood:  / Protein: Negative / Nitrite: Negative   Leuk Esterase: Negative / RBC: 0-2 /HPF / WBC 0-2 /HPF   Sq Epi:  / Non Sq Epi: Few /HPF / Bacteria: Trace /HPF      Creatinine, Random Urine: <13 mg/dL ( @ 08:45)  Sodium, Random Urine: 26 mmol/L ( @ 08:45)  Osmolality, Random Urine: 102 mos/kg ( @ 08:44)  Sodium, Random Urine: 77 mmol/L ( @ 20:15)  Osmolality, Random Urine: 317 mos/kg ( @ 20:15)  Creatinine, Random Urine: 32 mg/dL ( @ 20:15)

## 2020-07-20 NOTE — PROGRESS NOTE ADULT - ASSESSMENT
Patient is a 80yo Finnish speaking Female with HTN on Losartan/ HCTZ, HLD p/w n/v x 2 days. Pt found to have serum Na 115. Nephrology consulted for hyponatremia    1. Hyponatremia- likely hypovolemic hyponatremia due to n/v and HCTZ use. Continue to hold HCTZ. Serum Na improving; now wnl. Can d/c salt tabs.  Check BMP q12hrs.Monitor serum Na    2. Hypokalemia- resolved. Monitor lytes.     3. Hypophosphatemia- resolved s/prepletion. Monitor serum phos    4. HTN- BP acceptable. c/w Losartan. Continue to hold HCTZ due to hyponatremia (would hold on d/c). Monitor BP    5. Hypocalcemia-resolved. Monitor ionized Ca.

## 2020-07-20 NOTE — DISCHARGE NOTE PROVIDER - NSDCCPCAREPLAN_GEN_ALL_CORE_FT
PRINCIPAL DISCHARGE DIAGNOSIS  Diagnosis: Hyponatremia  Assessment and Plan of Treatment:       SECONDARY DISCHARGE DIAGNOSES  Diagnosis: HLD (hyperlipidemia)  Assessment and Plan of Treatment: HLD (hyperlipidemia)    Diagnosis: HTN (hypertension)  Assessment and Plan of Treatment: HTN (hypertension) PRINCIPAL DISCHARGE DIAGNOSIS  Diagnosis: Hyponatremia  Assessment and Plan of Treatment: Patient presented to the ED with severe nausea and vomiting x 2 days.   Noted to have hyponatremia with serum sodium of 114 mg/dl likely secondary to SIADH due to vomiting vs medication induced. Given 500 ml IV fluids in ED.  Admitted to the ICU for close monitoring of hyponatremia. Given normal saline 60cc/hour and 2g sodium chloride tablets and sodium went up. Sodium went to normal limits and remained stable. Salt tablets were discontinued as per nephrology recommendations. Recommend to stop taking hydrochlorothiazide to prevent any future episodes of low sodium. Follow up with your PCP and repeat basic metabolic panel within 3 days to monitor sodium level.      SECONDARY DISCHARGE DIAGNOSES  Diagnosis: Hypothyroidism  Assessment and Plan of Treatment: Continue your home medication of synthroid. Follow up with your PCP for further management.    Diagnosis: HLD (hyperlipidemia)  Assessment and Plan of Treatment: Continue with your statin. Follow up with your PCP and repeat lipid panel lab test in about 3 months for further evaluation.    Diagnosis: Afib  Assessment and Plan of Treatment: Continue with your Eliquis for anticoagulation. Follow up with your PCP for further evaluation and management.    Diagnosis: HTN (hypertension)  Assessment and Plan of Treatment: Recommended to have a low salt/DASH diet. Continue with blood pressure medications except for hydrochlorothiazide. Follow up with PCP for management of hypertension. Monitor blood pressure daily.

## 2020-07-20 NOTE — DISCHARGE NOTE PROVIDER - HOSPITAL COURSE
Patient is an 79 year old female, with PMH of HTN (on Losartan), and HLD, who presented to the ED with severe nausea and vomiting x 2 days.         Patient was noted to have hyponatremia with serum sodium of 114 mg/dl likely secondary to SIADH due to vomiting vs medication induced. Given 500 ml IV fluids in ED.        Admitted to the ICU for close monitoring of hyponatremia. Patient was given NS 60cc/h and 2g Nacl tab and Na went up. Patient's sodium elevated and remained stable. Started on salt tabs to help improve sodium levels. Patient wasn't  downgraded to medicine floor for further management.         NOTE NOT COMPLETE Patient is an 79 year old female, with PMH of HTN (on Losartan), and HLD, who presented to the ED with severe nausea and vomiting x 2 days.         Patient was noted to have hyponatremia with serum sodium of 114 mg/dl likely secondary to SIADH due to vomiting vs medication induced. Given 500 ml IV fluids in ED.        Admitted to the ICU for close monitoring of hyponatremia. Patient was given NS 60cc/h and 2g Nacl tab and Na went up. Patient's sodium elevated and remained stable. Started on salt tabs to help improve sodium levels. Patient wasn't  downgraded to medicine floor for further management. Sodium levels improved and salt tablets were discontinued.        Patient is medically stable for discharge. Case was discussed with attending.

## 2020-07-20 NOTE — DISCHARGE NOTE PROVIDER - NSDCMRMEDTOKEN_GEN_ALL_CORE_FT
amLODIPine 2.5 mg oral tablet: 1 tab(s) orally once a day  Eliquis 5 mg oral tablet: 1 tab(s) orally 2 times a day  hydroCHLOROthiazide 12.5 mg oral capsule: 1 cap(s) orally once a day  latanoprost 0.005% ophthalmic solution: 1 drop(s) to each affected eye once a day (in the evening)  levothyroxine 50 mcg (0.05 mg) oral tablet: 1 tab(s) orally once a day  losartan 50 mg oral tablet: 1 tab(s) orally once a day  magnesium oxide 140 mg oral capsule: 1 cap(s) orally 3 times a day  nitroglycerin 0.4 mg sublingual tablet: 1 tab(s) sublingual every 5 minutes, As Needed  simvastatin 20 mg oral tablet: 1 tab(s) orally once a day (at bedtime)  sotalol 80 mg oral tablet: 1 tab(s) orally 2 times a day  timolol hemihydrate 0.5% ophthalmic solution: 1 drop(s) to each affected eye 2 times a day amLODIPine 2.5 mg oral tablet: 1 tab(s) orally once a day  Eliquis 5 mg oral tablet: 1 tab(s) orally 2 times a day  latanoprost 0.005% ophthalmic solution: 1 drop(s) to each affected eye once a day (in the evening)  levothyroxine 50 mcg (0.05 mg) oral tablet: 1 tab(s) orally once a day  losartan 50 mg oral tablet: 1 tab(s) orally once a day  magnesium oxide 140 mg oral capsule: 1 cap(s) orally 3 times a day  nitroglycerin 0.4 mg sublingual tablet: 1 tab(s) sublingual every 5 minutes, As Needed  simvastatin 20 mg oral tablet: 1 tab(s) orally once a day (at bedtime)  sotalol 80 mg oral tablet: 1 tab(s) orally 2 times a day  timolol hemihydrate 0.5% ophthalmic solution: 1 drop(s) to each affected eye 2 times a day

## 2020-07-20 NOTE — PROGRESS NOTE ADULT - SUBJECTIVE AND OBJECTIVE BOX
PGY-1 Progress Note discussed with attending    PAGER #: [[phone]] TILL 5:00 PM  PLEASE CONTACT ON CALL TEAM:  - On Call Team (Please refer to David) FROM 5:00 PM - 8:30PM  - Nightfloat Team FROM 8:30 -7:30 AM    CHIEF COMPLAINT & BRIEF HOSPITAL COURSE:  79 years old Emirati speaking female from home with PMHx of HTN (on Losartan), HLP ( Simvastatin), presented to ED with severe nausea and vomiting x 2 days. Patient ate 'simbosa' from outside 2 days ago after that she has been vomiting and not able to hold up anything. Patient admitted to having decreased PO intake because of vomiting. Patient denies having history of low serum sodium level or any recent change in medications. Patient does have some dizziness because of vomiting.  Patient denies any chest pain, shortness of breath, abdominal pain, change in urinary or bowel habits.  Patient was admitted with hyponatremia with serum sodium of 114 mg/dl likely secondary to SIADH due to vomiting vs medication induced (HCTZ can cause increased urine sodium). s/p 500 ml IV fluids in ED.    Pt was admitted to ICU for close monitoring of hyponatremia. Hyponatremia was likely from renal loss due to hydrochlorothiazide vs SIADH due to n/v. Uosm was 317, Kinga was 77, blood osm was 239 (low). pt was given NS 60cc/h and 2g Nacl tab and Na went up to 124, which is too fast and pt was started on D5W@125cc/h. Patients sodium stable. S/p 1 dose DDAVP 2mcg. Patient's Na was 131  (7/19) in am. will start on salt tabs 1 g TID. 7/20 Na is 137.        INTERVAL HPI/OVERNIGHT EVENTS:   Patient seen and examined at bedside using  services, Gayla [id number]. She complains of constipations, mentions not passing a BM in a couple of days, patient on Bisacodyl PRN for constipation, nurse aware of it. She denies any headaches, dizzines, tremors, Chest pain, palpitations, abdominal pain, nausea, vomiting, diarrhea, weakness, or fatigue. Patient is eating well, endorses good appetite, and wanting to be discharged.    PT consult is pending for her before she can be D/C.     REVIEW OF SYSTEMS:  CONSTITUTIONAL: No fever, weight loss, or fatigue  RESPIRATORY: No cough, wheezing, chills or hemoptysis; No shortness of breath  CARDIOVASCULAR: No chest pain, palpitations, dizziness, or leg swelling  GASTROINTESTINAL: + constipation. No abdominal pain. No nausea, vomiting, or hematemesis; No diarrhea . no melena or hematochezia.  GENITOURINARY: No dysuria or hematuria, urinary frequency  MUSCULOSKELETAL: No pain, no Limited ROM  NEUROLOGICAL: No headaches, memory loss, loss of strength, numbness, or tremors  SKIN: No itching, burning, rashes, or lesions     MEDICATIONS  (STANDING):  apixaban 5 milliGRAM(s) Oral every 12 hours  latanoprost 0.005% Ophthalmic Solution 1 Drop(s) Both EYES at bedtime  levothyroxine 50 MICROGram(s) Oral daily  senna 2 Tablet(s) Oral at bedtime  sodium chloride 1 Gram(s) Oral three times a day  sotalol 80 milliGRAM(s) Oral two times a day  timolol 0.5% Solution 1 Drop(s) Both EYES two times a day    MEDICATIONS  (PRN):  bisacodyl 5 milliGRAM(s) Oral every 12 hours PRN Constipation  ondansetron Injectable 4 milliGRAM(s) IV Push three times a day PRN Nausea and/or Vomiting      Vital Signs Last 24 Hrs  T(C): 36.8 (20 Jul 2020 05:41), Max: 36.9 (19 Jul 2020 21:54)  T(F): 98.3 (20 Jul 2020 05:41), Max: 98.5 (19 Jul 2020 21:54)  HR: 65 (20 Jul 2020 05:41) (64 - 69)  BP: 130/64 (20 Jul 2020 05:41) (130/64 - 155/63)  BP(mean): --  RR: 18 (20 Jul 2020 05:41) (17 - 18)  SpO2: 99% (20 Jul 2020 05:41) (98% - 100%)    PHYSICAL EXAMINATION:  GENERAL: NAD  HEAD:  Atraumatic, Normocephalic  EYES:  conjunctiva and sclera clear  NECK: Supple, No JVD, Normal thyroid  CHEST/LUNG: Clear to auscultation. Clear to percussion bilaterally; No rales, rhonchi, wheezing, or rubs  HEART: Regular rate and rhythm; No murmurs, rubs, or gallops  ABDOMEN: Soft, Nontender, Nondistended; Bowel sounds present  NERVOUS SYSTEM:  Alert & Oriented X3,    EXTREMITIES:  2+ Peripheral Pulses, No clubbing, cyanosis, or edema  SKIN: warm dry                          10.4   6.31  )-----------( 272      ( 20 Jul 2020 05:39 )             32.2     07-20    137  |  102  |  16  ----------------------------<  77  4.6   |  30  |  0.84    Ca    8.8      20 Jul 2020 05:39  Phos  3.3     07-20  Mg     2.5     07-20    TPro  6.6  /  Alb  3.0<L>  /  TBili  0.3  /  DBili  x   /  AST  25  /  ALT  38  /  AlkPhos  31<L>  07-20    LIVER FUNCTIONS - ( 20 Jul 2020 05:39 )  Alb: 3.0 g/dL / Pro: 6.6 g/dL / ALK PHOS: 31 U/L / ALT: 38 U/L DA / AST: 25 U/L / GGT: x                 I&O's Summary    19 Jul 2020 07:01  -  20 Jul 2020 07:00  --------------------------------------------------------  IN: 340 mL / OUT: 600 mL / NET: -260 mL          RADIOLOGY & ADDITIONAL TESTS:

## 2020-07-20 NOTE — PROGRESS NOTE ADULT - PROBLEM SELECTOR PLAN 3
- takes losartan 50mg-hctz 12.5mg and amlodipine 2.5mg at home.  - will hold amlodipine and hydrochlorothiazide.  - monitor blood pressure.

## 2020-07-21 VITALS
SYSTOLIC BLOOD PRESSURE: 147 MMHG | OXYGEN SATURATION: 97 % | TEMPERATURE: 98 F | DIASTOLIC BLOOD PRESSURE: 77 MMHG | HEART RATE: 65 BPM | RESPIRATION RATE: 18 BRPM

## 2020-07-21 LAB
ANION GAP SERPL CALC-SCNC: 7 MMOL/L — SIGNIFICANT CHANGE UP (ref 5–17)
BUN SERPL-MCNC: 16 MG/DL — SIGNIFICANT CHANGE UP (ref 7–18)
CALCIUM SERPL-MCNC: 9 MG/DL — SIGNIFICANT CHANGE UP (ref 8.4–10.5)
CHLORIDE SERPL-SCNC: 102 MMOL/L — SIGNIFICANT CHANGE UP (ref 96–108)
CO2 SERPL-SCNC: 28 MMOL/L — SIGNIFICANT CHANGE UP (ref 22–31)
CREAT SERPL-MCNC: 0.81 MG/DL — SIGNIFICANT CHANGE UP (ref 0.5–1.3)
GLUCOSE SERPL-MCNC: 87 MG/DL — SIGNIFICANT CHANGE UP (ref 70–99)
HCT VFR BLD CALC: 33.8 % — LOW (ref 34.5–45)
HGB BLD-MCNC: 11 G/DL — LOW (ref 11.5–15.5)
MAGNESIUM SERPL-MCNC: 2.5 MG/DL — SIGNIFICANT CHANGE UP (ref 1.6–2.6)
MCHC RBC-ENTMCNC: 28.9 PG — SIGNIFICANT CHANGE UP (ref 27–34)
MCHC RBC-ENTMCNC: 32.5 GM/DL — SIGNIFICANT CHANGE UP (ref 32–36)
MCV RBC AUTO: 88.7 FL — SIGNIFICANT CHANGE UP (ref 80–100)
NRBC # BLD: 0 /100 WBCS — SIGNIFICANT CHANGE UP (ref 0–0)
PHOSPHATE SERPL-MCNC: 4 MG/DL — SIGNIFICANT CHANGE UP (ref 2.5–4.5)
PLATELET # BLD AUTO: 274 K/UL — SIGNIFICANT CHANGE UP (ref 150–400)
POTASSIUM SERPL-MCNC: 5.2 MMOL/L — SIGNIFICANT CHANGE UP (ref 3.5–5.3)
POTASSIUM SERPL-SCNC: 5.2 MMOL/L — SIGNIFICANT CHANGE UP (ref 3.5–5.3)
RBC # BLD: 3.81 M/UL — SIGNIFICANT CHANGE UP (ref 3.8–5.2)
RBC # FLD: 13.8 % — SIGNIFICANT CHANGE UP (ref 10.3–14.5)
SODIUM SERPL-SCNC: 137 MMOL/L — SIGNIFICANT CHANGE UP (ref 135–145)
WBC # BLD: 5.37 K/UL — SIGNIFICANT CHANGE UP (ref 3.8–10.5)
WBC # FLD AUTO: 5.37 K/UL — SIGNIFICANT CHANGE UP (ref 3.8–10.5)

## 2020-07-21 PROCEDURE — 83690 ASSAY OF LIPASE: CPT

## 2020-07-21 PROCEDURE — 83735 ASSAY OF MAGNESIUM: CPT

## 2020-07-21 PROCEDURE — 74177 CT ABD & PELVIS W/CONTRAST: CPT

## 2020-07-21 PROCEDURE — 36415 COLL VENOUS BLD VENIPUNCTURE: CPT

## 2020-07-21 PROCEDURE — 84484 ASSAY OF TROPONIN QUANT: CPT

## 2020-07-21 PROCEDURE — 83036 HEMOGLOBIN GLYCOSYLATED A1C: CPT

## 2020-07-21 PROCEDURE — 82607 VITAMIN B-12: CPT

## 2020-07-21 PROCEDURE — 84300 ASSAY OF URINE SODIUM: CPT

## 2020-07-21 PROCEDURE — 93005 ELECTROCARDIOGRAM TRACING: CPT

## 2020-07-21 PROCEDURE — 87086 URINE CULTURE/COLONY COUNT: CPT

## 2020-07-21 PROCEDURE — 82570 ASSAY OF URINE CREATININE: CPT

## 2020-07-21 PROCEDURE — 80053 COMPREHEN METABOLIC PANEL: CPT

## 2020-07-21 PROCEDURE — 84100 ASSAY OF PHOSPHORUS: CPT

## 2020-07-21 PROCEDURE — 80061 LIPID PANEL: CPT

## 2020-07-21 PROCEDURE — 82962 GLUCOSE BLOOD TEST: CPT

## 2020-07-21 PROCEDURE — 71045 X-RAY EXAM CHEST 1 VIEW: CPT

## 2020-07-21 PROCEDURE — 85027 COMPLETE CBC AUTOMATED: CPT

## 2020-07-21 PROCEDURE — 83930 ASSAY OF BLOOD OSMOLALITY: CPT

## 2020-07-21 PROCEDURE — 99291 CRITICAL CARE FIRST HOUR: CPT | Mod: 25

## 2020-07-21 PROCEDURE — 86769 SARS-COV-2 COVID-19 ANTIBODY: CPT

## 2020-07-21 PROCEDURE — 96374 THER/PROPH/DIAG INJ IV PUSH: CPT

## 2020-07-21 PROCEDURE — 87635 SARS-COV-2 COVID-19 AMP PRB: CPT

## 2020-07-21 PROCEDURE — 84443 ASSAY THYROID STIM HORMONE: CPT

## 2020-07-21 PROCEDURE — 81001 URINALYSIS AUTO W/SCOPE: CPT

## 2020-07-21 PROCEDURE — 97161 PT EVAL LOW COMPLEX 20 MIN: CPT

## 2020-07-21 PROCEDURE — 84550 ASSAY OF BLOOD/URIC ACID: CPT

## 2020-07-21 PROCEDURE — 83935 ASSAY OF URINE OSMOLALITY: CPT

## 2020-07-21 PROCEDURE — 80048 BASIC METABOLIC PNL TOTAL CA: CPT

## 2020-07-21 RX ADMIN — Medication 1 DROP(S): at 05:27

## 2020-07-21 RX ADMIN — Medication 50 MICROGRAM(S): at 05:26

## 2020-07-21 RX ADMIN — APIXABAN 5 MILLIGRAM(S): 2.5 TABLET, FILM COATED ORAL at 05:27

## 2020-07-21 RX ADMIN — Medication 80 MILLIGRAM(S): at 05:26

## 2020-07-21 NOTE — DISCHARGE NOTE NURSING/CASE MANAGEMENT/SOCIAL WORK - PATIENT PORTAL LINK FT
You can access the FollowMyHealth Patient Portal offered by [facility] by registering at the following website: [email or web address]. By joining [facility]’s FollowMyHealth portal, you will also be able to view your health information using other applications (apps) compatible with our system.

## 2020-07-21 NOTE — PROGRESS NOTE ADULT - ATTENDING COMMENTS
[facility] NEPHROLOGY  Manuel Clay M.D.  Mark Slaughter D.O.  Marj Benoit M.D.  Shamika Lopez, MSN, ANP-C  [phone]    [address] [place] [address]
[facility] NEPHROLOGY  Manuel Clay M.D.  Mark Slaughter D.O.  Marj Benoit M.D.  Shamika Lopez, MSN, ANP-C  [phone]    [address] [place] [address]
[facility] NEPHROLOGY  Manuel Clay M.D.  Mark Slaughter D.O.  Marj Benoit M.D.  Shamika Lopez, MSN, ANP-C  [phone]    [address] [place] [address]
[facility] NEPHROLOGY  Manuel Clay M.D.  Mark Slaughter D.O.  Marj Benoit M.D.  Shamika Lopez, MSN, ANP-C  [phone]    [address] [place] [address]
Patient is being admitted to ICU for  hyponatremia with serum sodium of 114 mg/dl likely secondary to SIADH due to vomiting. s/p 500 ml IV fluids in ED. Urine lytes shows dilute urine. serum osmolality sent. Will repeat BMP.     Assessment:  -  Hypovolemia secondary HCTZ  -  Hyperlipidemia  -  Hypertension.  -  Gastroenteritis      Plan:  -monitor BMP q4h  -neph f/u noted  -hold HCTZ  -npo  -goal of Na correction 0.5 mEq/ hr  -avoid over correction .
Patient is being admitted to ICU for  hyponatremia with serum sodium of 114 mg/dl likely secondary to SIADH due to vomiting. s/p 500 ml IV fluids in ED. Urine lytes shows dilute urine. serum osmolality sent. Will repeat BMP.     Assessment:  -  Hypovolemia secondary HCTZ  -  Hyperlipidemia  -  Hypertension.  -  Gastroenteritis      Plan:  -monitor BMP q4h  -neph f/u noted  -hold HCTZ  -npo  -goal of Na correction 0.5 mEq/ hr  -avoid over correction
Patient is being admitted to ICU for  hyponatremia with serum sodium of 114 mg/dl likely secondary to SIADH due to vomiting. s/p 500 ml IV fluids in ED. Urine lytes shows dilute urine. serum osmolality sent. Will repeat BMP.     Assessment:  -  Hypovolemia secondary HCTZ  -  Hyperlipidemia  -  Hypertension.  -  Gastroenteritis      Plan:  -monitor BMP q4h  -neph f/u noted  -hold HCTZ  -npo  -goal of Na correction 0.5 mEq/ hr  -avoid over correction .

## 2020-07-21 NOTE — PROGRESS NOTE ADULT - ASSESSMENT
Patient is a 80yo Indian speaking Female with HTN on Losartan/ HCTZ, HLD p/w n/v x 2 days. Pt found to have serum Na 115. Nephrology consulted for hyponatremia    1. Hyponatremia- likely hypovolemic hyponatremia due to n/v and HCTZ use. Continue to hold HCTZ (d/c off HCTZ) Serum Na wnl. Serum Na stable off salt tabs (d/c 7/20). Monitor serum Na    2. Hypokalemia- resolved. Monitor lytes.     3. Hypophosphatemia- resolved. Monitor serum phos    4. HTN- BP elevated. Ok to restart Losartan. Continue to hold HCTZ due to hyponatremia (would hold on d/c). Monitor BP    5. Hypocalcemia-resolved. Monitor ionized Ca.

## 2020-07-21 NOTE — PROGRESS NOTE ADULT - SUBJECTIVE AND OBJECTIVE BOX
[facility] NEPHROLOGY- PROGRESS NOTE    Patient is a 78yo Bangladeshi speaking Female with HTN on Losartan/ HCTZ, HLD p/w n/v x 2 days. Pt found to have serum Na 115. Nephrology consulted for hyponatremia    Hospital Medications: Medications reviewed.  REVIEW OF SYSTEMS:   CONSTITUTIONAL: No fevers or chills  RESPIRATORY: No shortness of breath  CARDIOVASCULAR: No chest pain.  GASTROINTESTINAL: No nausea, vomiting, diarrhea or abdominal pain. +constipation resolved  VASCULAR: No bilateral lower extremity edema.     VITALS:  T(F): 97.5 (20 @ 11:40), Max: 98.6 (20 @ 21:04)  HR: 63 (20 @ 11:40)  BP: 156/69 (20 @ 11:40)  RR: 18 (20 @ 11:40)  SpO2: 98% (20 @ 11:40)  Wt(kg): --      PHYSICAL EXAM:  Constitutional: NAD  Neurological: Awake and Alert  HEENT: anicteric sclera,   Respiratory: CTAB, no wheezes, rales or rhonchi  Cardiovascular: S1, S2, RRR  Gastrointestinal: BS+, soft, NT/ND  : No ventura.  Extremities: No peripheral edema    LABS:    137 <--, 137 <--, 134 <--, 133 <--, 131 <--, 125 <--, 125 <--  137  |  102  |  16  ----------------------------<  87  5.2   |  28  |  0.81    Ca    9.0      2020 06:38  Phos  4.0       Mg     2.5         TPro  6.6  /  Alb  3.0<L>  /  TBili  0.3  /  DBili      /  AST  25  /  ALT  38  /  AlkPhos  31<L>      Creatinine Trend: 0.81 <--, 0.84 <--, 1.01 <--, 0.79 <--, 0.64 <--, 0.73 <--, 0.77 <--, 0.42 <--, 0.65 <--, 0.78 <--, 0.92 <--, 0.73 <--, 0.64 <--, 0.70 <--, 0.68 <--, 0.70 <--                        11.0   5.37  )-----------( 274      ( 2020 06:38 )             33.8     Urine Studies:  Urinalysis Basic - ( 2020 20:15 )    Color: Yellow / Appearance: Clear / S.005 / pH:   Gluc:  / Ketone: Moderate  / Bili: Negative / Urobili: Negative   Blood:  / Protein: Negative / Nitrite: Negative   Leuk Esterase: Negative / RBC: 0-2 /HPF / WBC 0-2 /HPF   Sq Epi:  / Non Sq Epi: Few /HPF / Bacteria: Trace /HPF      Creatinine, Random Urine: <13 mg/dL ( @ 08:45)  Sodium, Random Urine: 26 mmol/L ( @ 08:45)  Osmolality, Random Urine: 102 mos/kg ( @ 08:44)  Sodium, Random Urine: 77 mmol/L ( @ 20:15)  Osmolality, Random Urine: 317 mos/kg ( @ 20:15)  Creatinine, Random Urine: 32 mg/dL ( @ 20:15)

## 2020-07-21 NOTE — PROGRESS NOTE ADULT - PROVIDER SPECIALTY LIST ADULT
Critical Care
Internal Medicine
Nephrology

## 2020-07-21 NOTE — PROGRESS NOTE ADULT - REASON FOR ADMISSION
Nausea and vomiting x 2 days

## 2020-09-14 NOTE — ED PROVIDER NOTE - NS ED MD DISPO ADMITTING SERVICE
Called and spoke with pt, and she has been  advised and states understanding of results and agrees with plan.
Please call patient and let her know her liver function tests are still high but better. She needs additional lab work and an ultrasound. I have printed orders.
ICU

## 2021-08-20 ENCOUNTER — INPATIENT (INPATIENT)
Facility: HOSPITAL | Age: 81
LOS: 3 days | Discharge: HOME | DRG: 641 | End: 2021-08-24
Attending: INTERNAL MEDICINE | Admitting: INTERNAL MEDICINE
Payer: MEDICARE

## 2021-08-20 VITALS
OXYGEN SATURATION: 97 % | RESPIRATION RATE: 16 BRPM | WEIGHT: 151.9 LBS | SYSTOLIC BLOOD PRESSURE: 161 MMHG | HEIGHT: 61 IN | TEMPERATURE: 99 F | HEART RATE: 60 BPM | DIASTOLIC BLOOD PRESSURE: 71 MMHG

## 2021-08-20 DIAGNOSIS — E87.1 HYPO-OSMOLALITY AND HYPONATREMIA: ICD-10-CM

## 2021-08-20 LAB
ALBUMIN SERPL ELPH-MCNC: 3.5 G/DL — SIGNIFICANT CHANGE UP (ref 3.5–5)
ALP SERPL-CCNC: 80 U/L — SIGNIFICANT CHANGE UP (ref 40–120)
ALT FLD-CCNC: 22 U/L DA — SIGNIFICANT CHANGE UP (ref 10–60)
ANION GAP SERPL CALC-SCNC: 9 MMOL/L — SIGNIFICANT CHANGE UP (ref 5–17)
APPEARANCE UR: ABNORMAL
AST SERPL-CCNC: 19 U/L — SIGNIFICANT CHANGE UP (ref 10–40)
BASOPHILS # BLD AUTO: 0.01 K/UL — SIGNIFICANT CHANGE UP (ref 0–0.2)
BASOPHILS NFR BLD AUTO: 0.1 % — SIGNIFICANT CHANGE UP (ref 0–2)
BILIRUB SERPL-MCNC: 0.6 MG/DL — SIGNIFICANT CHANGE UP (ref 0.2–1.2)
BILIRUB UR-MCNC: NEGATIVE — SIGNIFICANT CHANGE UP
BUN SERPL-MCNC: 9 MG/DL — SIGNIFICANT CHANGE UP (ref 7–18)
CALCIUM SERPL-MCNC: 8.4 MG/DL — SIGNIFICANT CHANGE UP (ref 8.4–10.5)
CHLORIDE SERPL-SCNC: 82 MMOL/L — LOW (ref 96–108)
CHLORIDE UR-SCNC: 35 MMOL/L — SIGNIFICANT CHANGE UP
CO2 SERPL-SCNC: 24 MMOL/L — SIGNIFICANT CHANGE UP (ref 22–31)
COLOR SPEC: YELLOW — SIGNIFICANT CHANGE UP
CREAT ?TM UR-MCNC: <13 MG/DL — SIGNIFICANT CHANGE UP
CREAT SERPL-MCNC: 0.53 MG/DL — SIGNIFICANT CHANGE UP (ref 0.5–1.3)
DIFF PNL FLD: ABNORMAL
EOSINOPHIL # BLD AUTO: 0 K/UL — SIGNIFICANT CHANGE UP (ref 0–0.5)
EOSINOPHIL NFR BLD AUTO: 0 % — SIGNIFICANT CHANGE UP (ref 0–6)
GLUCOSE SERPL-MCNC: 106 MG/DL — HIGH (ref 70–99)
GLUCOSE UR QL: NEGATIVE — SIGNIFICANT CHANGE UP
HCT VFR BLD CALC: 30 % — LOW (ref 34.5–45)
HGB BLD-MCNC: 10.6 G/DL — LOW (ref 11.5–15.5)
IMM GRANULOCYTES NFR BLD AUTO: 0.8 % — SIGNIFICANT CHANGE UP (ref 0–1.5)
KETONES UR-MCNC: ABNORMAL
LACTATE SERPL-SCNC: 0.7 MMOL/L — SIGNIFICANT CHANGE UP (ref 0.7–2)
LEUKOCYTE ESTERASE UR-ACNC: ABNORMAL
LYMPHOCYTES # BLD AUTO: 0.81 K/UL — LOW (ref 1–3.3)
LYMPHOCYTES # BLD AUTO: 10.2 % — LOW (ref 13–44)
MCHC RBC-ENTMCNC: 29.5 PG — SIGNIFICANT CHANGE UP (ref 27–34)
MCHC RBC-ENTMCNC: 35.3 GM/DL — SIGNIFICANT CHANGE UP (ref 32–36)
MCV RBC AUTO: 83.6 FL — SIGNIFICANT CHANGE UP (ref 80–100)
MONOCYTES # BLD AUTO: 0.62 K/UL — SIGNIFICANT CHANGE UP (ref 0–0.9)
MONOCYTES NFR BLD AUTO: 7.8 % — SIGNIFICANT CHANGE UP (ref 2–14)
NEUTROPHILS # BLD AUTO: 6.48 K/UL — SIGNIFICANT CHANGE UP (ref 1.8–7.4)
NEUTROPHILS NFR BLD AUTO: 81.1 % — HIGH (ref 43–77)
NITRITE UR-MCNC: NEGATIVE — SIGNIFICANT CHANGE UP
NRBC # BLD: 0 /100 WBCS — SIGNIFICANT CHANGE UP (ref 0–0)
OSMOLALITY SERPL: 240 MOSMOL/KG — LOW (ref 280–301)
OSMOLALITY UR: 184 MOS/KG — SIGNIFICANT CHANGE UP (ref 50–1200)
PH UR: 8 — SIGNIFICANT CHANGE UP (ref 5–8)
PLATELET # BLD AUTO: 347 K/UL — SIGNIFICANT CHANGE UP (ref 150–400)
POTASSIUM SERPL-MCNC: 3.7 MMOL/L — SIGNIFICANT CHANGE UP (ref 3.5–5.3)
POTASSIUM SERPL-SCNC: 3.7 MMOL/L — SIGNIFICANT CHANGE UP (ref 3.5–5.3)
POTASSIUM UR-SCNC: 18 MMOL/L — SIGNIFICANT CHANGE UP
PROT SERPL-MCNC: 7.2 G/DL — SIGNIFICANT CHANGE UP (ref 6–8.3)
PROT UR-MCNC: 15
RAPID RVP RESULT: SIGNIFICANT CHANGE UP
RBC # BLD: 3.59 M/UL — LOW (ref 3.8–5.2)
RBC # FLD: 12.5 % — SIGNIFICANT CHANGE UP (ref 10.3–14.5)
SARS-COV-2 RNA SPEC QL NAA+PROBE: SIGNIFICANT CHANGE UP
SODIUM SERPL-SCNC: 115 MMOL/L — CRITICAL LOW (ref 135–145)
SODIUM UR-SCNC: 35 MMOL/L — SIGNIFICANT CHANGE UP
SP GR SPEC: 1.01 — SIGNIFICANT CHANGE UP (ref 1.01–1.02)
TROPONIN I SERPL-MCNC: <0.015 NG/ML — SIGNIFICANT CHANGE UP (ref 0–0.04)
URATE SERPL-MCNC: 1.4 MG/DL — LOW (ref 2.5–7)
UROBILINOGEN FLD QL: NEGATIVE — SIGNIFICANT CHANGE UP
WBC # BLD: 7.98 K/UL — SIGNIFICANT CHANGE UP (ref 3.8–10.5)
WBC # FLD AUTO: 7.98 K/UL — SIGNIFICANT CHANGE UP (ref 3.8–10.5)

## 2021-08-20 PROCEDURE — 71045 X-RAY EXAM CHEST 1 VIEW: CPT | Mod: 26

## 2021-08-20 PROCEDURE — 99291 CRITICAL CARE FIRST HOUR: CPT

## 2021-08-20 PROCEDURE — 70450 CT HEAD/BRAIN W/O DYE: CPT | Mod: 26

## 2021-08-20 PROCEDURE — 99222 1ST HOSP IP/OBS MODERATE 55: CPT | Mod: GC

## 2021-08-20 RX ORDER — MAGNESIUM OXIDE 400 MG ORAL TABLET 241.3 MG
1 TABLET ORAL
Qty: 0 | Refills: 0 | DISCHARGE

## 2021-08-20 RX ORDER — LEVOTHYROXINE SODIUM 125 MCG
50 TABLET ORAL DAILY
Refills: 0 | Status: DISCONTINUED | OUTPATIENT
Start: 2021-08-20 | End: 2021-08-24

## 2021-08-20 RX ORDER — AMLODIPINE BESYLATE 2.5 MG/1
1 TABLET ORAL
Qty: 0 | Refills: 0 | DISCHARGE

## 2021-08-20 RX ORDER — CEFTRIAXONE 500 MG/1
INJECTION, POWDER, FOR SOLUTION INTRAMUSCULAR; INTRAVENOUS
Refills: 0 | Status: COMPLETED | OUTPATIENT
Start: 2021-08-20 | End: 2021-08-24

## 2021-08-20 RX ORDER — HYDRALAZINE HCL 50 MG
25 TABLET ORAL
Refills: 0 | Status: DISCONTINUED | OUTPATIENT
Start: 2021-08-20 | End: 2021-08-24

## 2021-08-20 RX ORDER — CHLORHEXIDINE GLUCONATE 213 G/1000ML
1 SOLUTION TOPICAL
Refills: 0 | Status: DISCONTINUED | OUTPATIENT
Start: 2021-08-20 | End: 2021-08-20

## 2021-08-20 RX ORDER — LATANOPROST 0.05 MG/ML
1 SOLUTION/ DROPS OPHTHALMIC; TOPICAL AT BEDTIME
Refills: 0 | Status: DISCONTINUED | OUTPATIENT
Start: 2021-08-20 | End: 2021-08-24

## 2021-08-20 RX ORDER — AMIODARONE HYDROCHLORIDE 400 MG/1
100 TABLET ORAL
Refills: 0 | Status: DISCONTINUED | OUTPATIENT
Start: 2021-08-20 | End: 2021-08-24

## 2021-08-20 RX ORDER — SIMVASTATIN 20 MG/1
10 TABLET, FILM COATED ORAL AT BEDTIME
Refills: 0 | Status: DISCONTINUED | OUTPATIENT
Start: 2021-08-20 | End: 2021-08-24

## 2021-08-20 RX ORDER — AMLODIPINE BESYLATE 2.5 MG/1
5 TABLET ORAL DAILY
Refills: 0 | Status: DISCONTINUED | OUTPATIENT
Start: 2021-08-20 | End: 2021-08-24

## 2021-08-20 RX ORDER — NITROGLYCERIN 6.5 MG
1 CAPSULE, EXTENDED RELEASE ORAL
Qty: 0 | Refills: 0 | DISCHARGE

## 2021-08-20 RX ORDER — SIMVASTATIN 20 MG/1
1 TABLET, FILM COATED ORAL
Qty: 0 | Refills: 0 | DISCHARGE

## 2021-08-20 RX ORDER — ONDANSETRON 8 MG/1
4 TABLET, FILM COATED ORAL ONCE
Refills: 0 | Status: COMPLETED | OUTPATIENT
Start: 2021-08-20 | End: 2021-08-20

## 2021-08-20 RX ORDER — LOSARTAN POTASSIUM 100 MG/1
1 TABLET, FILM COATED ORAL
Qty: 0 | Refills: 0 | DISCHARGE

## 2021-08-20 RX ORDER — CEFTRIAXONE 500 MG/1
1000 INJECTION, POWDER, FOR SOLUTION INTRAMUSCULAR; INTRAVENOUS ONCE
Refills: 0 | Status: COMPLETED | OUTPATIENT
Start: 2021-08-20 | End: 2021-08-20

## 2021-08-20 RX ORDER — CEFTRIAXONE 500 MG/1
1000 INJECTION, POWDER, FOR SOLUTION INTRAMUSCULAR; INTRAVENOUS EVERY 24 HOURS
Refills: 0 | Status: COMPLETED | OUTPATIENT
Start: 2021-08-21 | End: 2021-08-23

## 2021-08-20 RX ORDER — SOTALOL HCL 120 MG
1 TABLET ORAL
Qty: 0 | Refills: 0 | DISCHARGE

## 2021-08-20 RX ORDER — APIXABAN 2.5 MG/1
5 TABLET, FILM COATED ORAL
Refills: 0 | Status: DISCONTINUED | OUTPATIENT
Start: 2021-08-20 | End: 2021-08-24

## 2021-08-20 RX ORDER — TIMOLOL 0.5 %
1 DROPS OPHTHALMIC (EYE) DAILY
Refills: 0 | Status: DISCONTINUED | OUTPATIENT
Start: 2021-08-20 | End: 2021-08-24

## 2021-08-20 RX ORDER — SODIUM CHLORIDE 9 MG/ML
1000 INJECTION INTRAMUSCULAR; INTRAVENOUS; SUBCUTANEOUS ONCE
Refills: 0 | Status: COMPLETED | OUTPATIENT
Start: 2021-08-20 | End: 2021-08-20

## 2021-08-20 RX ADMIN — ONDANSETRON 4 MILLIGRAM(S): 8 TABLET, FILM COATED ORAL at 19:50

## 2021-08-20 RX ADMIN — SODIUM CHLORIDE 1000 MILLILITER(S): 9 INJECTION INTRAMUSCULAR; INTRAVENOUS; SUBCUTANEOUS at 19:52

## 2021-08-20 NOTE — H&P ADULT - ASSESSMENT
80 year old female with medical hx of HTN, HLD , Hypothyroidism, afib ? ( Patient on  amiodarone and Eliquis)  was brought to ED by daughter for progressive weakness and lethargy. Patient being admitted to ICU for sever hyponatremia.      Assessment:       - Severe Hyponatremia ( Hypovolemic Vs SIADH)  - Anemia  - UTI  - HTN  - Afib  - Hypothyroidism  -         Neuro:    Patient is AAO X 2.  Will get  CT head.        Cardio:    Afib    Patient with Afib on Amiodarone and Eliquis will continue with home meds    HTN    Patient on Amlodipine and hydralazine will continue with home meds        Pulm:    no acute issues.  CXR clear      GI:    Vomiting:  Zpfran prn for vomiting.  NPO except meds.  will start on diet once stable        Renal:    Sever Hyponatremia:    Patients serum na noted to be 115.  s/p 900 ml in ED.  urine lytes were sent after fluids.   urine Na 35, [place] 180  serum osm low  likely hypovolemic in setting of vomiting and incraesed urination Vs SIADH.  BMP q4.  check TSH and Cortisol  monitor electrolytes.  Avoid over correction. goal of correction 6-8 meq in 24 hrs.        Endo:    Patient with hypothyroidism,    cw levothyroxine  follow TSH  Follow A1c.      ID:    UTI  Patient with positive UA and incraesed frequency.  Will start Rocephin   Follow Urine Cx.        Catheters    Zendejas Catheter placed in ED 8/20        DVT PPx:    Eliquis for Dvt ppx    Dispo:  Will Admit the patient to ICU for severe hyponatremia

## 2021-08-20 NOTE — H&P ADULT - ATTENDING COMMENTS
81 y/o with HTN, A/FIB , history of severe hypoNa in 2020 presented with weakness, lethargy, vomiting and nausea.     A/P symptomatic hypo Na hypovolemic 2 to dehydration plus isovolemic 2 to SIADH   HTN   A/FIB   MICU   check serum osmolarity, serum uric acid, TSH, cortisol  careful hydration   Monitor serum Na closely   Avoid rapid correction  Nephrology eval  Cont other meds  CT head   DVT/GI prophylaxis

## 2021-08-20 NOTE — ED ADULT NURSE REASSESSMENT NOTE - NS ED NURSE REASSESS COMMENT FT1
received pt awake alert,not in dsitress,with saline lock intact,no redness,no swelling noted,with daughter at bedside.

## 2021-08-20 NOTE — ED PROVIDER NOTE - NEUROLOGICAL, MLM
Alert and oriented to person and place, no focal deficits, no motor or sensory deficits.  Able to walk with cane.

## 2021-08-20 NOTE — H&P ADULT - HISTORY OF PRESENT ILLNESS
80 year old female with medical hx of HTN, HLD , Hypothyroidism, afib ? ( Patient on  amiodarone and Eliquis)  was brought to ED by daughter for progressive weakness and lethargy. Patient is AAO X 2 , most of hx was given by daughter at bed side. She stated that patient was feeling nauseous and then  having vomiting yesterday every hour and was not feeling good. Patient denied any pain , fever, chills at home. Patients daughter endorsed increased urinary frequency but denied any burning urination. Daughter Anna at bed side also added taht 2 days ago patient got steroid injection at back for back pain.   denied head ache, numbness , tingling , diarrhea fever, chills, or other problems. 80 year old female with medical hx of HTN, HLD , Hypothyroidism, afib ? ( Patient on  amiodarone and Eliquis)  was brought to ED by daughter for progressive weakness and lethargy. Patient is AAO X 2 , most of hx was given by daughter at bed side. She stated that patient was feeling nauseous and then  having vomiting yesterday every hour and was not feeling good. Patient denied any pain , fever, chills at home. Patients daughter endorsed increased urinary frequency but denied any burning urination. Daughter Anna at bed side also added taht 2 days ago patient got steroid injection at back for back pain.   denied head ache, numbness , tingling , diarrhea fever, chills, or other problems.  Patient is unvaccinated against COVID 19.

## 2021-08-20 NOTE — ED ADULT TRIAGE NOTE - CHIEF COMPLAINT QUOTE
Generalized weakness, no appetite , vomiting since yesterday, and this morning shivering , denies fever Hx Hyponatremia

## 2021-08-20 NOTE — ED PROVIDER NOTE - CLINICAL SUMMARY MEDICAL DECISION MAKING FREE TEXT BOX
79 y/o woman, h/o hyponatremia (admitted here last year), HTN, hypothyroidism, presents with daughter for nausea and vomiting frequently since last night, with generalized weakness, mild confusion, and daughter is concerned for recurrent hyponatremia--Labs, EKG, CT head, UA, anticipate admission.

## 2021-08-20 NOTE — ED PROVIDER NOTE - DISPOSITION TYPE
Problem: Postoperative Care  Goal: Vital signs are maintained within parameters  Outcome: Outcome Met, Continue evaluating goal progress toward completion  Goal: Elimination status is maintained/returned to baseline  Outcome: Outcome Met, Continue evaluating goal progress toward completion  Goal: Activity level is resumed to level needed for d/c  Outcome: Outcome Met, Continue evaluating goal progress toward completion  Goal: Verbalizes understanding of postoperative care in the hospital and after d/c  Description: Document on Patient Education Activity  Outcome: Outcome Met, Continue evaluating goal progress toward completion     Problem: At Risk for Falls  Goal: # Patient does not fall  Outcome: Outcome Met, Continue evaluating goal progress toward completion  Goal: # Takes action to control fall-related risks  Outcome: Outcome Met, Continue evaluating goal progress toward completion  Goal: # Verbalizes understanding of fall risk/precautions  Description: Document education using the patient education activity  Outcome: Outcome Met, Continue evaluating goal progress toward completion     
Pt advanced diet as tolerated to baseline, pt up and walking to commode, elimination status is maintained. Pain is manageable with nonpharmacologic measures. Blood pressure has been low today, Hydralazine held for both AM and PM shifts  
ADMIT

## 2021-08-20 NOTE — ED ADULT NURSE NOTE - OBJECTIVE STATEMENT
PAtient present to ED BIB daughter at bedside as per daughter patient has been weak , hot to touch inability to speak since 3 hours ago. As per daughter patient was normal yesterday able to carry a conversation A&OX3 . At present time patient non verbal. Had similar issue last year sodium waS NOTED TO BE LOW AS PER DAUGHTER

## 2021-08-20 NOTE — H&P ADULT - NSHPPHYSICALEXAM_GEN_ALL_CORE
Vital Signs Last 24 Hrs  T(C): 36.3 (20 Aug 2021 20:01), Max: 37.3 (20 Aug 2021 17:08)  T(F): 97.4 (20 Aug 2021 20:01), Max: 99.2 (20 Aug 2021 17:08)  HR: 58 (20 Aug 2021 20:01) (58 - 60)  BP: 156/68 (20 Aug 2021 20:01) (156/68 - 161/71)  BP(mean): --  RR: 18 (20 Aug 2021 20:01) (16 - 18)  SpO2: 96% (20 Aug 2021 20:01) (96% - 97%)    GENERAL: Elderly obese female.  HEAD:  Atraumatic, Normocephalic  EYES: EOMI, PERRLA, conjunctiva and sclera clear  NECK: Supple, No JVD  CHEST/LUNG: Clear to auscultation bilaterally; No wheeze; No crackles; No accessory muscles used  HEART: Regular rate and rhythm; No murmurs;   ABDOMEN: Soft, Nontender, Nondistended; Bowel sounds present; No guarding  EXTREMITIES:  2+ Peripheral Pulses, No cyanosis or edema  PSYCH:  Normal Affect  NEUROLOGY: non-focal, AAO X 2, not oriented to time, oriented to person and place.  SKIN: No rashes or lesions

## 2021-08-20 NOTE — PATIENT PROFILE ADULT - PUBLIC BENEFITS
Telephone Encounter by Wendi Miranda RN at 12/19/17 11:18 AM     Author:  Wendi Miranda RN Service:  (none) Author Type:  Registered Nurse     Filed:  12/19/17 11:19 AM Encounter Date:  12/10/2017 Status:  Signed     :  Wendi Miranda RN (Registered Nurse)            aPPT MADE  12/20/17    Patient notified[DC1.1M]      Revision History        User Key Date/Time User Provider Type Action    > DC1.1 12/19/17 11:19 AM Wendi Miranda RN Registered Nurse Sign    M - Manual             no

## 2021-08-20 NOTE — ED PROVIDER NOTE - OBJECTIVE STATEMENT
79 y/o woman, h/o hyponatremia (admitted here last year), HTN, hypothyroidism, presents with daughter for nausea and vomiting frequently since last night, with generalized weakness, mild confusion, and daughter is concerned for recurrent hyponatremia.  No fever/abdominal pain/headache/CP.  Pt's medication list includes Eliquis; does not include diuretic.  In 2020 Pt was admitted here for hyponatremia, with documentation stating it was due to hypovolemia and HCTZ, which was stopped.

## 2021-08-21 PROBLEM — I10 ESSENTIAL (PRIMARY) HYPERTENSION: Chronic | Status: ACTIVE | Noted: 2020-07-16

## 2021-08-21 LAB
A1C WITH ESTIMATED AVERAGE GLUCOSE RESULT: 5.5 % — SIGNIFICANT CHANGE UP (ref 4–5.6)
ANION GAP SERPL CALC-SCNC: 6 MMOL/L — SIGNIFICANT CHANGE UP (ref 5–17)
ANION GAP SERPL CALC-SCNC: 7 MMOL/L — SIGNIFICANT CHANGE UP (ref 5–17)
ANION GAP SERPL CALC-SCNC: 8 MMOL/L — SIGNIFICANT CHANGE UP (ref 5–17)
ANION GAP SERPL CALC-SCNC: 8 MMOL/L — SIGNIFICANT CHANGE UP (ref 5–17)
ANION GAP SERPL CALC-SCNC: 9 MMOL/L — SIGNIFICANT CHANGE UP (ref 5–17)
ANION GAP SERPL CALC-SCNC: 9 MMOL/L — SIGNIFICANT CHANGE UP (ref 5–17)
BUN SERPL-MCNC: 10 MG/DL — SIGNIFICANT CHANGE UP (ref 7–18)
BUN SERPL-MCNC: 10 MG/DL — SIGNIFICANT CHANGE UP (ref 7–18)
BUN SERPL-MCNC: 11 MG/DL — SIGNIFICANT CHANGE UP (ref 7–18)
BUN SERPL-MCNC: 9 MG/DL — SIGNIFICANT CHANGE UP (ref 7–18)
CALCIUM SERPL-MCNC: 8.3 MG/DL — LOW (ref 8.4–10.5)
CALCIUM SERPL-MCNC: 8.5 MG/DL — SIGNIFICANT CHANGE UP (ref 8.4–10.5)
CALCIUM SERPL-MCNC: 8.6 MG/DL — SIGNIFICANT CHANGE UP (ref 8.4–10.5)
CALCIUM SERPL-MCNC: 8.7 MG/DL — SIGNIFICANT CHANGE UP (ref 8.4–10.5)
CALCIUM UR-MCNC: 5.4 MG/DL — SIGNIFICANT CHANGE UP
CHLORIDE SERPL-SCNC: 90 MMOL/L — LOW (ref 96–108)
CHLORIDE SERPL-SCNC: 92 MMOL/L — LOW (ref 96–108)
CHLORIDE SERPL-SCNC: 92 MMOL/L — LOW (ref 96–108)
CHLORIDE SERPL-SCNC: 95 MMOL/L — LOW (ref 96–108)
CHLORIDE SERPL-SCNC: 98 MMOL/L — SIGNIFICANT CHANGE UP (ref 96–108)
CHLORIDE SERPL-SCNC: 99 MMOL/L — SIGNIFICANT CHANGE UP (ref 96–108)
CO2 SERPL-SCNC: 22 MMOL/L — SIGNIFICANT CHANGE UP (ref 22–31)
CO2 SERPL-SCNC: 23 MMOL/L — SIGNIFICANT CHANGE UP (ref 22–31)
CO2 SERPL-SCNC: 24 MMOL/L — SIGNIFICANT CHANGE UP (ref 22–31)
CO2 SERPL-SCNC: 25 MMOL/L — SIGNIFICANT CHANGE UP (ref 22–31)
CORTIS F PM SERPL-MCNC: 5.3 UG/DL — SIGNIFICANT CHANGE UP (ref 2.7–10.5)
COVID-19 SPIKE DOMAIN AB INTERP: POSITIVE
COVID-19 SPIKE DOMAIN ANTIBODY RESULT: 0.89 U/ML — HIGH
CREAT ?TM UR-MCNC: <13 MG/DL — SIGNIFICANT CHANGE UP
CREAT SERPL-MCNC: 0.58 MG/DL — SIGNIFICANT CHANGE UP (ref 0.5–1.3)
CREAT SERPL-MCNC: 0.58 MG/DL — SIGNIFICANT CHANGE UP (ref 0.5–1.3)
CREAT SERPL-MCNC: 0.64 MG/DL — SIGNIFICANT CHANGE UP (ref 0.5–1.3)
CREAT SERPL-MCNC: 0.66 MG/DL — SIGNIFICANT CHANGE UP (ref 0.5–1.3)
CREAT SERPL-MCNC: 0.68 MG/DL — SIGNIFICANT CHANGE UP (ref 0.5–1.3)
CREAT SERPL-MCNC: 0.68 MG/DL — SIGNIFICANT CHANGE UP (ref 0.5–1.3)
ESTIMATED AVERAGE GLUCOSE: 111 MG/DL — SIGNIFICANT CHANGE UP (ref 68–114)
FERRITIN SERPL-MCNC: 110 NG/ML — SIGNIFICANT CHANGE UP (ref 15–150)
GLUCOSE SERPL-MCNC: 103 MG/DL — HIGH (ref 70–99)
GLUCOSE SERPL-MCNC: 105 MG/DL — HIGH (ref 70–99)
GLUCOSE SERPL-MCNC: 108 MG/DL — HIGH (ref 70–99)
GLUCOSE SERPL-MCNC: 123 MG/DL — HIGH (ref 70–99)
GLUCOSE SERPL-MCNC: 141 MG/DL — HIGH (ref 70–99)
GLUCOSE SERPL-MCNC: 97 MG/DL — SIGNIFICANT CHANGE UP (ref 70–99)
HCT VFR BLD CALC: 31.4 % — LOW (ref 34.5–45)
HGB BLD-MCNC: 11 G/DL — LOW (ref 11.5–15.5)
IRON SATN MFR SERPL: 30 % — SIGNIFICANT CHANGE UP (ref 15–50)
IRON SATN MFR SERPL: 95 UG/DL — SIGNIFICANT CHANGE UP (ref 40–150)
MAGNESIUM SERPL-MCNC: 2.2 MG/DL — SIGNIFICANT CHANGE UP (ref 1.6–2.6)
MAGNESIUM SERPL-MCNC: 2.4 MG/DL — SIGNIFICANT CHANGE UP (ref 1.6–2.6)
MAGNESIUM SERPL-MCNC: 2.4 MG/DL — SIGNIFICANT CHANGE UP (ref 1.6–2.6)
MAGNESIUM UR-MCNC: 1.5 MG/DL — SIGNIFICANT CHANGE UP
MCHC RBC-ENTMCNC: 29.5 PG — SIGNIFICANT CHANGE UP (ref 27–34)
MCHC RBC-ENTMCNC: 35 GM/DL — SIGNIFICANT CHANGE UP (ref 32–36)
MCV RBC AUTO: 84.2 FL — SIGNIFICANT CHANGE UP (ref 80–100)
NRBC # BLD: 0 /100 WBCS — SIGNIFICANT CHANGE UP (ref 0–0)
OSMOLALITY SERPL: 252 MOSMOL/KG — LOW (ref 280–301)
OSMOLALITY UR: 440 MOS/KG — SIGNIFICANT CHANGE UP (ref 50–1200)
OSMOLALITY UR: 81 MOS/KG — SIGNIFICANT CHANGE UP (ref 50–1200)
PHOSPHATE SERPL-MCNC: 2.8 MG/DL — SIGNIFICANT CHANGE UP (ref 2.5–4.5)
PHOSPHATE SERPL-MCNC: 3 MG/DL — SIGNIFICANT CHANGE UP (ref 2.5–4.5)
PHOSPHATE SERPL-MCNC: 3 MG/DL — SIGNIFICANT CHANGE UP (ref 2.5–4.5)
PLATELET # BLD AUTO: 327 K/UL — SIGNIFICANT CHANGE UP (ref 150–400)
POTASSIUM SERPL-MCNC: 3.3 MMOL/L — LOW (ref 3.5–5.3)
POTASSIUM SERPL-MCNC: 3.4 MMOL/L — LOW (ref 3.5–5.3)
POTASSIUM SERPL-MCNC: 3.5 MMOL/L — SIGNIFICANT CHANGE UP (ref 3.5–5.3)
POTASSIUM SERPL-MCNC: 3.7 MMOL/L — SIGNIFICANT CHANGE UP (ref 3.5–5.3)
POTASSIUM SERPL-MCNC: 3.9 MMOL/L — SIGNIFICANT CHANGE UP (ref 3.5–5.3)
POTASSIUM SERPL-MCNC: 4.4 MMOL/L — SIGNIFICANT CHANGE UP (ref 3.5–5.3)
POTASSIUM SERPL-SCNC: 3.3 MMOL/L — LOW (ref 3.5–5.3)
POTASSIUM SERPL-SCNC: 3.4 MMOL/L — LOW (ref 3.5–5.3)
POTASSIUM SERPL-SCNC: 3.5 MMOL/L — SIGNIFICANT CHANGE UP (ref 3.5–5.3)
POTASSIUM SERPL-SCNC: 3.7 MMOL/L — SIGNIFICANT CHANGE UP (ref 3.5–5.3)
POTASSIUM SERPL-SCNC: 3.9 MMOL/L — SIGNIFICANT CHANGE UP (ref 3.5–5.3)
POTASSIUM SERPL-SCNC: 4.4 MMOL/L — SIGNIFICANT CHANGE UP (ref 3.5–5.3)
RBC # BLD: 3.73 M/UL — LOW (ref 3.8–5.2)
RBC # BLD: 3.73 M/UL — LOW (ref 3.8–5.2)
RBC # FLD: 12.7 % — SIGNIFICANT CHANGE UP (ref 10.3–14.5)
RETICS #: 73.1 K/UL — SIGNIFICANT CHANGE UP (ref 25–125)
RETICS/RBC NFR: 2 % — SIGNIFICANT CHANGE UP (ref 0.5–2.5)
SARS-COV-2 IGG+IGM SERPL QL IA: 0.89 U/ML — HIGH
SARS-COV-2 IGG+IGM SERPL QL IA: POSITIVE
SODIUM SERPL-SCNC: 124 MMOL/L — LOW (ref 135–145)
SODIUM SERPL-SCNC: 126 MMOL/L — LOW (ref 135–145)
SODIUM SERPL-SCNC: 127 MMOL/L — LOW (ref 135–145)
SODIUM SERPL-SCNC: 132 MMOL/L — LOW (ref 135–145)
SODIUM UR-SCNC: 21 MMOL/L — SIGNIFICANT CHANGE UP
SODIUM UR-SCNC: 38 MMOL/L — SIGNIFICANT CHANGE UP
TIBC SERPL-MCNC: 311 UG/DL — SIGNIFICANT CHANGE UP (ref 250–450)
TSH SERPL-MCNC: 4.32 UU/ML — SIGNIFICANT CHANGE UP (ref 0.34–4.82)
UIBC SERPL-MCNC: 216 UG/DL — SIGNIFICANT CHANGE UP (ref 110–370)
WBC # BLD: 6.39 K/UL — SIGNIFICANT CHANGE UP (ref 3.8–10.5)
WBC # FLD AUTO: 6.39 K/UL — SIGNIFICANT CHANGE UP (ref 3.8–10.5)

## 2021-08-21 RX ORDER — DESMOPRESSIN ACETATE 0.1 MG/1
2 TABLET ORAL
Refills: 0 | Status: DISCONTINUED | OUTPATIENT
Start: 2021-08-21 | End: 2021-08-21

## 2021-08-21 RX ORDER — POTASSIUM CHLORIDE 20 MEQ
40 PACKET (EA) ORAL ONCE
Refills: 0 | Status: COMPLETED | OUTPATIENT
Start: 2021-08-21 | End: 2021-08-21

## 2021-08-21 RX ORDER — SODIUM CHLORIDE 9 MG/ML
1000 INJECTION, SOLUTION INTRAVENOUS
Refills: 0 | Status: COMPLETED | OUTPATIENT
Start: 2021-08-21 | End: 2021-08-21

## 2021-08-21 RX ORDER — SODIUM CHLORIDE 9 MG/ML
1000 INJECTION, SOLUTION INTRAVENOUS
Refills: 0 | Status: DISCONTINUED | OUTPATIENT
Start: 2021-08-21 | End: 2021-08-21

## 2021-08-21 RX ORDER — LANOLIN ALCOHOL/MO/W.PET/CERES
3 CREAM (GRAM) TOPICAL AT BEDTIME
Refills: 0 | Status: COMPLETED | OUTPATIENT
Start: 2021-08-21 | End: 2021-08-21

## 2021-08-21 RX ADMIN — DESMOPRESSIN ACETATE 2 MICROGRAM(S): 0.1 TABLET ORAL at 06:31

## 2021-08-21 RX ADMIN — CEFTRIAXONE 100 MILLIGRAM(S): 500 INJECTION, POWDER, FOR SOLUTION INTRAMUSCULAR; INTRAVENOUS at 01:44

## 2021-08-21 RX ADMIN — LATANOPROST 1 DROP(S): 0.05 SOLUTION/ DROPS OPHTHALMIC; TOPICAL at 21:05

## 2021-08-21 RX ADMIN — SODIUM CHLORIDE 85 MILLILITER(S): 9 INJECTION, SOLUTION INTRAVENOUS at 01:05

## 2021-08-21 RX ADMIN — Medication 50 MICROGRAM(S): at 05:36

## 2021-08-21 RX ADMIN — APIXABAN 5 MILLIGRAM(S): 2.5 TABLET, FILM COATED ORAL at 05:36

## 2021-08-21 RX ADMIN — APIXABAN 5 MILLIGRAM(S): 2.5 TABLET, FILM COATED ORAL at 17:57

## 2021-08-21 RX ADMIN — SODIUM CHLORIDE 125 MILLILITER(S): 9 INJECTION, SOLUTION INTRAVENOUS at 12:04

## 2021-08-21 RX ADMIN — CEFTRIAXONE 100 MILLIGRAM(S): 500 INJECTION, POWDER, FOR SOLUTION INTRAMUSCULAR; INTRAVENOUS at 21:05

## 2021-08-21 RX ADMIN — SIMVASTATIN 10 MILLIGRAM(S): 20 TABLET, FILM COATED ORAL at 21:05

## 2021-08-21 RX ADMIN — AMIODARONE HYDROCHLORIDE 100 MILLIGRAM(S): 400 TABLET ORAL at 05:36

## 2021-08-21 RX ADMIN — AMIODARONE HYDROCHLORIDE 100 MILLIGRAM(S): 400 TABLET ORAL at 17:56

## 2021-08-21 RX ADMIN — Medication 40 MILLIEQUIVALENT(S): at 17:56

## 2021-08-21 RX ADMIN — Medication 1 DROP(S): at 12:04

## 2021-08-21 RX ADMIN — Medication 3 MILLIGRAM(S): at 21:05

## 2021-08-21 RX ADMIN — Medication 25 MILLIGRAM(S): at 17:56

## 2021-08-21 NOTE — CONSULT NOTE ADULT - SUBJECTIVE AND OBJECTIVE BOX
[facility] NEPHROLOGY- CONSULTATION NOTE    80y Female with history of below presents with weakness. Nephrology consulted for hyponatremia.    Patient found to have hyponatremia on labs with low urine osm. Patient subsequently noted to have improving serum Na with overcorrection for which patient was given D5W and DDAVP with decrease in serum Na this morning. Patient remains NPO.    REVIEW OF SYSTEMS:  Gen: no changes in weight, + weakness  HEENT: no rhinorrhea  Neck: no sore throat  Cards: no chest pain  Resp: no dyspnea  GI: + nausea and vomiting resolved, no diarrhea  : no dysuria or hematuria, + urinary frequency  Vascular: no LE edema  Derm: no rashes  Neuro: no numbness/tingling    No Known Allergies      Home Medications Reviewed  Hospital Medications:   MEDICATIONS  (STANDING):  aMIOdarone    Tablet 100 milliGRAM(s) Oral two times a day  amLODIPine   Tablet 5 milliGRAM(s) Oral daily  apixaban 5 milliGRAM(s) Oral two times a day  cefTRIAXone   IVPB      cefTRIAXone   IVPB 1000 milliGRAM(s) IV Intermittent every 24 hours  dextrose 5%. 1000 milliLiter(s) (125 mL/Hr) IV Continuous <Continuous>  hydrALAZINE 25 milliGRAM(s) Oral two times a day  latanoprost 0.005% Ophthalmic Solution 1 Drop(s) Both EYES at bedtime  levothyroxine 50 MICROGram(s) Oral daily  propranolol 10 milliGRAM(s) Oral two times a day  simvastatin 10 milliGRAM(s) Oral at bedtime  timolol 0.5% Solution 1 Drop(s) Both EYES daily      PAST MEDICAL & SURGICAL HISTORY:  HTN (hypertension)    Atrial fibrillation    Hypothyroidism, adult    No significant past surgical history        FAMILY HISTORY:      SOCIAL HISTORY:  Denies toxic substance use     VITALS:  T(F): 98.4 (21 @ 07:00), Max: 99.2 (21 @ 17:08)  HR: 55 (21 @ 09:00)  BP: 148/62 (21 @ 09:00)  RR: 15 (21 @ 09:00)  SpO2: 98% (21 @ 09:00)  Wt(kg): --     @ 07:01  -   @ 07:00  --------------------------------------------------------  IN: 595 mL / OUT: 5007 mL / NET: -4412 mL     @ 07:01  -   @ 11:23  --------------------------------------------------------  IN: 375 mL / OUT: 175 mL / NET: 200 mL      Height (cm): 154.9 ( @ 22:37)  Weight (kg): 68.8 ( @ 22:37)  BMI (kg/m2): 28.7 ( @ 22:37)  BSA (m2): 1.68 ( @ 22:37)      PHYSICAL EXAM:  Gen: NAD, calm  HEENT: MMM  Neck: no JVD  Cards: RRR, +S1/S2, no M/G/R  Resp: CTA B/L  GI: soft, NT/ND, NABS  : no CVA tenderness, + ventura  Vascular: no LE edema B/L  Derm: no rashes  Neuro: non-focal      LABS:      127<L>  |  98  |  10  ----------------------------<  108<H>  3.9   |  22  |  0.68    Ca    8.6      21 Aug 2021 09:06  Phos  3.0       Mg     2.4         TPro  7.2  /  Alb  3.5  /  TBili  0.6  /  DBili      /  AST  19  /  ALT  22  /  AlkPhos  80      Creatinine Trend: 0.68 <--, 0.64 <--, 0.58 <--, 0.53 <--                        11.0   6.39  )-----------( 327      ( 21 Aug 2021 04:45 )             31.4     Urine Studies:  Urinalysis Basic - ( 20 Aug 2021 20:50 )    Color: Yellow / Appearance: Slightly Turbid / S.010 / pH:   Gluc:  / Ketone: Trace  / Bili: Negative / Urobili: Negative   Blood:  / Protein: 15 / Nitrite: Negative   Leuk Esterase: Moderate / RBC: 2-5 /HPF / WBC 6-10 /HPF   Sq Epi:  / Non Sq Epi: Moderate /HPF / Bacteria: Moderate /HPF      Calcium, Random Urine: 5.4 mg/dL ( @ 03:08)  Osmolality, Random Urine: 81 mos/kg ( @ 23:57)  Sodium, Random Urine: 21 mmol/L ( @ 23:57)  Creatinine, Random Urine: <13 mg/dL ( @ 23:57)  Osmolality, Random Urine: 184 mos/kg ( @ 20:50)  Sodium, Random Urine: 35 mmol/L ( @ 20:50)  Chloride, Random Urine: 35 mmol/L ( @ 20:50)  Creatinine, Random Urine: <13 mg/dL ( @ 20:50)  Potassium, Random Urine: 18 mmol/L ( @ 20:50)      RADIOLOGY & ADDITIONAL STUDIES:    < from: CT Head No Cont (21 @ 22:29) >  IMPRESSION:    No acute intracranial hemorrhage, large cortical infarct or mass effect. If clinically indicated, short-term follow-up or MRI may be obtained for further evaluation.    < end of copied text >

## 2021-08-21 NOTE — CHART NOTE - NSCHARTNOTEFT_GEN_A_CORE
Intern spoke to patient's daughter (Anna). Update on clinical status was given and all questions regarding labs and imaging were answered. Daughter expressed desire to visit patient however, she is not vaccinated and getting a PCR test would take too long. Intern informed daughter that she can facetime the patient via the ICU's iPads. Anna will call ICU to Facetime with her mother later today.

## 2021-08-21 NOTE — CONSULT NOTE ADULT - ASSESSMENT
80y Female with history of HTN, afib presents with weakness. Nephrology consulted for hyponatremia.    1) Hyponatremia: Hypotonic hyponatremia likely due to increased ADH state from nausea and vomiting which has now resolved given low urine osm with increased UO leading to overcorrection overnight for which patient was relowered with D5W and DDAVP. Repeat serial Na with goal Na 123 this evening. Monitor UO and if increases, would give additional DDAVP. Monitor serum Na.    2) HTN: BP controlled with bradycardia. Can consider discontinuation of beta-blocker. Monitor BP.    3) Acute cystitis with hematuria: Urine culture not convincing of UTI however given symptoms, reasonable to continue with CTX and follow up pending urine culture.      [facility] NEPHROLOGY  Manuel Clay M.D.  Mark Slaughter D.O.  Marj Benoit M.D.  Shamika Lopez, MSN, ANP-C    Telephone: [phone]  Facsimile: [phone]    [address] [place] [address]

## 2021-08-21 NOTE — PROGRESS NOTE ADULT - ATTENDING COMMENTS
IMP: This is an 80 year old woman  with  HTN, HLD , Hypothyroidism, afib ? ( Patient on  amiodarone and Eliquis)  was brought to ED by daughter for progressive weakness and lethargy. Patient being admitted to ICU for sever hyponatremia.    Assessment:     - Severe Hyponatremia ( Hypovolemic Vs SIADH)  - Anemia  - UTI  - HTN  - Afib  - Hypothyroidism        Plan ;  -Continue to monitor mental /neurological status  -No sedation   -Diet   -Neph eval noted   -Na is better   -Continue ivf   -Dvt/Gi proph  -DVT/Gi prophy  -Monitor BMP q4h  -Antibx   -F/u cultures

## 2021-08-21 NOTE — PROGRESS NOTE ADULT - SUBJECTIVE AND OBJECTIVE BOX
Patient is a 80y old  Female who presents with a chief complaint of Severe Hyponatremia (20 Aug 2021 21:43)    INTERVAL HISTORY/ OVERNIGHT EVENTS: Patient was examined while she was lying in bed, Aox3, responding to questions/commands w/ [place] citizen , in NAD on RA. She denies any overnight symptoms/ complaints. Her sodium overcorrected after getting NS bolus in the ER from 115 to 124, and even after starting D5W at a low rate x 6 hours, corrected again to 132. She continues to be asymptomatic, but was given desmopressin.     PRESSORS: [ ] YES [x ] NO  WHICH:    ANTIBIOTICS:                  DATE STARTED:  ANTIBIOTICS:                  DATE STARTED:  ANTIBIOTICS:                  DATE STARTED:    Antimicrobial:  cefTRIAXone   IVPB      cefTRIAXone   IVPB 1000 milliGRAM(s) IV Intermittent every 24 hours    Cardiovascular:  aMIOdarone    Tablet 100 milliGRAM(s) Oral two times a day  amLODIPine   Tablet 5 milliGRAM(s) Oral daily  hydrALAZINE 25 milliGRAM(s) Oral two times a day  propranolol 10 milliGRAM(s) Oral two times a day    Pulmonary:    Hematalogic:  apixaban 5 milliGRAM(s) Oral two times a day    Other:  dextrose 5%. 1000 milliLiter(s) IV Continuous <Continuous>  latanoprost 0.005% Ophthalmic Solution 1 Drop(s) Both EYES at bedtime  levothyroxine 50 MICROGram(s) Oral daily  simvastatin 10 milliGRAM(s) Oral at bedtime  timolol 0.5% Solution 1 Drop(s) Both EYES daily    aMIOdarone    Tablet 100 milliGRAM(s) Oral two times a day  amLODIPine   Tablet 5 milliGRAM(s) Oral daily  apixaban 5 milliGRAM(s) Oral two times a day  cefTRIAXone   IVPB      cefTRIAXone   IVPB 1000 milliGRAM(s) IV Intermittent every 24 hours  dextrose 5%. 1000 milliLiter(s) IV Continuous <Continuous>  hydrALAZINE 25 milliGRAM(s) Oral two times a day  latanoprost 0.005% Ophthalmic Solution 1 Drop(s) Both EYES at bedtime  levothyroxine 50 MICROGram(s) Oral daily  propranolol 10 milliGRAM(s) Oral two times a day  simvastatin 10 milliGRAM(s) Oral at bedtime  timolol 0.5% Solution 1 Drop(s) Both EYES daily    Drug Dosing Weight  Height (cm): 154.9 (20 Aug 2021 22:37)  Weight (kg): 68.8 (20 Aug 2021 22:37)  BMI (kg/m2): 28.7 (20 Aug 2021 22:37)  BSA (m2): 1.68 (20 Aug 2021 22:37)    CENTRAL LINE: [ ] YES [ ] NO  LOCATION:     ARROYO: [ ] YES [ ] NO      A-LINE:  [ ] YES [ ] NO  LOCATION:         ICU Vital Signs Last 24 Hrs  T(C): 37 (21 Aug 2021 03:13), Max: 37.3 (20 Aug 2021 17:08)  T(F): 98.6 (21 Aug 2021 03:13), Max: 99.2 (20 Aug 2021 17:08)  HR: 58 (21 Aug 2021 04:00) (55 - 62)  BP: 118/50 (21 Aug 2021 04:00) (110/52 - 161/71)  BP(mean): 66 (21 Aug 2021 04:00) (60 - 79)  ABP: --  ABP(mean): --  RR: 15 (21 Aug 2021 04:00) (15 - 23)  SpO2: 95% (21 Aug 2021 04:00) (95% - 98%)                    PHYSICAL EXAM:    GENERAL: NAD, well-groomed female  EYES: EOMI, PERRLA,   NECK: Supple, No JVD; Normal thyroid; Trachea midline  NERVOUS SYSTEM:  Alert & Oriented X3,  Motor Strength 5/5 B/L upper and lower extremities; DTRs 2+ intact and symmetric  CHEST/LUNG: No rales, rhonchi, wheezing   HEART: Regular rate and rhythm; No murmurs,   ABDOMEN: Soft, Nontender, Nondistended; Bowel sounds present  EXTREMITIES:  2+ Peripheral Pulses, No clubbing, cyanosis, or edema      LABS:                        11.0   6.39  )-----------( 327      ( 21 Aug 2021 04:45 )             31.4     08-21    132<L>  |  99  |  9   ----------------------------<  105<H>  3.5   |  25  |  0.64    Ca    8.7      21 Aug 2021 04:45  Phos  3.0     08-21  Mg     2.4     08-21    TPro  7.2  /  Alb  3.5  /  TBili  0.6  /  DBili  x   /  AST  19  /  ALT  22  /  AlkPhos  80  08-20      Urinalysis Basic - ( 20 Aug 2021 20:50 )    Color: Yellow / Appearance: Slightly Turbid / S.010 / pH: x  Gluc: x / Ketone: Trace  / Bili: Negative / Urobili: Negative   Blood: x / Protein: 15 / Nitrite: Negative   Leuk Esterase: Moderate / RBC: 2-5 /HPF / WBC 6-10 /HPF   Sq Epi: x / Non Sq Epi: Moderate /HPF / Bacteria: Moderate /HPF      CAPILLARY BLOOD GLUCOSE            RADIOLOGY & ADDITIONAL STUDIES REVIEWED:  ***    [ ]GOALS OF CARE DISCUSSION WITH PATIENT/FAMILY/PROXY:    CRITICAL CARE TIME SPENT: 35 minutes Patient is a 80y old  Female who presents with a chief complaint of Severe Hyponatremia (20 Aug 2021 21:43)    INTERVAL HISTORY/ OVERNIGHT EVENTS: Patient was seen and examined at bedside. [place]  used ([id number]) AAOx3, responding to questions/commands. In NAD on RA. She denies any overnight symptoms/ complaints. Her sodium overcorrected overnight after getting NS bolus in the ER from 115 to 124 , and even after starting D5W at a low rate x 6 hours, corrected again to 132. She continues to be asymptomatic, but was given desmopressin. [facility] nephrology following. Rocephin initiated d/t UTI.    PRESSORS: [ ] YES [x ] NO  WHICH:    ANTIBIOTICS:                  DATE STARTED:  ANTIBIOTICS:                  DATE STARTED:  ANTIBIOTICS:                  DATE STARTED:    Antimicrobial:  cefTRIAXone   IVPB      cefTRIAXone   IVPB 1000 milliGRAM(s) IV Intermittent every 24 hours    Cardiovascular:  aMIOdarone    Tablet 100 milliGRAM(s) Oral two times a day  amLODIPine   Tablet 5 milliGRAM(s) Oral daily  hydrALAZINE 25 milliGRAM(s) Oral two times a day  propranolol 10 milliGRAM(s) Oral two times a day    Pulmonary:    Hematalogic:  apixaban 5 milliGRAM(s) Oral two times a day    Other:  dextrose 5%. 1000 milliLiter(s) IV Continuous <Continuous>  latanoprost 0.005% Ophthalmic Solution 1 Drop(s) Both EYES at bedtime  levothyroxine 50 MICROGram(s) Oral daily  simvastatin 10 milliGRAM(s) Oral at bedtime  timolol 0.5% Solution 1 Drop(s) Both EYES daily    aMIOdarone    Tablet 100 milliGRAM(s) Oral two times a day  amLODIPine   Tablet 5 milliGRAM(s) Oral daily  apixaban 5 milliGRAM(s) Oral two times a day  cefTRIAXone   IVPB      cefTRIAXone   IVPB 1000 milliGRAM(s) IV Intermittent every 24 hours  dextrose 5%. 1000 milliLiter(s) IV Continuous <Continuous>  hydrALAZINE 25 milliGRAM(s) Oral two times a day  latanoprost 0.005% Ophthalmic Solution 1 Drop(s) Both EYES at bedtime  levothyroxine 50 MICROGram(s) Oral daily  propranolol 10 milliGRAM(s) Oral two times a day  simvastatin 10 milliGRAM(s) Oral at bedtime  timolol 0.5% Solution 1 Drop(s) Both EYES daily    Drug Dosing Weight  Height (cm): 154.9 (20 Aug 2021 22:37)  Weight (kg): 68.8 (20 Aug 2021 22:37)  BMI (kg/m2): 28.7 (20 Aug 2021 22:37)  BSA (m2): 1.68 (20 Aug 2021 22:37)    CENTRAL LINE: [ ] YES [ ] NO  LOCATION:     ARROYO: [ ] YES [ ] NO      A-LINE:  [ ] YES [ ] NO  LOCATION:         ICU Vital Signs Last 24 Hrs  T(C): 37 (21 Aug 2021 03:13), Max: 37.3 (20 Aug 2021 17:08)  T(F): 98.6 (21 Aug 2021 03:13), Max: 99.2 (20 Aug 2021 17:08)  HR: 58 (21 Aug 2021 04:00) (55 - 62)  BP: 118/50 (21 Aug 2021 04:00) (110/52 - 161/71)  BP(mean): 66 (21 Aug 2021 04:00) (60 - 79)  ABP: --  ABP(mean): --  RR: 15 (21 Aug 2021 04:00) (15 - 23)  SpO2: 95% (21 Aug 2021 04:00) (95% - 98%)      PHYSICAL EXAM:    GENERAL: NAD, well-groomed female  EYES: EOMI, PERRLA,   NECK: Supple, No JVD; Normal thyroid; Trachea midline  NERVOUS SYSTEM:  Alert & Oriented X3,  Motor Strength 5/5 B/L upper and lower extremities; DTRs 2+ intact and symmetric  CHEST/LUNG: No rales, rhonchi, wheezing   HEART: Regular rate and rhythm; No murmurs,   ABDOMEN: Soft, Nontender, Nondistended; Bowel sounds present  EXTREMITIES:  2+ Peripheral Pulses, No clubbing, cyanosis, or edema      LABS:                        11.0   6.39  )-----------( 327      ( 21 Aug 2021 04:45 )             31.4     08-21    132<L>  |  99  |  9   ----------------------------<  105<H>  3.5   |  25  |  0.64    Ca    8.7      21 Aug 2021 04:45  Phos  3.0     08-  Mg     2.4     08-    TPro  7.2  /  Alb  3.5  /  TBili  0.6  /  DBili  x   /  AST  19  /  ALT  22  /  AlkPhos  80  08-20      Urinalysis Basic - ( 20 Aug 2021 20:50 )    Color: Yellow / Appearance: Slightly Turbid / S.010 / pH: x  Gluc: x / Ketone: Trace  / Bili: Negative / Urobili: Negative   Blood: x / Protein: 15 / Nitrite: Negative   Leuk Esterase: Moderate / RBC: 2-5 /HPF / WBC 6-10 /HPF   Sq Epi: x / Non Sq Epi: Moderate /HPF / Bacteria: Moderate /HPF      CAPILLARY BLOOD GLUCOSE      RADIOLOGY & ADDITIONAL STUDIES REVIEWED:  ***    [ ]GOALS OF CARE DISCUSSION WITH PATIENT/FAMILY/PROXY:    CRITICAL CARE TIME SPENT: 35 minutes

## 2021-08-21 NOTE — PROGRESS NOTE ADULT - ASSESSMENT
80 year old female with medical hx of HTN, HLD , Hypothyroidism, afib ? ( Patient on  amiodarone and Eliquis)  was brought to ED by daughter for progressive weakness and lethargy. Patient being admitted to ICU for sever hyponatremia.      Assessment:       - Severe Hyponatremia ( Hypovolemic Vs SIADH)  - Anemia  - UTI  - HTN  - Afib  - Hypothyroidism  -         Neuro:    Patient is AAO X 2.  Will get  CT head.        Cardio:    Afib    Patient with Afib on Amiodarone and Eliquis will continue with home meds    HTN    Patient on Amlodipine and hydralazine will continue with home meds        Pulm:    no acute issues.  CXR clear      GI:    Vomiting:  Zpfran prn for vomiting.  NPO except meds.  will start on diet once stable        Renal:    Sever Hyponatremia:    Patients serum na noted to be 115.  s/p 900 ml in ED.  urine lytes were sent after fluids.   urine Na 35, Os[place]ity 180  serum osm low  likely hypovolemic in setting of vomiting and incraesed urination Vs SIADH.  BMP q4.  check TSH and Cortisol  monitor electrolytes.  Avoid over correction. goal of correction 6-8 meq in 24 hrs.        Endo:    Patient with hypothyroidism,    cw levothyroxine  follow TSH  Follow A1c.      ID:    UTI  Patient with positive UA and incraesed frequency.  Will start Rocephin   Follow Urine Cx.        Catheters    Zendejas Catheter placed in ED 8/20        DVT PPx:    Eliquis for Dvt ppx    Dispo:  Will Admit the patient to ICU for severe hyponatremia                   80 year old female with medical hx of HTN, HLD , Hypothyroidism, afib ? ( Patient on  amiodarone and Eliquis)  was brought to ED by daughter for progressive weakness and lethargy. Patient being admitted to ICU for sever hyponatremia.    Assessment:     - Severe Hyponatremia ( Hypovolemic Vs SIADH)  - Anemia  - UTI  - HTN  - Afib  - Hypothyroidism      Neuro:  Patient was AAO X 2 on admission  -CT head neg.  -curently AAOx3    Cardio:  #Afib  Patient with Afib on Amiodarone and Eliquis will continue with home meds    #HTN  Patient on Amlodipine and hydralazine will continue with home meds    Pulm:  -no acute issues.  -CXR clear    GI:  -p/w Vomiting:  -Zofran prn for vomiting.  -was put NPO except meds.  -started on pureed diet in AM, aspiration precautions    Renal:  #Severe Hyponatremia:  -Patients serum na noted to be 115 > 124> 132 (overcorrected) > 127  -s/p 900 ml in ED.  -urine lytes were sent after fluids.   -urine Na 35, Osmlolaity 180  -serum osm low  -likely hypovolemic in setting of vomiting and increased urination Vs SIADH.  -BMP q4.  -TSH and Cortisol wnl  - given 1 dose DDAVP  -monitor BMP.  Nephro Slaughter following    Endo:  #hx of hypothyroidism,  -cw levothyroxine  -TSH wnl  -Follow A1c.    ID:  #UTI  -Patient with positive UA and increased frequency.  -Will started rocephin  -Follow Urine Cx.    Catheters  -Zendejas Catheter placed in ED 8/20    DVT PPx:  -Eliquis for Dvt ppx    Dispo:  Will Admit the patient to ICU for severe hyponatremia

## 2021-08-22 LAB
ALBUMIN SERPL ELPH-MCNC: 3.1 G/DL — LOW (ref 3.5–5)
ALP SERPL-CCNC: 71 U/L — SIGNIFICANT CHANGE UP (ref 40–120)
ALT FLD-CCNC: 20 U/L DA — SIGNIFICANT CHANGE UP (ref 10–60)
ANION GAP SERPL CALC-SCNC: 5 MMOL/L — SIGNIFICANT CHANGE UP (ref 5–17)
ANION GAP SERPL CALC-SCNC: 6 MMOL/L — SIGNIFICANT CHANGE UP (ref 5–17)
ANION GAP SERPL CALC-SCNC: 7 MMOL/L — SIGNIFICANT CHANGE UP (ref 5–17)
ANION GAP SERPL CALC-SCNC: 8 MMOL/L — SIGNIFICANT CHANGE UP (ref 5–17)
ANION GAP SERPL CALC-SCNC: 9 MMOL/L — SIGNIFICANT CHANGE UP (ref 5–17)
AST SERPL-CCNC: 14 U/L — SIGNIFICANT CHANGE UP (ref 10–40)
BILIRUB SERPL-MCNC: 0.4 MG/DL — SIGNIFICANT CHANGE UP (ref 0.2–1.2)
BUN SERPL-MCNC: 10 MG/DL — SIGNIFICANT CHANGE UP (ref 7–18)
BUN SERPL-MCNC: 10 MG/DL — SIGNIFICANT CHANGE UP (ref 7–18)
BUN SERPL-MCNC: 13 MG/DL — SIGNIFICANT CHANGE UP (ref 7–18)
BUN SERPL-MCNC: 14 MG/DL — SIGNIFICANT CHANGE UP (ref 7–18)
BUN SERPL-MCNC: 9 MG/DL — SIGNIFICANT CHANGE UP (ref 7–18)
CALCIUM SERPL-MCNC: 8.3 MG/DL — LOW (ref 8.4–10.5)
CALCIUM SERPL-MCNC: 8.4 MG/DL — SIGNIFICANT CHANGE UP (ref 8.4–10.5)
CALCIUM SERPL-MCNC: 8.6 MG/DL — SIGNIFICANT CHANGE UP (ref 8.4–10.5)
CALCIUM SERPL-MCNC: 8.7 MG/DL — SIGNIFICANT CHANGE UP (ref 8.4–10.5)
CALCIUM SERPL-MCNC: 8.7 MG/DL — SIGNIFICANT CHANGE UP (ref 8.4–10.5)
CHLORIDE SERPL-SCNC: 92 MMOL/L — LOW (ref 96–108)
CHLORIDE SERPL-SCNC: 92 MMOL/L — LOW (ref 96–108)
CHLORIDE SERPL-SCNC: 94 MMOL/L — LOW (ref 96–108)
CHLORIDE SERPL-SCNC: 96 MMOL/L — SIGNIFICANT CHANGE UP (ref 96–108)
CHLORIDE SERPL-SCNC: 98 MMOL/L — SIGNIFICANT CHANGE UP (ref 96–108)
CO2 SERPL-SCNC: 23 MMOL/L — SIGNIFICANT CHANGE UP (ref 22–31)
CO2 SERPL-SCNC: 24 MMOL/L — SIGNIFICANT CHANGE UP (ref 22–31)
CO2 SERPL-SCNC: 26 MMOL/L — SIGNIFICANT CHANGE UP (ref 22–31)
CREAT SERPL-MCNC: 0.52 MG/DL — SIGNIFICANT CHANGE UP (ref 0.5–1.3)
CREAT SERPL-MCNC: 0.57 MG/DL — SIGNIFICANT CHANGE UP (ref 0.5–1.3)
CREAT SERPL-MCNC: 0.78 MG/DL — SIGNIFICANT CHANGE UP (ref 0.5–1.3)
CREAT SERPL-MCNC: 0.82 MG/DL — SIGNIFICANT CHANGE UP (ref 0.5–1.3)
CREAT SERPL-MCNC: 0.87 MG/DL — SIGNIFICANT CHANGE UP (ref 0.5–1.3)
CULTURE RESULTS: SIGNIFICANT CHANGE UP
GLUCOSE SERPL-MCNC: 100 MG/DL — HIGH (ref 70–99)
GLUCOSE SERPL-MCNC: 123 MG/DL — HIGH (ref 70–99)
GLUCOSE SERPL-MCNC: 136 MG/DL — HIGH (ref 70–99)
GLUCOSE SERPL-MCNC: 96 MG/DL — SIGNIFICANT CHANGE UP (ref 70–99)
GLUCOSE SERPL-MCNC: 98 MG/DL — SIGNIFICANT CHANGE UP (ref 70–99)
HCT VFR BLD CALC: 32.5 % — LOW (ref 34.5–45)
HGB BLD-MCNC: 11.3 G/DL — LOW (ref 11.5–15.5)
MAGNESIUM SERPL-MCNC: 2 MG/DL — SIGNIFICANT CHANGE UP (ref 1.6–2.6)
MCHC RBC-ENTMCNC: 29.8 PG — SIGNIFICANT CHANGE UP (ref 27–34)
MCHC RBC-ENTMCNC: 34.8 GM/DL — SIGNIFICANT CHANGE UP (ref 32–36)
MCV RBC AUTO: 85.8 FL — SIGNIFICANT CHANGE UP (ref 80–100)
NRBC # BLD: 0 /100 WBCS — SIGNIFICANT CHANGE UP (ref 0–0)
PHOSPHATE SERPL-MCNC: 2.7 MG/DL — SIGNIFICANT CHANGE UP (ref 2.5–4.5)
PLATELET # BLD AUTO: 327 K/UL — SIGNIFICANT CHANGE UP (ref 150–400)
POTASSIUM SERPL-MCNC: 3.5 MMOL/L — SIGNIFICANT CHANGE UP (ref 3.5–5.3)
POTASSIUM SERPL-MCNC: 4 MMOL/L — SIGNIFICANT CHANGE UP (ref 3.5–5.3)
POTASSIUM SERPL-MCNC: 4.1 MMOL/L — SIGNIFICANT CHANGE UP (ref 3.5–5.3)
POTASSIUM SERPL-MCNC: 4.1 MMOL/L — SIGNIFICANT CHANGE UP (ref 3.5–5.3)
POTASSIUM SERPL-MCNC: 4.3 MMOL/L — SIGNIFICANT CHANGE UP (ref 3.5–5.3)
POTASSIUM SERPL-SCNC: 3.5 MMOL/L — SIGNIFICANT CHANGE UP (ref 3.5–5.3)
POTASSIUM SERPL-SCNC: 4 MMOL/L — SIGNIFICANT CHANGE UP (ref 3.5–5.3)
POTASSIUM SERPL-SCNC: 4.1 MMOL/L — SIGNIFICANT CHANGE UP (ref 3.5–5.3)
POTASSIUM SERPL-SCNC: 4.1 MMOL/L — SIGNIFICANT CHANGE UP (ref 3.5–5.3)
POTASSIUM SERPL-SCNC: 4.3 MMOL/L — SIGNIFICANT CHANGE UP (ref 3.5–5.3)
PROT SERPL-MCNC: 6.7 G/DL — SIGNIFICANT CHANGE UP (ref 6–8.3)
RBC # BLD: 3.79 M/UL — LOW (ref 3.8–5.2)
RBC # FLD: 13 % — SIGNIFICANT CHANGE UP (ref 10.3–14.5)
SODIUM SERPL-SCNC: 123 MMOL/L — LOW (ref 135–145)
SODIUM SERPL-SCNC: 125 MMOL/L — LOW (ref 135–145)
SODIUM SERPL-SCNC: 126 MMOL/L — LOW (ref 135–145)
SODIUM SERPL-SCNC: 127 MMOL/L — LOW (ref 135–145)
SODIUM SERPL-SCNC: 131 MMOL/L — LOW (ref 135–145)
SPECIMEN SOURCE: SIGNIFICANT CHANGE UP
WBC # BLD: 7.09 K/UL — SIGNIFICANT CHANGE UP (ref 3.8–10.5)
WBC # FLD AUTO: 7.09 K/UL — SIGNIFICANT CHANGE UP (ref 3.8–10.5)

## 2021-08-22 RX ORDER — SODIUM CHLORIDE 9 MG/ML
1000 INJECTION INTRAMUSCULAR; INTRAVENOUS; SUBCUTANEOUS
Refills: 0 | Status: DISCONTINUED | OUTPATIENT
Start: 2021-08-22 | End: 2021-08-22

## 2021-08-22 RX ADMIN — Medication 25 MILLIGRAM(S): at 05:04

## 2021-08-22 RX ADMIN — LATANOPROST 1 DROP(S): 0.05 SOLUTION/ DROPS OPHTHALMIC; TOPICAL at 22:06

## 2021-08-22 RX ADMIN — APIXABAN 5 MILLIGRAM(S): 2.5 TABLET, FILM COATED ORAL at 17:41

## 2021-08-22 RX ADMIN — APIXABAN 5 MILLIGRAM(S): 2.5 TABLET, FILM COATED ORAL at 05:04

## 2021-08-22 RX ADMIN — SIMVASTATIN 10 MILLIGRAM(S): 20 TABLET, FILM COATED ORAL at 22:06

## 2021-08-22 RX ADMIN — SODIUM CHLORIDE 50 MILLILITER(S): 9 INJECTION INTRAMUSCULAR; INTRAVENOUS; SUBCUTANEOUS at 22:06

## 2021-08-22 RX ADMIN — AMIODARONE HYDROCHLORIDE 100 MILLIGRAM(S): 400 TABLET ORAL at 05:04

## 2021-08-22 RX ADMIN — AMIODARONE HYDROCHLORIDE 100 MILLIGRAM(S): 400 TABLET ORAL at 17:41

## 2021-08-22 RX ADMIN — CEFTRIAXONE 100 MILLIGRAM(S): 500 INJECTION, POWDER, FOR SOLUTION INTRAMUSCULAR; INTRAVENOUS at 22:06

## 2021-08-22 RX ADMIN — AMLODIPINE BESYLATE 5 MILLIGRAM(S): 2.5 TABLET ORAL at 05:04

## 2021-08-22 RX ADMIN — Medication 50 MICROGRAM(S): at 05:04

## 2021-08-22 RX ADMIN — Medication 1 DROP(S): at 11:13

## 2021-08-22 NOTE — PROGRESS NOTE ADULT - ASSESSMENT
80y Female with history of HTN, afib presents with weakness. Nephrology consulted for hyponatremia.    1) Hyponatremia: Hypotonic hyponatremia likely due to increased ADH state from nausea and vomiting which has now resolved with overcorrection s/p D5W and DDAVP. Na now improving on NS with dilute urine. Please monitor for overcorrection. Goal serum Na 131 on 8/23 AM. Monitor serum Na and do not correct more than 8 meQ/day.    2) HTN: BP controlled with bradycardia. Can consider discontinuation of beta-blocker. Monitor BP.    3) Acute cystitis with hematuria: Urine culture not convincing of UTI however given symptoms, reasonable to continue with CTX and follow up pending urine culture.      [facility] NEPHROLOGY  Manuel Clay M.D.  Mark Slaughter D.O.  Marj Benoit M.D.  Shamika Lopez, MSN, ANP-C    Telephone: [phone]  Facsimile: [phone]    [address] [place] [address]

## 2021-08-22 NOTE — PROGRESS NOTE ADULT - SUBJECTIVE AND OBJECTIVE BOX
[facility] NEPHROLOGY- PROGRESS NOTE    80y Female with history of HTN, afib presents with weakness. Nephrology consulted for hyponatremia.    REVIEW OF SYSTEMS:  Gen: no changes in weight, + weakness  Cards: no chest pain  Resp: no dyspnea  GI: no nausea or vomiting or diarrhea  Vascular: no LE edema    No Known Allergies      Hospital Medications: Medications reviewed    VITALS:  T(F): 96.7 (21 @ 07:00), Max: 98.6 (21 @ 12:00)  HR: 58 (21 @ 11:00)  BP: 115/54 (21 @ 11:00)  RR: 14 (21 @ 11:00)  SpO2: 97% (21 @ 11:00)  Wt(kg): --  Height (cm): 154.9 ( 22:37)  Weight (kg): 68.8 (:37)  BMI (kg/m2): 28.7 (:37)  BSA (m2): 1.68 ( 22:37)     @ 07:01  -   @ 07:00  --------------------------------------------------------  IN: 1925 mL / OUT: 1925 mL / NET: 0 mL     @ 07:01  -   @ 11:14  --------------------------------------------------------  IN: 0 mL / OUT: 275 mL / NET: -275 mL      PHYSICAL EXAM:    Gen: NAD, calm  Cards: RRR, +S1/S2, no M/G/R  Resp: CTA B/L  GI: soft, NT/ND, NABS  : + ventura with dilute urine  Vascular: no LE edema B/L      LABS:      126<L>  |  92<L>  |  10  ----------------------------<  123<H>  3.5   |  26  |  0.82    Ca    8.7      22 Aug 2021 09:25  Phos  2.7       Mg     2.0         TPro  6.7  /  Alb  3.1<L>  /  TBili  0.4  /  DBili      /  AST  14  /  ALT  20  /  AlkPhos  71      Creatinine Trend: 0.82 <--, 0.52 <--, 0.57 <--, 0.58 <--, 0.66 <--, 0.68 <--, 0.68 <--, 0.64 <--, 0.58 <--, 0.53 <--                        11.3   7.09  )-----------( 327      ( 22 Aug 2021 04:27 )             32.5     Urine Studies:  Urinalysis Basic - ( 20 Aug 2021 20:50 )    Color: Yellow / Appearance: Slightly Turbid / S.010 / pH:   Gluc:  / Ketone: Trace  / Bili: Negative / Urobili: Negative   Blood:  / Protein: 15 / Nitrite: Negative   Leuk Esterase: Moderate / RBC: 2-5 /HPF / WBC 6-10 /HPF   Sq Epi:  / Non Sq Epi: Moderate /HPF / Bacteria: Moderate /HPF      Osmolality, Random Urine: 440 mos/kg ( @ 20:12)  Sodium, Random Urine: 38 mmol/L ( @ 20:12)  Calcium, Random Urine: 5.4 mg/dL ( @ 03:08)  Osmolality, Random Urine: 81 mos/kg ( @ 23:57)  Sodium, Random Urine: 21 mmol/L ( @ 23:57)  Creatinine, Random Urine: <13 mg/dL ( @ 23:57)  Osmolality, Random Urine: 184 mos/kg ( @ 20:50)  Sodium, Random Urine: 35 mmol/L ( @ 20:50)  Chloride, Random Urine: 35 mmol/L ( @ 20:50)  Creatinine, Random Urine: <13 mg/dL ( @ 20:50)  Potassium, Random Urine: 18 mmol/L ( @ 20:50)      RADIOLOGY & ADDITIONAL STUDIES:

## 2021-08-22 NOTE — PROGRESS NOTE ADULT - SUBJECTIVE AND OBJECTIVE BOX
INTERVAL HPI/OVERNIGHT EVENTS: ***    PRESSORS: [ ] YES [ ] NO  WHICH:    Antimicrobial:  cefTRIAXone   IVPB      cefTRIAXone   IVPB 1000 milliGRAM(s) IV Intermittent every 24 hours    Cardiovascular:  aMIOdarone    Tablet 100 milliGRAM(s) Oral two times a day  amLODIPine   Tablet 5 milliGRAM(s) Oral daily  hydrALAZINE 25 milliGRAM(s) Oral two times a day  propranolol 10 milliGRAM(s) Oral two times a day    Pulmonary:    Hematalogic:  apixaban 5 milliGRAM(s) Oral two times a day    Other:  latanoprost 0.005% Ophthalmic Solution 1 Drop(s) Both EYES at bedtime  levothyroxine 50 MICROGram(s) Oral daily  simvastatin 10 milliGRAM(s) Oral at bedtime  timolol 0.5% Solution 1 Drop(s) Both EYES daily    aMIOdarone    Tablet 100 milliGRAM(s) Oral two times a day  amLODIPine   Tablet 5 milliGRAM(s) Oral daily  apixaban 5 milliGRAM(s) Oral two times a day  cefTRIAXone   IVPB      cefTRIAXone   IVPB 1000 milliGRAM(s) IV Intermittent every 24 hours  hydrALAZINE 25 milliGRAM(s) Oral two times a day  latanoprost 0.005% Ophthalmic Solution 1 Drop(s) Both EYES at bedtime  levothyroxine 50 MICROGram(s) Oral daily  propranolol 10 milliGRAM(s) Oral two times a day  simvastatin 10 milliGRAM(s) Oral at bedtime  timolol 0.5% Solution 1 Drop(s) Both EYES daily    Drug Dosing Weight  Height (cm): 154.9 (20 Aug 2021 22:37)  Weight (kg): 68.8 (20 Aug 2021 22:37)  BMI (kg/m2): 28.7 (20 Aug 2021 22:37)  BSA (m2): 1.68 (20 Aug 2021 22:37)    CENTRAL LINE: [ ] YES [ ] NO  LOCATION:         ARORYO: [ ] YES [ ] NO          A-LINE:  [ ] YES [ ] NO  LOCATION:             ICU Vital Signs Last 24 Hrs  T(C): 36.9 (21 Aug 2021 23:04), Max: 37 (21 Aug 2021 03:13)  T(F): 98.4 (21 Aug 2021 23:04), Max: 98.6 (21 Aug 2021 03:13)  HR: 51 (22 Aug 2021 02:00) (49 - 61)  BP: 139/64 (22 Aug 2021 02:00) (93/47 - 148/62)  BP(mean): 82 (22 Aug 2021 02:00) (57 - 84)  ABP: --  ABP(mean): --  RR: 18 (22 Aug 2021 02:00) (12 - 20)  SpO2: 96% (22 Aug 2021 02:00) (91% - 98%)             @ 07:01  -  08- @ 07:00  --------------------------------------------------------  IN: 595 mL / OUT: 5007 mL / NET: -4412 mL              PHYSICAL EXAM:    GENERAL: NAD, well-groomed, well-developed  EYES: EOMI, PERRLA,   NECK: Supple, No JVD; Normal thyroid; Trachea midline  NERVOUS SYSTEM:  Alert & Oriented X3,  Motor Strength 5/5 B/L upper and lower extremities; DTRs 2+ intact and symmetric  CHEST/LUNG: No rales, rhonchi, wheezing   HEART: Regular rate and rhythm; No murmurs,   ABDOMEN: Soft, Nontender, Nondistended; Bowel sounds present  EXTREMITIES:  2+ Peripheral Pulses, No clubbing, cyanosis, or edema        LABS:  CBC Full  -  ( 21 Aug 2021 04:45 )  WBC Count : 6.39 K/uL  RBC Count : 3.73 M/uL  Hemoglobin : 11.0 g/dL  Hematocrit : 31.4 %  Platelet Count - Automated : 327 K/uL  Mean Cell Volume : 84.2 fl  Mean Cell Hemoglobin : 29.5 pg  Mean Cell Hemoglobin Concentration : 35.0 gm/dL  Auto Neutrophil # : x  Auto Lymphocyte # : x  Auto Monocyte # : x  Auto Eosinophil # : x  Auto Basophil # : x  Auto Neutrophil % : x  Auto Lymphocyte % : x  Auto Monocyte % : x  Auto Eosinophil % : x  Auto Basophil % : x        125<L>  |  92<L>  |  10  ----------------------------<  98  4.1   |  24  |  0.57    Ca    8.3<L>      22 Aug 2021 00:15  Phos  3.0     08-  Mg     2.4     -    TPro  7.2  /  Alb  3.5  /  TBili  0.6  /  DBili  x   /  AST  19  /  ALT  22  /  AlkPhos  80  08-      Urinalysis Basic - ( 20 Aug 2021 20:50 )    Color: Yellow / Appearance: Slightly Turbid / S.010 / pH: x  Gluc: x / Ketone: Trace  / Bili: Negative / Urobili: Negative   Blood: x / Protein: 15 / Nitrite: Negative   Leuk Esterase: Moderate / RBC: 2-5 /HPF / WBC 6-10 /HPF   Sq Epi: x / Non Sq Epi: Moderate /HPF / Bacteria: Moderate /HPF          RADIOLOGY & ADDITIONAL STUDIES REVIEWED:  ***      CRITICAL CARE TIME SPENT: 35 minutes INTERVAL HPI/OVERNIGHT EVENTS: ***    Pt. examined at bedside. Given NS then D5 at 100mL/hr. Serum sodium continue to improve (115>132). Given 1 dose ddAVP. Serum sodium dropped fast (127) but eventually slowly trending up (126 at 9:30am). Goal serum sodium is 130 the next morning (). CT Head was unremarkable. No other significant events overnight.    PRESSORS: [ ] YES [X] NO  WHICH:    Antimicrobial:  cefTRIAXone   IVPB      cefTRIAXone   IVPB 1000 milliGRAM(s) IV Intermittent every 24 hours    Cardiovascular:  aMIOdarone    Tablet 100 milliGRAM(s) Oral two times a day  amLODIPine   Tablet 5 milliGRAM(s) Oral daily  hydrALAZINE 25 milliGRAM(s) Oral two times a day  propranolol 10 milliGRAM(s) Oral two times a day    Pulmonary:    Hematalogic:  apixaban 5 milliGRAM(s) Oral two times a day    Other:  latanoprost 0.005% Ophthalmic Solution 1 Drop(s) Both EYES at bedtime  levothyroxine 50 MICROGram(s) Oral daily  simvastatin 10 milliGRAM(s) Oral at bedtime  timolol 0.5% Solution 1 Drop(s) Both EYES daily    aMIOdarone    Tablet 100 milliGRAM(s) Oral two times a day  amLODIPine   Tablet 5 milliGRAM(s) Oral daily  apixaban 5 milliGRAM(s) Oral two times a day  cefTRIAXone   IVPB      cefTRIAXone   IVPB 1000 milliGRAM(s) IV Intermittent every 24 hours  hydrALAZINE 25 milliGRAM(s) Oral two times a day  latanoprost 0.005% Ophthalmic Solution 1 Drop(s) Both EYES at bedtime  levothyroxine 50 MICROGram(s) Oral daily  propranolol 10 milliGRAM(s) Oral two times a day  simvastatin 10 milliGRAM(s) Oral at bedtime  timolol 0.5% Solution 1 Drop(s) Both EYES daily    Drug Dosing Weight  Height (cm): 154.9 (20 Aug 2021 22:37)  Weight (kg): 68.8 (20 Aug 2021 22:37)  BMI (kg/m2): 28.7 (20 Aug 2021 22:37)  BSA (m2): 1.68 (20 Aug 2021 22:37)    CENTRAL LINE: [ ] YES [ ] NO  LOCATION:         ARROYO: [ ] YES [ ] NO          A-LINE:  [ ] YES [ ] NO  LOCATION:             ICU Vital Signs Last 24 Hrs  T(C): 36.9 (21 Aug 2021 23:04), Max: 37 (21 Aug 2021 03:13)  T(F): 98.4 (21 Aug 2021 23:04), Max: 98.6 (21 Aug 2021 03:13)  HR: 51 (22 Aug 2021 02:00) (49 - 61)  BP: 139/64 (22 Aug 2021 02:00) (93/47 - 148/62)  BP(mean): 82 (22 Aug 2021 02:00) (57 - 84)  ABP: --  ABP(mean): --  RR: 18 (22 Aug 2021 02:00) (12 - 20)  SpO2: 96% (22 Aug 2021 02:00) (91% - 98%)            08 @ 07:01  -  08- @ 07:00  --------------------------------------------------------  IN: 595 mL / OUT: 5007 mL / NET: -4412 mL              PHYSICAL EXAM:    GENERAL: NAD, well-groomed, well-developed  EYES: EOMI, PERRLA,   NECK: Supple, No JVD; Normal thyroid; Trachea midline  NERVOUS SYSTEM:  Alert & Oriented X3,  Motor Strength 5/5 B/L upper and lower extremities; DTRs 2+ intact and symmetric  CHEST/LUNG: No rales, rhonchi, wheezing   HEART: Regular rate and rhythm; No murmurs,   ABDOMEN: Soft, Nontender, Nondistended; Bowel sounds present  EXTREMITIES:  2+ Peripheral Pulses, No clubbing, cyanosis, or edema        LABS:  CBC Full  -  ( 21 Aug 2021 04:45 )  WBC Count : 6.39 K/uL  RBC Count : 3.73 M/uL  Hemoglobin : 11.0 g/dL  Hematocrit : 31.4 %  Platelet Count - Automated : 327 K/uL  Mean Cell Volume : 84.2 fl  Mean Cell Hemoglobin : 29.5 pg  Mean Cell Hemoglobin Concentration : 35.0 gm/dL  Auto Neutrophil # : x  Auto Lymphocyte # : x  Auto Monocyte # : x  Auto Eosinophil # : x  Auto Basophil # : x  Auto Neutrophil % : x  Auto Lymphocyte % : x  Auto Monocyte % : x  Auto Eosinophil % : x  Auto Basophil % : x        125<L>  |  92<L>  |  10  ----------------------------<  98  4.1   |  24  |  0.57    Ca    8.3<L>      22 Aug 2021 00:15  Phos  3.0       Mg     2.4         TPro  7.2  /  Alb  3.5  /  TBili  0.6  /  DBili  x   /  AST  19  /  ALT  22  /  AlkPhos  80        Urinalysis Basic - ( 20 Aug 2021 20:50 )    Color: Yellow / Appearance: Slightly Turbid / S.010 / pH: x  Gluc: x / Ketone: Trace  / Bili: Negative / Urobili: Negative   Blood: x / Protein: 15 / Nitrite: Negative   Leuk Esterase: Moderate / RBC: 2-5 /HPF / WBC 6-10 /HPF   Sq Epi: x / Non Sq Epi: Moderate /HPF / Bacteria: Moderate /HPF          RADIOLOGY & ADDITIONAL STUDIES REVIEWED:  ***    < from: CT Head No Cont (21 @ 22:29) >  FINDINGS: Artifact degrades images of the posterior fossa. There is no acute intracranial hemorrhage, mass effect or midline shift. Evaluation of infarct is difficult in the posterior fossa. Nonspecific mild periventricular and subcortical white matter lucencies likely represent chronic microvascular ischemic changes. Small lucencies scattered in the white matter represent an age-indeterminate infarct. There is mild to moderate cerebral volume loss with ventricular dilatation.    There is no depressed skull fracture. There is sinus mucosal thickening. The tympanomastoid region is unremarkable.    IMPRESSION:    No acute intracranial hemorrhage, large cortical infarct or mass effect. If clinically indicated, short-term follow-up or MRI may be obtained for further evaluation.    < end of copied text >      CRITICAL CARE TIME SPENT: 35 minutes

## 2021-08-22 NOTE — PROGRESS NOTE ADULT - ASSESSMENT
80 year old female with medical hx of HTN, HLD , Hypothyroidism, afib ? ( Patient on  amiodarone and Eliquis)  was brought to ED by daughter for progressive weakness and lethargy. Patient being admitted to ICU for sever hyponatremia.    Assessment:     - Severe Hyponatremia ( Hypovolemic Vs SIADH)  - Anemia  - UTI  - HTN  - Afib  - Hypothyroidism      Neuro:  Patient was AAO X 2 on admission  -CT head neg.  -curently AAOx3    Cardio:  #Afib  Patient with Afib on Amiodarone and Eliquis will continue with home meds    #HTN  Patient on Amlodipine and hydralazine will continue with home meds    Pulm:  -no acute issues.  -CXR clear    GI:  -p/w Vomiting:  -Zofran prn for vomiting.  -was put NPO except meds.  -started on pureed diet in AM, aspiration precautions    Renal:  #Severe Hyponatremia:  -Patients serum na noted to be 115 > 124> 132 (overcorrected) > 127  -s/p 900 ml in ED.  -urine lytes were sent after fluids.   -urine Na 35, Osmlolaity 180  -serum osm low  -likely hypovolemic in setting of vomiting and increased urination Vs SIADH.  -BMP q4.  -TSH and Cortisol wnl  - given 1 dose DDAVP  -monitor BMP.  Nephro Slaughter following    Endo:  #hx of hypothyroidism,  -cw levothyroxine  -TSH wnl  -Follow A1c.    ID:  #UTI  -Patient with positive UA and increased frequency.  -Will started rocephin  -Follow Urine Cx.    Catheters  -Zendejas Catheter placed in ED 8/20    DVT PPx:  -Eliquis for Dvt ppx    Dispo:  Will Admit the patient to ICU for severe hyponatremia 80 year old female with medical hx of HTN, HLD , Hypothyroidism, afib ? ( Patient on  amiodarone and Eliquis)  was brought to ED by daughter for progressive weakness and lethargy. Patient being admitted to ICU for sever hyponatremia.    Assessment:     - Severe Hyponatremia ( Hypovolemic Vs SIADH)  - Anemia  - UTI  - HTN  - Afib  - Hypothyroidism      Neuro:  Patient was AAO X 2 on admission  -CT head neg.  -curently AAOx3    Cardio:  #Afib  Patient with Afib on Amiodarone and Eliquis will continue with home meds    #HTN  Patient on Amlodipine and hydralazine will continue with home meds    Pulm:  -no acute issues.  -CXR clear    GI:  -p/w Vomiting:  -Zofran prn for vomiting.  -was put NPO except meds.  -started on pureed diet in AM, aspiration precautions    Renal:  #Severe Hyponatremia:  -Patients serum na noted to be 115 > 124> 132 (overcorrected) > 127  -s/p 900 ml in ED.  -urine lytes were sent after fluids.   -urine Na 35, Osmlolaity 180  -serum osm low  -likely hypovolemic in setting of vomiting and increased urination Vs SIADH.  -BMP q4.  -TSH and Cortisol wnl  -given 1 dose DDAVP  -monitor BMP.  -Nephro Slaughter following  -goal: Na - 130 (8/23 AM)    Endo:  #hx of hypothyroidism,  -cw levothyroxine  -TSH wnl  -A1c - 5.5    ID:  #UTI  -Patient with positive UA and increased frequency.  -Will started rocephin  -Follow Urine Cx.    Catheters  -Zendejas Catheter placed in ED 8/20    DVT PPx:  -Eliquis for Dvt ppx    Dispo:  Will Admit the patient to ICU for severe hyponatremia    GOC: Full code

## 2021-08-22 NOTE — PROGRESS NOTE ADULT - SUBJECTIVE AND OBJECTIVE BOX
Reason for Admission:  · Reason for Admission	Severe Hyponatremia      · Subjective and Objective:   INTERVAL HPI/OVERNIGHT EVENTS: ***    PRESSORS: [ ] YES [ ] NO  WHICH:    Antimicrobial:  cefTRIAXone   IVPB      cefTRIAXone   IVPB 1000 milliGRAM(s) IV Intermittent every 24 hours    Cardiovascular:  aMIOdarone    Tablet 100 milliGRAM(s) Oral two times a day  amLODIPine   Tablet 5 milliGRAM(s) Oral daily  hydrALAZINE 25 milliGRAM(s) Oral two times a day  propranolol 10 milliGRAM(s) Oral two times a day    Pulmonary:    Hematalogic:  apixaban 5 milliGRAM(s) Oral two times a day    Other:  latanoprost 0.005% Ophthalmic Solution 1 Drop(s) Both EYES at bedtime  levothyroxine 50 MICROGram(s) Oral daily  simvastatin 10 milliGRAM(s) Oral at bedtime  timolol 0.5% Solution 1 Drop(s) Both EYES daily    aMIOdarone    Tablet 100 milliGRAM(s) Oral two times a day  amLODIPine   Tablet 5 milliGRAM(s) Oral daily  apixaban 5 milliGRAM(s) Oral two times a day  cefTRIAXone   IVPB      cefTRIAXone   IVPB 1000 milliGRAM(s) IV Intermittent every 24 hours  hydrALAZINE 25 milliGRAM(s) Oral two times a day  latanoprost 0.005% Ophthalmic Solution 1 Drop(s) Both EYES at bedtime  levothyroxine 50 MICROGram(s) Oral daily  propranolol 10 milliGRAM(s) Oral two times a day  simvastatin 10 milliGRAM(s) Oral at bedtime  timolol 0.5% Solution 1 Drop(s) Both EYES daily    Drug Dosing Weight  Height (cm): 154.9 (20 Aug 2021 22:37)  Weight (kg): 68.8 (20 Aug 2021 22:37)  BMI (kg/m2): 28.7 (20 Aug 2021 22:37)  BSA (m2): 1.68 (20 Aug 2021 22:37)    CENTRAL LINE: [ ] YES [ ] NO  LOCATION:         ARROYO: [ ] YES [ ] NO          A-LINE:  [ ] YES [ ] NO  LOCATION:             ICU Vital Signs Last 24 Hrs  T(C): 36.9 (21 Aug 2021 23:04), Max: 37 (21 Aug 2021 03:13)  T(F): 98.4 (21 Aug 2021 23:04), Max: 98.6 (21 Aug 2021 03:13)  HR: 51 (22 Aug 2021 02:00) (49 - 61)  BP: 139/64 (22 Aug 2021 02:00) (93/47 - 148/62)  BP(mean): 82 (22 Aug 2021 02:00) (57 - 84)  ABP: --  ABP(mean): --  RR: 18 (22 Aug 2021 02:00) (12 - 20)  SpO2: 96% (22 Aug 2021 02:00) (91% - 98%)            08 @ 07:01  -  - @ 07:00  --------------------------------------------------------  IN: 595 mL / OUT: 5007 mL / NET: -4412 mL              PHYSICAL EXAM:    GENERAL: NAD, well-groomed, well-developed  EYES: EOMI, PERRLA,   NECK: Supple, No JVD; Normal thyroid; Trachea midline  NERVOUS SYSTEM:  Alert & Oriented X3,  Motor Strength 5/5 B/L upper and lower extremities; DTRs 2+ intact and symmetric  CHEST/LUNG: No rales, rhonchi, wheezing   HEART: Regular rate and rhythm; No murmurs,   ABDOMEN: Soft, Nontender, Nondistended; Bowel sounds present  EXTREMITIES:  2+ Peripheral Pulses, No clubbing, cyanosis, or edema        LABS:  CBC Full  -  ( 21 Aug 2021 04:45 )  WBC Count : 6.39 K/uL  RBC Count : 3.73 M/uL  Hemoglobin : 11.0 g/dL  Hematocrit : 31.4 %  Platelet Count - Automated : 327 K/uL  Mean Cell Volume : 84.2 fl  Mean Cell Hemoglobin : 29.5 pg  Mean Cell Hemoglobin Concentration : 35.0 gm/dL  Auto Neutrophil # : x  Auto Lymphocyte # : x  Auto Monocyte # : x  Auto Eosinophil # : x  Auto Basophil # : x  Auto Neutrophil % : x  Auto Lymphocyte % : x  Auto Monocyte % : x  Auto Eosinophil % : x  Auto Basophil % : x    08    125<L>  |  92<L>  |  10  ----------------------------<  98  4.1   |  24  |  0.57    Ca    8.3<L>      22 Aug 2021 00:15  Phos  3.0     08-  Mg     2.4     08-    TPro  7.2  /  Alb  3.5  /  TBili  0.6  /  DBili  x   /  AST  19  /  ALT  22  /  AlkPhos  80  08-20      Urinalysis Basic - ( 20 Aug 2021 20:50 )    Color: Yellow / Appearance: Slightly Turbid / S.010 / pH: x  Gluc: x / Ketone: Trace  / Bili: Negative / Urobili: Negative   Blood: x / Protein: 15 / Nitrite: Negative   Leuk Esterase: Moderate / RBC: 2-5 /HPF / WBC 6-10 /HPF   Sq Epi: x / Non Sq Epi: Moderate /HPF / Bacteria: Moderate /HPF          RADIOLOGY & ADDITIONAL STUDIES REVIEWED:  ***      CRITICAL CARE TIME SPENT: 35 minutes    Assessment and Plan:   · Assessment	  80 year old female with medical hx of HTN, HLD , Hypothyroidism, afib ? ( Patient on  amiodarone and Eliquis)  was brought to ED by daughter for progressive weakness and lethargy. Patient being admitted to ICU for sever hyponatremia.    Assessment:     - Severe Hyponatremia ( Hypovolemic Vs SIADH)  - Anemia  - UTI  - HTN  - Afib  - Hypothyroidism      Neuro:  Patient was AAO X 2 on admission  -CT head neg.  -curently AAOx3    Cardio:  #Afib  Patient with Afib on Amiodarone and Eliquis will continue with home meds    #HTN  Patient on Amlodipine and hydralazine will continue with home meds    Pulm:  -no acute issues.  -CXR clear    GI:  -p/w Vomiting:  -Zofran prn for vomiting.  -was put NPO except meds.  -started on pureed diet in AM, aspiration precautions    Renal:  #Severe Hyponatremia:  -Patients serum na noted to be 115 > 124> 132 (overcorrected) > 127  -s/p 900 ml in ED.  -urine lytes were sent after fluids.   -urine Na 35, Osmlolaity 180  -serum osm low  -likely hypovolemic in setting of vomiting and increased urination Vs SIADH.  -BMP q4.  -TSH and Cortisol wnl  - given 1 dose DDAVP  -monitor BMP.  Nephro Slaughter following    Endo:  #hx of hypothyroidism,  -cw levothyroxine  -TSH wnl  -Follow A1c.    ID:  #UTI  -Patient with positive UA and increased frequency.  -Will started rocephin  -Follow Urine Cx.    Catheters  -Arroyo Catheter placed in ED     DVT PPx:  -Eliquis for Dvt ppx    Dispo:  Will Admit the patient to ICU for severe hyponatremia

## 2021-08-22 NOTE — CHART NOTE - NSCHARTNOTEFT_GEN_A_CORE
<Hospital Course>    80 year old female with medical hx of HTN, HLD , Hypothyroidism, afib ? ( Patient on  amiodarone and Eliquis)  was brought to ED by daughter for progressive weakness and lethargy. Patient is AAO X 2 , most of hx was given by daughter at bed side. She stated that patient was feeling nauseous and then  having vomiting yesterday every hour and was not feeling good. Patient denied any pain , fever, chills at home. Patients daughter endorsed increased urinary frequency but denied any burning urination. Daughter Anna at bed side also added taht 2 days ago patient got steroid injection at back for back pain.   denied head ache, numbness , tingling , diarrhea fever, chills, or other problems.  Patient is unvaccinated against COVID 19.    <ICU Course>    Patient initially had low Serum Sodium of 115. she was given 1L NS in the ED and sodium slowly was corrected. Serum Osmolality was 270, Urine Osmolality 407 and Urine Sodium was 9. She was also given  Sodium tablets were also given. She was seen by Nephrology (Dr. Slaughter) who attributed the hyponatremia to inc. in ADH secondary to nausea and vomiting. Acute cystitis with hematuria: Urine culture not convincing of UTI however given symptoms, reasonable to continue with CTX and follow up pending urine culture.      Patient is stable to transfer to [facility] under the service of  ___________. Covering resident aware.     <Things to follow>  - daily BMP  - Goal Na: 130 (8/23 4AM) <Hospital Course>    80 year old female with medical hx of HTN, HLD , Hypothyroidism, afib (Patient on  amiodarone and Eliquis)  was brought to ED by daughter for progressive weakness and lethargy. Patient is AAO X 2 , most of hx was given by daughter at bed side. She stated that patient was feeling nauseous and then  having vomiting yesterday every hour and was not feeling good. Patient denied any pain , fever, chills at home. Patients daughter endorsed increased urinary frequency but denied any burning urination. Daughter Anna at bed side also added that 2 days ago patient got steroid injection at back for back pain.   denied head ache, numbness , tingling , diarrhea fever, chills, or other problems.  Patient is unvaccinated against COVID 19.    <ICU Course>    Patient initially had low Serum Sodium of 115. she was given 1L NS in the ED and sodium slowly was corrected. Serum Osmolality was 270, Urine Osmolality 407 and Urine Sodium was 9. She was also given  Sodium tablets were also given. She was seen by Nephrology (Dr. Slaughter) who attributed the hyponatremia to inc. in ADH secondary to nausea and vomiting. She also received 1 dose of DDAVP. He sodium continued to improve. Most recent sodium was 127 (8/22 5pm). She was also treated for acute cystitis with hematuria although the urine culture does not support UTI. Blood culture NGTD. Ceftriaxone was still given and pending Urine Culture.    Patient is stable to transfer to [facility] under the service of  ___________. Covering resident Dr. Matos is aware.     <Things to follow>  - daily BMP  - next BMP at 11pm  - Goal Na: 130 (8/23 4AM)  - Urine Culture <Hospital Course>    80 year old female with medical hx of HTN, HLD , Hypothyroidism, afib (Patient on  amiodarone and Eliquis)  was brought to ED by daughter for progressive weakness and lethargy. Patient is AAO X 2 , most of hx was given by daughter at bed side. She stated that patient was feeling nauseous and then  having vomiting yesterday every hour and was not feeling good. Patient denied any pain , fever, chills at home. Patients daughter endorsed increased urinary frequency but denied any burning urination. Daughter Anna at bed side also added that 2 days ago patient got steroid injection at back for back pain.   denied head ache, numbness , tingling , diarrhea fever, chills, or other problems.  Patient is unvaccinated against COVID 19.    <ICU Course>    Patient initially had low Serum Sodium of 115. she was given 1L NS in the ED and sodium slowly was corrected. Serum Osmolality was 270, Urine Osmolality 407 and Urine Sodium was 9. She was also given  Sodium tablets were also given. She was seen by Nephrology (Dr. Slaughter) who attributed the hyponatremia to inc. in ADH secondary to nausea and vomiting. She also received 1 dose of DDAVP. He sodium continued to improve. Most recent sodium was 127 (8/22 5pm). She was also treated for acute cystitis with hematuria although the urine culture does not support UTI. Blood culture NGTD. Ceftriaxone was still given and pending Urine Culture.    Patient is stable to transfer to [facility] under the service of Dr. Chavez. Covering resident Dr. Matos is aware.     <Things to follow>  - daily BMP  - next BMP at 11pm  - Goal Na: 130 (8/23 4AM)  - Urine Culture

## 2021-08-23 DIAGNOSIS — E03.9 HYPOTHYROIDISM, UNSPECIFIED: ICD-10-CM

## 2021-08-23 DIAGNOSIS — N39.0 URINARY TRACT INFECTION, SITE NOT SPECIFIED: ICD-10-CM

## 2021-08-23 DIAGNOSIS — E87.1 HYPO-OSMOLALITY AND HYPONATREMIA: ICD-10-CM

## 2021-08-23 DIAGNOSIS — I10 ESSENTIAL (PRIMARY) HYPERTENSION: ICD-10-CM

## 2021-08-23 DIAGNOSIS — I48.91 UNSPECIFIED ATRIAL FIBRILLATION: ICD-10-CM

## 2021-08-23 DIAGNOSIS — Z29.9 ENCOUNTER FOR PROPHYLACTIC MEASURES, UNSPECIFIED: ICD-10-CM

## 2021-08-23 DIAGNOSIS — Z02.9 ENCOUNTER FOR ADMINISTRATIVE EXAMINATIONS, UNSPECIFIED: ICD-10-CM

## 2021-08-23 LAB
ANION GAP SERPL CALC-SCNC: 5 MMOL/L — SIGNIFICANT CHANGE UP (ref 5–17)
ANION GAP SERPL CALC-SCNC: 7 MMOL/L — SIGNIFICANT CHANGE UP (ref 5–17)
BUN SERPL-MCNC: 11 MG/DL — SIGNIFICANT CHANGE UP (ref 7–18)
BUN SERPL-MCNC: 16 MG/DL — SIGNIFICANT CHANGE UP (ref 7–18)
CALCIUM SERPL-MCNC: 8.8 MG/DL — SIGNIFICANT CHANGE UP (ref 8.4–10.5)
CALCIUM SERPL-MCNC: 9.2 MG/DL — SIGNIFICANT CHANGE UP (ref 8.4–10.5)
CHLORIDE SERPL-SCNC: 100 MMOL/L — SIGNIFICANT CHANGE UP (ref 96–108)
CHLORIDE SERPL-SCNC: 101 MMOL/L — SIGNIFICANT CHANGE UP (ref 96–108)
CO2 SERPL-SCNC: 26 MMOL/L — SIGNIFICANT CHANGE UP (ref 22–31)
CO2 SERPL-SCNC: 26 MMOL/L — SIGNIFICANT CHANGE UP (ref 22–31)
CREAT SERPL-MCNC: 0.7 MG/DL — SIGNIFICANT CHANGE UP (ref 0.5–1.3)
CREAT SERPL-MCNC: 0.88 MG/DL — SIGNIFICANT CHANGE UP (ref 0.5–1.3)
GLUCOSE SERPL-MCNC: 86 MG/DL — SIGNIFICANT CHANGE UP (ref 70–99)
GLUCOSE SERPL-MCNC: 91 MG/DL — SIGNIFICANT CHANGE UP (ref 70–99)
HCT VFR BLD CALC: 34.9 % — SIGNIFICANT CHANGE UP (ref 34.5–45)
HGB BLD-MCNC: 11.9 G/DL — SIGNIFICANT CHANGE UP (ref 11.5–15.5)
MAGNESIUM SERPL-MCNC: 2.6 MG/DL — SIGNIFICANT CHANGE UP (ref 1.6–2.6)
MCHC RBC-ENTMCNC: 29.2 PG — SIGNIFICANT CHANGE UP (ref 27–34)
MCHC RBC-ENTMCNC: 34.1 GM/DL — SIGNIFICANT CHANGE UP (ref 32–36)
MCV RBC AUTO: 85.7 FL — SIGNIFICANT CHANGE UP (ref 80–100)
NRBC # BLD: 0 /100 WBCS — SIGNIFICANT CHANGE UP (ref 0–0)
PHOSPHATE SERPL-MCNC: 2.9 MG/DL — SIGNIFICANT CHANGE UP (ref 2.5–4.5)
PLATELET # BLD AUTO: 402 K/UL — HIGH (ref 150–400)
POTASSIUM SERPL-MCNC: 3.9 MMOL/L — SIGNIFICANT CHANGE UP (ref 3.5–5.3)
POTASSIUM SERPL-MCNC: 4.4 MMOL/L — SIGNIFICANT CHANGE UP (ref 3.5–5.3)
POTASSIUM SERPL-SCNC: 3.9 MMOL/L — SIGNIFICANT CHANGE UP (ref 3.5–5.3)
POTASSIUM SERPL-SCNC: 4.4 MMOL/L — SIGNIFICANT CHANGE UP (ref 3.5–5.3)
RBC # BLD: 4.07 M/UL — SIGNIFICANT CHANGE UP (ref 3.8–5.2)
RBC # FLD: 13.2 % — SIGNIFICANT CHANGE UP (ref 10.3–14.5)
SODIUM SERPL-SCNC: 131 MMOL/L — LOW (ref 135–145)
SODIUM SERPL-SCNC: 134 MMOL/L — LOW (ref 135–145)
WBC # BLD: 7.29 K/UL — SIGNIFICANT CHANGE UP (ref 3.8–10.5)
WBC # FLD AUTO: 7.29 K/UL — SIGNIFICANT CHANGE UP (ref 3.8–10.5)

## 2021-08-23 RX ORDER — SODIUM CHLORIDE 9 MG/ML
1000 INJECTION, SOLUTION INTRAVENOUS
Refills: 0 | Status: DISCONTINUED | OUTPATIENT
Start: 2021-08-23 | End: 2021-08-23

## 2021-08-23 RX ADMIN — APIXABAN 5 MILLIGRAM(S): 2.5 TABLET, FILM COATED ORAL at 17:22

## 2021-08-23 RX ADMIN — Medication 50 MICROGRAM(S): at 05:30

## 2021-08-23 RX ADMIN — AMIODARONE HYDROCHLORIDE 100 MILLIGRAM(S): 400 TABLET ORAL at 17:22

## 2021-08-23 RX ADMIN — AMLODIPINE BESYLATE 5 MILLIGRAM(S): 2.5 TABLET ORAL at 05:30

## 2021-08-23 RX ADMIN — CEFTRIAXONE 100 MILLIGRAM(S): 500 INJECTION, POWDER, FOR SOLUTION INTRAMUSCULAR; INTRAVENOUS at 22:42

## 2021-08-23 RX ADMIN — Medication 25 MILLIGRAM(S): at 11:58

## 2021-08-23 RX ADMIN — APIXABAN 5 MILLIGRAM(S): 2.5 TABLET, FILM COATED ORAL at 05:30

## 2021-08-23 RX ADMIN — Medication 1 DROP(S): at 11:58

## 2021-08-23 RX ADMIN — SIMVASTATIN 10 MILLIGRAM(S): 20 TABLET, FILM COATED ORAL at 21:04

## 2021-08-23 RX ADMIN — LATANOPROST 1 DROP(S): 0.05 SOLUTION/ DROPS OPHTHALMIC; TOPICAL at 21:04

## 2021-08-23 RX ADMIN — AMIODARONE HYDROCHLORIDE 100 MILLIGRAM(S): 400 TABLET ORAL at 05:30

## 2021-08-23 NOTE — PHYSICAL THERAPY INITIAL EVALUATION ADULT - GENERAL OBSERVATIONS, REHAB EVAL
Consult received,EMR, radiology and labs reviewed. Patient received supine in bed, NAD, [place] speaking as a PT.  Patient agreed to EVALUATION from Physical Therapist.

## 2021-08-23 NOTE — SWALLOW BEDSIDE ASSESSMENT ADULT - CONSISTENCIES ADMINISTERED
Applesauce x 3 teaspoons/puree x 4 sips/thin liquid ice chips x2 x 4 sips/nectar thick Applesauce w/ nader cracker x 3 teaspoons/mech soft

## 2021-08-23 NOTE — PROGRESS NOTE ADULT - SUBJECTIVE AND OBJECTIVE BOX
INTERVAL HPI/OVERNIGHT EVENTS:  Patient seen,events noticed,doing better  VITAL SIGNS:  T(F): 97.9 (08-23-21 @ 13:15)  HR: 61 (08-23-21 @ 13:15)  BP: 93/56 (08-23-21 @ 13:15)  RR: 18 (08-23-21 @ 13:15)  SpO2: 97% (08-23-21 @ 13:15)  Wt(kg): --    PHYSICAL EXAM:  awake  Constitutional:  Eyes:  ENMT:perrla  Neck:  Respiratory:clear  Cardiovascular:s1s2,m-none  Gastrointestinal:soft,bs pos  Extremities:  Vascular:  Neurological:no focal deficit  Musculoskeletal:    MEDICATIONS  (STANDING):  aMIOdarone    Tablet 100 milliGRAM(s) Oral two times a day  amLODIPine   Tablet 5 milliGRAM(s) Oral daily  apixaban 5 milliGRAM(s) Oral two times a day  cefTRIAXone   IVPB      cefTRIAXone   IVPB 1000 milliGRAM(s) IV Intermittent every 24 hours  dextrose 5%. 1000 milliLiter(s) (85 mL/Hr) IV Continuous <Continuous>  hydrALAZINE 25 milliGRAM(s) Oral two times a day  latanoprost 0.005% Ophthalmic Solution 1 Drop(s) Both EYES at bedtime  levothyroxine 50 MICROGram(s) Oral daily  propranolol 10 milliGRAM(s) Oral two times a day  simvastatin 10 milliGRAM(s) Oral at bedtime  timolol 0.5% Solution 1 Drop(s) Both EYES daily    MEDICATIONS  (PRN):      Allergies    No Known Allergies    Intolerances        LABS:                        11.9   7.29  )-----------( 402      ( 23 Aug 2021 05:34 )             34.9     08-23    134<L>  |  101  |  11  ----------------------------<  91  3.9   |  26  |  0.70    Ca    8.8      23 Aug 2021 05:34  Phos  2.9     08-23  Mg     2.6     08-23    TPro  6.7  /  Alb  3.1<L>  /  TBili  0.4  /  DBili  x   /  AST  14  /  ALT  20  /  AlkPhos  71  08-22          RADIOLOGY & ADDITIONAL TESTS:      · Assessment	  80 year old female with medical hx of HTN, HLD , Hypothyroidism, afib ? ( Patient on  amiodarone and Eliquis)  was brought to ED by daughter for progressive weakness and lethargy. Patient being admitted to ICU for sever hyponatremia.    Assessment:     - Severe Hyponatremia ( Hypovolemic Vs SIADH)  - Anemia  - UTI  - HTN  - Afib  - Hypothyroidism    Cardio:  #Afib  Patient with Afib on Amiodarone and Eliquis will continue with home meds    #HTN  Patient on Amlodipine and hydralazine will continue with home meds      GI:-stable lcinically  -p/w Vomiting:  -Zofran prn for vomiting.  -on regular diet    Renal:  #Severe Hyponatremia:  -improved,stable,assymptomatic  -TSH and Cortisol wnl  -monitor BMP.  Nephro Slaughter following    Endo:  #hx of hypothyroidism,  -cw levothyroxine  -TSH wnl  -Follow A1c.    ID:  #UTI  -Patient with positive UA and increased frequency.  -Will started rocephin  -Follow Urine Cx.    Catheters  -Zendejas Catheter placed in ED 8/20    DVT PPx:  -Eliquis for Dvt ppx    Dispo:  d/c planning home

## 2021-08-23 NOTE — PROGRESS NOTE ADULT - ASSESSMENT
80y Female with history of HTN, afib presents with weakness. Nephrology consulted for hyponatremia.    1) Hyponatremia: Hypotonic hyponatremia likely due to increased ADH state from nausea and vomiting which has now resolved with overcorrection s/p D5W and DDAVP. Na overcorrecting s/p D5 IVF x4 hours. Repeat BMP  Please monitor for overcorrection. Goal serum Na 135 on 8/24 AM. Monitor serum Na and do not correct more than 8 meQ/day.    2) HTN: BP borderline with bradycardia. Can consider discontinuation of beta-blocker. Monitor BP.    3) Acute cystitis with hematuria: UA not convincing of UTI however given symptoms, reasonable to continue with CTX. UCX contaminated; recc to repeat urine culture.      [facility] NEPHROLOGY  Manule Clay M.D.  Mark Slaughter D.O.  Marj Benoit M.D.  Shamika Lopez, JANELL, ANP-C    Telephone: [phone]  Facsimile: [phone]    [address] [place] [id number]

## 2021-08-23 NOTE — SWALLOW BEDSIDE ASSESSMENT ADULT - NS ASR SWALLOW FINDINGS DISCUS
Telephone Encounter by Pepper Barn RN at 02/20/18 04:26 PM     Author:  Pepper Bran RN Service:  (none) Author Type:  Registered Nurse     Filed:  02/20/18 04:26 PM Encounter Date:  2/19/2018 Status:  Signed     :  Pepper Bran RN (Registered Nurse)            left message to call office on voice mail[[id number]]        Revision History        User Key Date/Time User Provider Type Action    > KA1.1 02/20/18 04:26 PM Pepper Bran RN Registered Nurse Sign    M - Manual             NP Frances/Nursing

## 2021-08-23 NOTE — PROGRESS NOTE ADULT - SUBJECTIVE AND OBJECTIVE BOX
HPI:  80 YOF admitted with hyponatremia.  IV fluids started.    OVERNIGHT EVENTS:  No new overnight events.  Seen and examined at bedside.     REVIEW OF SYSTEMS:      CONSTITUTIONAL: No fever  EYES: no acute visual disturbances  NECK: No pain or stiffness  RESPIRATORY: No cough; No shortness of breath  CARDIOVASCULAR: No chest pain, no palpitations  GASTROINTESTINAL: No pain. No nausea, vomiting or diarrhea; pt complains of inability to chew food secondary to hot having dentures  NEUROLOGICAL: No headache or numbness, no tremors  MUSCULOSKELETAL: No joint pain, no muscle pain  GENITOURINARY: no dysuria, no frequency, no hesitancy  PSYCHIATRY: no depression, no anxiety  ALL OTHER  ROS negative        Vital Signs Last 24 Hrs  T(C): 36.6 (23 Aug 2021 13:15), Max: 37.4 (23 Aug 2021 05:04)  T(F): 97.9 (23 Aug 2021 13:15), Max: 99.4 (23 Aug 2021 05:04)  HR: 61 (23 Aug 2021 13:15) (58 - 63)  BP: 93/56 (23 Aug 2021 13:15) (93/56 - 156/82)  BP(mean): 68 (23 Aug 2021 13:15) (65 - 68)  RR: 18 (23 Aug 2021 13:15) (17 - 23)  SpO2: 97% (23 Aug 2021 13:15) (97% - 99%)    ________________________________________________  PHYSICAL EXAM:    GENERAL: NAD  HEENT: Normocephalic; conjunctivae and sclerae clear;  NECK : supple, no JVD  CHEST/LUNG: Clear to auscultation; Nonlabored  HEART: S1 S2  regular  ABDOMEN: Soft, Nontender, Nondistended; Bowel sounds present  EXTREMITIES: no cyanosis; no LE edema; no calf tenderness  SKIN: warm and dry  NERVOUS SYSTEM:  Alert; no new deficits    _________________________________________________  CURRENT MEDICATIONS:    MEDICATIONS  (STANDING):  aMIOdarone    Tablet 100 milliGRAM(s) Oral two times a day  amLODIPine   Tablet 5 milliGRAM(s) Oral daily  apixaban 5 milliGRAM(s) Oral two times a day  cefTRIAXone   IVPB      cefTRIAXone   IVPB 1000 milliGRAM(s) IV Intermittent every 24 hours  hydrALAZINE 25 milliGRAM(s) Oral two times a day  latanoprost 0.005% Ophthalmic Solution 1 Drop(s) Both EYES at bedtime  levothyroxine 50 MICROGram(s) Oral daily  propranolol 10 milliGRAM(s) Oral two times a day  simvastatin 10 milliGRAM(s) Oral at bedtime  timolol 0.5% Solution 1 Drop(s) Both EYES daily    MEDICATIONS  (PRN):      __________________________________________________  LABS:                          11.9   7.29  )-----------( 402      ( 23 Aug 2021 05:34 )             34.9     08-23    131<L>  |  100  |  16  ----------------------------<  86  4.4   |  26  |  0.88    Ca    9.2      23 Aug 2021 15:49  Phos  2.9     08-23  Mg     2.6     08-23    TPro  6.7  /  Alb  3.1<L>  /  TBili  0.4  /  DBili  x   /  AST  14  /  ALT  20  /  AlkPhos  71  08-22        CAPILLARY BLOOD GLUCOSE          __________________________________________________  RADIOLOGY & ADDITIONAL TESTS:    Imaging Personally Reviewed:  YES    < from: CT Head No Cont (08.20.21 @ 22:29) >  FINDINGS: Artifact degrades images of the posterior fossa. There is no acute intracranial hemorrhage, mass effect or midline shift. Evaluation of infarct is difficult in the posterior fossa. Nonspecific mild periventricular and subcortical white matter lucencies likely represent chronic microvascular ischemic changes. Small lucencies scattered in the white matter represent an age-indeterminate infarct. There is mild to moderate cerebral volume loss with ventricular dilatation.    There is no depressed skull fracture. There is sinus mucosal thickening. The tympanomastoid region is unremarkable.    IMPRESSION:    No acute intracranial hemorrhage, large cortical infarct or mass effect. If clinically indicated, short-term follow-up or MRI may be obtained for further evaluation.    < end of copied text >    Consultant(s) Notes Reviewed:   YES     Plan of care was discussed with patient and /or primary care giver; all questions and concerns were addressed and care was aligned with patient's wishes.    Plan discussed with attending and consulting physicians.

## 2021-08-23 NOTE — SWALLOW BEDSIDE ASSESSMENT ADULT - SLP PERTINENT HISTORY OF CURRENT PROBLEM
80F. PMHx of HTN, HLD, Hypothyroidism, afib(Pt on  Amiodarone and Eliquis). Pt was reportedly brought to ED by daughter for progressive weakness and lethargy. Most of hx was reportedly given by daughter at bedside. She reportedly stated that pt was feeling nauseous, then having vomiting the day prior to admit every hour and was not feeling well. Pt denied any pain, fever, chills, head ache, numbness , tingling , diarrhea fever, or other problems. Pt's daughter endorsed increased urinary frequency but denied any burning urination. Of note, Daughter Anna at bedside also added that 2 days ago patient got steroid injection at back for back pain. Patient is unvaccinated against COVID 19.

## 2021-08-23 NOTE — SWALLOW BEDSIDE ASSESSMENT ADULT - SWALLOW EVAL: DIAGNOSIS
Pt p/w adequate oral & pharyngeal phases of swallow e/b good labial seal, functional bolus manipulation/propulsion, & timely pharyngeal swallow. Due to dentures not being present at time of exam, solid trials were not administered. No overt s/s of laryngeal penetration or aspiration at this exam.

## 2021-08-23 NOTE — PROGRESS NOTE ADULT - SUBJECTIVE AND OBJECTIVE BOX
[facility] NEPHROLOGY- PROGRESS NOTE    80y Female with history of HTN, afib presents with weakness. Nephrology consulted for hyponatremia.    REVIEW OF SYSTEMS:  Gen: no changes in weight, + weakness  Cards: no chest pain  Resp: no dyspnea  GI: no nausea or vomiting or diarrhea  Vascular: no LE edema    No Known Allergies      Hospital Medications: Medications reviewed    VITALS:  T(F): 97.9 (21 @ 13:15), Max: 99.4 (21 @ 05:04)  HR: 61 (21 @ 13:15)  BP: 93/56 (21 @ 13:15)  RR: 18 (21 @ 13:15)  SpO2: 97% (21 @ 13:15)  Wt(kg): --     @ 07:01  -   @ 07:00  --------------------------------------------------------  IN: 450 mL / OUT: 1500 mL / NET: -1050 mL      PHYSICAL EXAM:    Gen: NAD, calm  Cards: RRR, +S1/S2, no M/G/R  Resp: CTA B/L  GI: soft, NT/ND, NABS  Vascular: no LE edema B/L    LABS:      134<L>  |  101  |  11  ----------------------------<  91  3.9   |  26  |  0.70    Ca    8.8      23 Aug 2021 05:34  Phos  2.9       Mg     2.6         TPro  6.7  /  Alb  3.1<L>  /  TBili  0.4  /  DBili      /  AST  14  /  ALT  20  /  AlkPhos  71      Creatinine Trend: 0.70 <--, 0.78 <--, 0.87 <--, 0.82 <--, 0.52 <--, 0.57 <--, 0.58 <--, 0.66 <--, 0.68 <--, 0.68 <--, 0.64 <--, 0.58 <--, 0.53 <--                        11.9   7.29  )-----------( 402      ( 23 Aug 2021 05:34 )             34.9     Urine Studies:  Urinalysis Basic - ( 20 Aug 2021 20:50 )    Color: Yellow / Appearance: Slightly Turbid / S.010 / pH:   Gluc:  / Ketone: Trace  / Bili: Negative / Urobili: Negative   Blood:  / Protein: 15 / Nitrite: Negative   Leuk Esterase: Moderate / RBC: 2-5 /HPF / WBC 6-10 /HPF   Sq Epi:  / Non Sq Epi: Moderate /HPF / Bacteria: Moderate /HPF      Osmolality, Random Urine: 440 mos/kg ( @ 20:12)  Sodium, Random Urine: 38 mmol/L ( @ 20:12)  Calcium, Random Urine: 5.4 mg/dL ( @ 03:08)  Osmolality, Random Urine: 81 mos/kg ( @ 23:57)  Sodium, Random Urine: 21 mmol/L ( @ 23:57)  Creatinine, Random Urine: <13 mg/dL ( @ 23:57)  Osmolality, Random Urine: 184 mos/kg ( @ 20:50)  Sodium, Random Urine: 35 mmol/L ( @ 20:50)  Chloride, Random Urine: 35 mmol/L ( @ 20:50)  Creatinine, Random Urine: <13 mg/dL ( @ 20:50)  Potassium, Random Urine: 18 mmol/L ( @ 20:50)

## 2021-08-23 NOTE — PROGRESS NOTE ADULT - PROBLEM SELECTOR PLAN 3
Controlled  Pt takes amlodipine, hydralazine and propanolol at home  Continue home regimen with parameters  Monitor BP   Continue DASH diet

## 2021-08-23 NOTE — SWALLOW BEDSIDE ASSESSMENT ADULT - COMMENTS
Pt received HOB 45°, elevated to 90° by SLP.  AA+O x 3. Granddaughter at bedside, used for translation purposes.

## 2021-08-24 VITALS
TEMPERATURE: 98 F | SYSTOLIC BLOOD PRESSURE: 120 MMHG | OXYGEN SATURATION: 95 % | WEIGHT: 152.56 LBS | RESPIRATION RATE: 18 BRPM | HEART RATE: 68 BPM | DIASTOLIC BLOOD PRESSURE: 58 MMHG

## 2021-08-24 LAB
ANION GAP SERPL CALC-SCNC: 6 MMOL/L — SIGNIFICANT CHANGE UP (ref 5–17)
BUN SERPL-MCNC: 14 MG/DL — SIGNIFICANT CHANGE UP (ref 7–18)
CALCIUM SERPL-MCNC: 9.2 MG/DL — SIGNIFICANT CHANGE UP (ref 8.4–10.5)
CHLORIDE SERPL-SCNC: 101 MMOL/L — SIGNIFICANT CHANGE UP (ref 96–108)
CO2 SERPL-SCNC: 28 MMOL/L — SIGNIFICANT CHANGE UP (ref 22–31)
CREAT SERPL-MCNC: 0.81 MG/DL — SIGNIFICANT CHANGE UP (ref 0.5–1.3)
GLUCOSE SERPL-MCNC: 88 MG/DL — SIGNIFICANT CHANGE UP (ref 70–99)
HCT VFR BLD CALC: 33.1 % — LOW (ref 34.5–45)
HGB BLD-MCNC: 11.2 G/DL — LOW (ref 11.5–15.5)
MAGNESIUM SERPL-MCNC: 2.4 MG/DL — SIGNIFICANT CHANGE UP (ref 1.6–2.6)
MCHC RBC-ENTMCNC: 29.3 PG — SIGNIFICANT CHANGE UP (ref 27–34)
MCHC RBC-ENTMCNC: 33.8 GM/DL — SIGNIFICANT CHANGE UP (ref 32–36)
MCV RBC AUTO: 86.6 FL — SIGNIFICANT CHANGE UP (ref 80–100)
NRBC # BLD: 0 /100 WBCS — SIGNIFICANT CHANGE UP (ref 0–0)
PHOSPHATE SERPL-MCNC: 3.6 MG/DL — SIGNIFICANT CHANGE UP (ref 2.5–4.5)
PLATELET # BLD AUTO: 384 K/UL — SIGNIFICANT CHANGE UP (ref 150–400)
POTASSIUM SERPL-MCNC: 3.8 MMOL/L — SIGNIFICANT CHANGE UP (ref 3.5–5.3)
POTASSIUM SERPL-SCNC: 3.8 MMOL/L — SIGNIFICANT CHANGE UP (ref 3.5–5.3)
RBC # BLD: 3.82 M/UL — SIGNIFICANT CHANGE UP (ref 3.8–5.2)
RBC # FLD: 13.3 % — SIGNIFICANT CHANGE UP (ref 10.3–14.5)
SODIUM SERPL-SCNC: 135 MMOL/L — SIGNIFICANT CHANGE UP (ref 135–145)
WBC # BLD: 7.95 K/UL — SIGNIFICANT CHANGE UP (ref 3.8–10.5)
WBC # FLD AUTO: 7.95 K/UL — SIGNIFICANT CHANGE UP (ref 3.8–10.5)

## 2021-08-24 PROCEDURE — 86769 SARS-COV-2 COVID-19 ANTIBODY: CPT

## 2021-08-24 PROCEDURE — 71045 X-RAY EXAM CHEST 1 VIEW: CPT

## 2021-08-24 PROCEDURE — 87040 BLOOD CULTURE FOR BACTERIA: CPT

## 2021-08-24 PROCEDURE — 93005 ELECTROCARDIOGRAM TRACING: CPT

## 2021-08-24 PROCEDURE — 83935 ASSAY OF URINE OSMOLALITY: CPT

## 2021-08-24 PROCEDURE — 84300 ASSAY OF URINE SODIUM: CPT

## 2021-08-24 PROCEDURE — 80048 BASIC METABOLIC PNL TOTAL CA: CPT

## 2021-08-24 PROCEDURE — 87086 URINE CULTURE/COLONY COUNT: CPT

## 2021-08-24 PROCEDURE — 84484 ASSAY OF TROPONIN QUANT: CPT

## 2021-08-24 PROCEDURE — 0225U NFCT DS DNA&RNA 21 SARSCOV2: CPT

## 2021-08-24 PROCEDURE — 83036 HEMOGLOBIN GLYCOSYLATED A1C: CPT

## 2021-08-24 PROCEDURE — 82436 ASSAY OF URINE CHLORIDE: CPT

## 2021-08-24 PROCEDURE — 82570 ASSAY OF URINE CREATININE: CPT

## 2021-08-24 PROCEDURE — 82340 ASSAY OF CALCIUM IN URINE: CPT

## 2021-08-24 PROCEDURE — 84443 ASSAY THYROID STIM HORMONE: CPT

## 2021-08-24 PROCEDURE — 83550 IRON BINDING TEST: CPT

## 2021-08-24 PROCEDURE — 82728 ASSAY OF FERRITIN: CPT

## 2021-08-24 PROCEDURE — 84550 ASSAY OF BLOOD/URIC ACID: CPT

## 2021-08-24 PROCEDURE — 99285 EMERGENCY DEPT VISIT HI MDM: CPT

## 2021-08-24 PROCEDURE — 70450 CT HEAD/BRAIN W/O DYE: CPT | Mod: MA

## 2021-08-24 PROCEDURE — 83540 ASSAY OF IRON: CPT

## 2021-08-24 PROCEDURE — 80053 COMPREHEN METABOLIC PANEL: CPT

## 2021-08-24 PROCEDURE — 85027 COMPLETE CBC AUTOMATED: CPT

## 2021-08-24 PROCEDURE — 96374 THER/PROPH/DIAG INJ IV PUSH: CPT

## 2021-08-24 PROCEDURE — 83735 ASSAY OF MAGNESIUM: CPT

## 2021-08-24 PROCEDURE — 85025 COMPLETE CBC W/AUTO DIFF WBC: CPT

## 2021-08-24 PROCEDURE — 97161 PT EVAL LOW COMPLEX 20 MIN: CPT

## 2021-08-24 PROCEDURE — 83930 ASSAY OF BLOOD OSMOLALITY: CPT

## 2021-08-24 PROCEDURE — 81001 URINALYSIS AUTO W/SCOPE: CPT

## 2021-08-24 PROCEDURE — 84133 ASSAY OF URINE POTASSIUM: CPT

## 2021-08-24 PROCEDURE — 85045 AUTOMATED RETICULOCYTE COUNT: CPT

## 2021-08-24 PROCEDURE — 36415 COLL VENOUS BLD VENIPUNCTURE: CPT

## 2021-08-24 PROCEDURE — 82533 TOTAL CORTISOL: CPT

## 2021-08-24 PROCEDURE — 84100 ASSAY OF PHOSPHORUS: CPT

## 2021-08-24 PROCEDURE — 83605 ASSAY OF LACTIC ACID: CPT

## 2021-08-24 RX ADMIN — AMIODARONE HYDROCHLORIDE 100 MILLIGRAM(S): 400 TABLET ORAL at 05:36

## 2021-08-24 RX ADMIN — Medication 50 MICROGRAM(S): at 05:36

## 2021-08-24 RX ADMIN — APIXABAN 5 MILLIGRAM(S): 2.5 TABLET, FILM COATED ORAL at 05:36

## 2021-08-24 RX ADMIN — AMLODIPINE BESYLATE 5 MILLIGRAM(S): 2.5 TABLET ORAL at 05:36

## 2021-08-24 RX ADMIN — Medication 25 MILLIGRAM(S): at 05:36

## 2021-08-24 RX ADMIN — Medication 1 DROP(S): at 11:33

## 2021-08-24 NOTE — DISCHARGE NOTE NURSING/CASE MANAGEMENT/SOCIAL WORK - PATIENT PORTAL LINK FT
You can access the FollowMyHealth Patient Portal offered by [facility] by registering at the following website: [email or web address]. By joining [employer]’s FollowMyHealth portal, you will also be able to view your health information using other applications (apps) compatible with our system.

## 2021-08-24 NOTE — DISCHARGE NOTE PROVIDER - HOSPITAL COURSE
80 year old female with medical hx of HTN, HLD , Hypothyroidism, afib (Patient on  amiodarone and Eliquis)  was brought to ED by daughter for progressive weakness and lethargy. Patient is AAO X 2 , most of hx was given by daughter at bed side. She stated that patient was feeling nauseous and then  having vomiting yesterday every hour and was not feeling good. Patient denied any pain , fever, chills at home. Patients daughter endorsed increased urinary frequency but denied any burning urination. Daughter Anna at bed side also added that 2 days ago patient got steroid injection at back for back pain.   denied head ache, numbness , tingling , diarrhea fever, chills, or other problems.  Patient is unvaccinated against COVID 19.    <ICU Course>    Patient initially had low Serum Sodium of 115. she was given 1L NS in the ED and sodium slowly was corrected. Serum Osmolality was 270, Urine Osmolality 407 and Urine Sodium was 9. She was also given  Sodium tablets were also given. She was seen by Nephrology (Dr. Slaughter) who attributed the hyponatremia to inc. in ADH secondary to nausea and vomiting. She also received 1 dose of DDAVP. Her sodium continued to improve. Most recent sodium was 127 (8/22 5pm). She was also treated for acute cystitis with hematuria although the urine culture does not support UTI. Blood culture NGTD. Ceftriaxone was still given and pending Urine Culture.    Patient transferred to medicine floor.  Completed course of ABX.  UCx contaminated, pt remains asymptomatic.  Sodium 135.    Discussed with attending.    Patient medically stable for discharge.    For full hospital course, please see medical record.

## 2021-08-24 NOTE — DISCHARGE NOTE PROVIDER - NSDCCPCAREPLAN_GEN_ALL_CORE_FT
PRINCIPAL DISCHARGE DIAGNOSIS  Diagnosis: Hyponatremia  Assessment and Plan of Treatment: HOME CARE INSTRUCTIONS  Only take medicines as directed by your caregiver. Many medicines can make hyponatremia worse. Discuss all your medicines with your caregiver.  Carefully follow any recommended diet, including any fluid restrictions.  You may be asked to repeat lab tests. Follow these directions.  Avoid alcohol and recreational drugs.  SEEK MEDICAL CARE IF:  You develop worsening nausea, fatigue, headache, confusion, or weakness.  Your original hyponatremia symptoms return.  You have problems following the recommended diet.   SEEK IMMEDIATE MEDICAL CARE IF:  You have a seizure.  You faint.  You have ongoing diarrhea or vomiting      SECONDARY DISCHARGE DIAGNOSES  Diagnosis: Nausea and vomiting  Assessment and Plan of Treatment: Your nausea and vomiting were attributed to your hyponatremia and has since resolved.    Diagnosis: Urinary tract infection  Assessment and Plan of Treatment: HOME CARE INSTRUCTIONS  Drink enough water and fluids to keep your urine clear or pale yellow.  Avoid caffeine, tea, and carbonated beverages. They tend to irritate your bladder.  Empty your bladder often. Avoid holding urine for long periods of time.  Empty your bladder before and after sexual intercourse.  After a bowel movement, women should cleanse from front to back. Use each tissue only once.  SEEK MEDICAL CARE IF:  You have back pain.  You develop a fever.  Your symptoms do not begin to resolve within 3 days.  SEEK IMMEDIATE MEDICAL CARE IF:  You have severe back pain or lower abdominal pain.  You develop chills.  You have nausea or vomiting.  You have continued burning or discomfort with urination.    Diagnosis: HTN (hypertension)  Assessment and Plan of Treatment: Continue to take your medication as prescribed.  Follow up with your primary doctor regularly to ensure your medication is therapeutic.    Diagnosis: Atrial fibrillation  Assessment and Plan of Treatment: Continue to take your medication as prescribed.  Follow up with your primary doctor regularly to ensure your medication is therapeutic.

## 2021-08-24 NOTE — DISCHARGE NOTE NURSING/CASE MANAGEMENT/SOCIAL WORK - NSDCPEFALRISK_GEN_ALL_CORE
For information on Fall & injury Prevention, visit [email or web address]

## 2021-08-24 NOTE — PROGRESS NOTE ADULT - ASSESSMENT
80y Female with history of HTN, afib presents with weakness. Nephrology consulted for hyponatremia.    1) Hyponatremia: Hypotonic hyponatremia likely due to increased ADH state from nausea and vomiting which has now resolved with overcorrection s/p D5W and DDAVP. Serum Na at goal today. Monitor serum Na and do not correct more than 8 meQ/day.    2) HTN: BP improved. If hypotensive can consider discontinuation of beta-blocker. Monitor BP.    3) Acute cystitis with hematuria: UA not convincing of UTI however given symptoms, s/p CTX. UCX contaminated; recc to repeat urine culture.      [facility] NEPHROLOGY  Manuel Clay M.D.  Mark Slaughter D.O.  Marj Benoit M.D.  Shamika Lopez, MSN, ANP-C    Telephone: [phone]  Facsimile: [phone]    [address] [place] [address]

## 2021-08-24 NOTE — DISCHARGE NOTE PROVIDER - CARE PROVIDER_API CALL
Ernesto Chavez  INTERNAL MEDICINE  [id number], [address]  [place] [address]  Phone: [phone]  Fax: [phone]  Follow Up Time: 2 weeks

## 2021-08-24 NOTE — PROGRESS NOTE ADULT - SUBJECTIVE AND OBJECTIVE BOX
[facility] NEPHROLOGY- PROGRESS NOTE    80y Female with history of HTN, afib presents with weakness. Nephrology consulted for hyponatremia.    REVIEW OF SYSTEMS:  Gen: no changes in weight,  Cards: no chest pain  Resp: no dyspnea  GI: no nausea or vomiting or diarrhea  Vascular: no LE edema    No Known Allergies      Hospital Medications: Medications reviewed    VITALS:  T(F): 98.2 (21 @ 05:00), Max: 98.8 (21 @ 20:22)  HR: 68 (21 @ 05:00)  BP: 120/58 (21 @ 05:00)  RR: 18 (21 @ 05:00)  SpO2: 95% (21 @ 05:00)  Wt(kg): --      PHYSICAL EXAM:    Gen: NAD, calm  Cards: RRR, +S1/S2, no M/G/R  Resp: CTA B/L  GI: soft, NT/ND, NABS  Vascular: no LE edema B/L    LABS:      135  |  101  |  14  ----------------------------<  88  3.8   |  28  |  0.81    Ca    9.2      24 Aug 2021 06:09  Phos  3.6       Mg     2.4           Creatinine Trend: 0.81 <--, 0.88 <--, 0.70 <--, 0.78 <--, 0.87 <--, 0.82 <--, 0.52 <--, 0.57 <--, 0.58 <--, 0.66 <--, 0.68 <--, 0.68 <--, 0.64 <--, 0.58 <--, 0.53 <--                        11.2   7.95  )-----------( 384      ( 24 Aug 2021 06:09 )             33.1     Urine Studies:  Urinalysis Basic - ( 20 Aug 2021 20:50 )    Color: Yellow / Appearance: Slightly Turbid / S.010 / pH:   Gluc:  / Ketone: Trace  / Bili: Negative / Urobili: Negative   Blood:  / Protein: 15 / Nitrite: Negative   Leuk Esterase: Moderate / RBC: 2-5 /HPF / WBC 6-10 /HPF   Sq Epi:  / Non Sq Epi: Moderate /HPF / Bacteria: Moderate /HPF      Osmolality, Random Urine: 440 mos/kg ( @ 20:12)  Sodium, Random Urine: 38 mmol/L ( @ 20:12)  Calcium, Random Urine: 5.4 mg/dL ( @ 03:08)  Osmolality, Random Urine: 81 mos/kg ( @ 23:57)  Sodium, Random Urine: 21 mmol/L ( @ 23:57)  Creatinine, Random Urine: <13 mg/dL ( @ 23:57)  Osmolality, Random Urine: 184 mos/kg ( @ 20:50)  Sodium, Random Urine: 35 mmol/L ( @ 20:50)  Chloride, Random Urine: 35 mmol/L ( @ 20:50)  Creatinine, Random Urine: <13 mg/dL ( @ 20:50)  Potassium, Random Urine: 18 mmol/L ( @ 20:50)

## 2021-08-24 NOTE — PROGRESS NOTE ADULT - PROVIDER SPECIALTY LIST ADULT
Critical Care
Internal Medicine
Internal Medicine
Nephrology
Nephrology
Critical Care
Nephrology
Internal Medicine

## 2021-08-24 NOTE — DISCHARGE NOTE PROVIDER - NSDCMRMEDTOKEN_GEN_ALL_CORE_FT
Mother
amiodarone 200 mg oral tablet: 0.5 tab(s) orally 2 times a day  amLODIPine 5 mg oral tablet: 1 tab(s) orally once a day  Eliquis 5 mg oral tablet: 1 tab(s) orally 2 times a day  hydrALAZINE 25 mg oral tablet: 1 tab(s) orally 2 times a day  latanoprost 0.005% ophthalmic solution: 1 drop(s) to each affected eye once a day (in the evening)  levothyroxine 50 mcg (0.05 mg) oral tablet: 1 tab(s) orally once a day  propranolol 10 mg oral tablet: 1 tab(s) orally 2 times a day  simvastatin 10 mg oral tablet: 1 tab(s) orally once a day (at bedtime)  timolol hemihydrate 0.5% ophthalmic solution: 1 drop(s) to each affected eye 2 times a day

## 2021-08-26 LAB
CULTURE RESULTS: SIGNIFICANT CHANGE UP
CULTURE RESULTS: SIGNIFICANT CHANGE UP
SPECIMEN SOURCE: SIGNIFICANT CHANGE UP
SPECIMEN SOURCE: SIGNIFICANT CHANGE UP

## 2021-09-16 ENCOUNTER — INPATIENT (INPATIENT)
Facility: HOSPITAL | Age: 81
LOS: 3 days | Discharge: HOME | DRG: 641 | End: 2021-09-20
Attending: INTERNAL MEDICINE | Admitting: INTERNAL MEDICINE
Payer: MEDICARE

## 2021-09-16 VITALS
DIASTOLIC BLOOD PRESSURE: 83 MMHG | RESPIRATION RATE: 18 BRPM | OXYGEN SATURATION: 97 % | TEMPERATURE: 98 F | HEIGHT: 61 IN | SYSTOLIC BLOOD PRESSURE: 147 MMHG | WEIGHT: 158.07 LBS | HEART RATE: 56 BPM

## 2021-09-16 DIAGNOSIS — E87.1 HYPO-OSMOLALITY AND HYPONATREMIA: ICD-10-CM

## 2021-09-16 PROBLEM — E03.9 HYPOTHYROIDISM, UNSPECIFIED: Chronic | Status: ACTIVE | Noted: 2021-08-20

## 2021-09-16 PROBLEM — I48.91 UNSPECIFIED ATRIAL FIBRILLATION: Chronic | Status: ACTIVE | Noted: 2021-08-20

## 2021-09-16 LAB
ALBUMIN SERPL ELPH-MCNC: 3.8 G/DL — SIGNIFICANT CHANGE UP (ref 3.5–5)
ALP SERPL-CCNC: 80 U/L — SIGNIFICANT CHANGE UP (ref 40–120)
ALT FLD-CCNC: 25 U/L DA — SIGNIFICANT CHANGE UP (ref 10–60)
ANION GAP SERPL CALC-SCNC: 10 MMOL/L — SIGNIFICANT CHANGE UP (ref 5–17)
ANION GAP SERPL CALC-SCNC: 11 MMOL/L — SIGNIFICANT CHANGE UP (ref 5–17)
ANION GAP SERPL CALC-SCNC: 8 MMOL/L — SIGNIFICANT CHANGE UP (ref 5–17)
ANION GAP SERPL CALC-SCNC: 8 MMOL/L — SIGNIFICANT CHANGE UP (ref 5–17)
ANION GAP SERPL CALC-SCNC: 9 MMOL/L — SIGNIFICANT CHANGE UP (ref 5–17)
ANION GAP SERPL CALC-SCNC: 9 MMOL/L — SIGNIFICANT CHANGE UP (ref 5–17)
APPEARANCE UR: CLEAR — SIGNIFICANT CHANGE UP
AST SERPL-CCNC: 16 U/L — SIGNIFICANT CHANGE UP (ref 10–40)
BASOPHILS # BLD AUTO: 0.02 K/UL — SIGNIFICANT CHANGE UP (ref 0–0.2)
BASOPHILS NFR BLD AUTO: 0.3 % — SIGNIFICANT CHANGE UP (ref 0–2)
BILIRUB SERPL-MCNC: 0.6 MG/DL — SIGNIFICANT CHANGE UP (ref 0.2–1.2)
BILIRUB UR-MCNC: NEGATIVE — SIGNIFICANT CHANGE UP
BUN SERPL-MCNC: 10 MG/DL — SIGNIFICANT CHANGE UP (ref 7–18)
BUN SERPL-MCNC: 7 MG/DL — SIGNIFICANT CHANGE UP (ref 7–18)
BUN SERPL-MCNC: 8 MG/DL — SIGNIFICANT CHANGE UP (ref 7–18)
CALCIUM SERPL-MCNC: 8.4 MG/DL — SIGNIFICANT CHANGE UP (ref 8.4–10.5)
CALCIUM SERPL-MCNC: 8.4 MG/DL — SIGNIFICANT CHANGE UP (ref 8.4–10.5)
CALCIUM SERPL-MCNC: 8.5 MG/DL — SIGNIFICANT CHANGE UP (ref 8.4–10.5)
CALCIUM SERPL-MCNC: 8.6 MG/DL — SIGNIFICANT CHANGE UP (ref 8.4–10.5)
CALCIUM SERPL-MCNC: 8.7 MG/DL — SIGNIFICANT CHANGE UP (ref 8.4–10.5)
CALCIUM SERPL-MCNC: 9 MG/DL — SIGNIFICANT CHANGE UP (ref 8.4–10.5)
CHLORIDE SERPL-SCNC: 84 MMOL/L — LOW (ref 96–108)
CHLORIDE SERPL-SCNC: 86 MMOL/L — LOW (ref 96–108)
CHLORIDE SERPL-SCNC: 88 MMOL/L — LOW (ref 96–108)
CHLORIDE SERPL-SCNC: 88 MMOL/L — LOW (ref 96–108)
CHLORIDE SERPL-SCNC: 90 MMOL/L — LOW (ref 96–108)
CHLORIDE SERPL-SCNC: 90 MMOL/L — LOW (ref 96–108)
CO2 SERPL-SCNC: 22 MMOL/L — SIGNIFICANT CHANGE UP (ref 22–31)
CO2 SERPL-SCNC: 23 MMOL/L — SIGNIFICANT CHANGE UP (ref 22–31)
CO2 SERPL-SCNC: 24 MMOL/L — SIGNIFICANT CHANGE UP (ref 22–31)
CO2 SERPL-SCNC: 24 MMOL/L — SIGNIFICANT CHANGE UP (ref 22–31)
CO2 SERPL-SCNC: 25 MMOL/L — SIGNIFICANT CHANGE UP (ref 22–31)
CO2 SERPL-SCNC: 26 MMOL/L — SIGNIFICANT CHANGE UP (ref 22–31)
COLOR SPEC: YELLOW — SIGNIFICANT CHANGE UP
CREAT ?TM UR-MCNC: <13 MG/DL — SIGNIFICANT CHANGE UP
CREAT SERPL-MCNC: 0.5 MG/DL — SIGNIFICANT CHANGE UP (ref 0.5–1.3)
CREAT SERPL-MCNC: 0.52 MG/DL — SIGNIFICANT CHANGE UP (ref 0.5–1.3)
CREAT SERPL-MCNC: 0.58 MG/DL — SIGNIFICANT CHANGE UP (ref 0.5–1.3)
CREAT SERPL-MCNC: 0.65 MG/DL — SIGNIFICANT CHANGE UP (ref 0.5–1.3)
CREAT SERPL-MCNC: 0.66 MG/DL — SIGNIFICANT CHANGE UP (ref 0.5–1.3)
CREAT SERPL-MCNC: 0.79 MG/DL — SIGNIFICANT CHANGE UP (ref 0.5–1.3)
DIFF PNL FLD: NEGATIVE — SIGNIFICANT CHANGE UP
EOSINOPHIL # BLD AUTO: 0.06 K/UL — SIGNIFICANT CHANGE UP (ref 0–0.5)
EOSINOPHIL NFR BLD AUTO: 0.9 % — SIGNIFICANT CHANGE UP (ref 0–6)
GLUCOSE SERPL-MCNC: 112 MG/DL — HIGH (ref 70–99)
GLUCOSE SERPL-MCNC: 114 MG/DL — HIGH (ref 70–99)
GLUCOSE SERPL-MCNC: 114 MG/DL — HIGH (ref 70–99)
GLUCOSE SERPL-MCNC: 135 MG/DL — HIGH (ref 70–99)
GLUCOSE SERPL-MCNC: 137 MG/DL — HIGH (ref 70–99)
GLUCOSE SERPL-MCNC: 98 MG/DL — SIGNIFICANT CHANGE UP (ref 70–99)
GLUCOSE UR QL: 50 MG/DL
HCT VFR BLD CALC: 32.1 % — LOW (ref 34.5–45)
HGB BLD-MCNC: 11 G/DL — LOW (ref 11.5–15.5)
IMM GRANULOCYTES NFR BLD AUTO: 0.7 % — SIGNIFICANT CHANGE UP (ref 0–1.5)
KETONES UR-MCNC: ABNORMAL
LACTATE SERPL-SCNC: 0.9 MMOL/L — SIGNIFICANT CHANGE UP (ref 0.7–2)
LEUKOCYTE ESTERASE UR-ACNC: NEGATIVE — SIGNIFICANT CHANGE UP
LYMPHOCYTES # BLD AUTO: 0.71 K/UL — LOW (ref 1–3.3)
LYMPHOCYTES # BLD AUTO: 10.2 % — LOW (ref 13–44)
MAGNESIUM SERPL-MCNC: 1.6 MG/DL — SIGNIFICANT CHANGE UP (ref 1.6–2.6)
MAGNESIUM SERPL-MCNC: 1.7 MG/DL — SIGNIFICANT CHANGE UP (ref 1.6–2.6)
MCHC RBC-ENTMCNC: 29.4 PG — SIGNIFICANT CHANGE UP (ref 27–34)
MCHC RBC-ENTMCNC: 34.3 GM/DL — SIGNIFICANT CHANGE UP (ref 32–36)
MCV RBC AUTO: 85.8 FL — SIGNIFICANT CHANGE UP (ref 80–100)
MONOCYTES # BLD AUTO: 0.53 K/UL — SIGNIFICANT CHANGE UP (ref 0–0.9)
MONOCYTES NFR BLD AUTO: 7.6 % — SIGNIFICANT CHANGE UP (ref 2–14)
NEUTROPHILS # BLD AUTO: 5.57 K/UL — SIGNIFICANT CHANGE UP (ref 1.8–7.4)
NEUTROPHILS NFR BLD AUTO: 80.3 % — HIGH (ref 43–77)
NITRITE UR-MCNC: NEGATIVE — SIGNIFICANT CHANGE UP
NRBC # BLD: 0 /100 WBCS — SIGNIFICANT CHANGE UP (ref 0–0)
NT-PROBNP SERPL-SCNC: 865 PG/ML — HIGH (ref 0–450)
OSMOLALITY SERPL: 243 MOSMOL/KG — LOW (ref 280–301)
OSMOLALITY UR: 213 MOS/KG — SIGNIFICANT CHANGE UP (ref 50–1200)
OSMOLALITY UR: 365 MOS/KG — SIGNIFICANT CHANGE UP (ref 50–1200)
PH UR: 7 — SIGNIFICANT CHANGE UP (ref 5–8)
PHOSPHATE SERPL-MCNC: 2.6 MG/DL — SIGNIFICANT CHANGE UP (ref 2.5–4.5)
PLATELET # BLD AUTO: 360 K/UL — SIGNIFICANT CHANGE UP (ref 150–400)
POTASSIUM SERPL-MCNC: 3.4 MMOL/L — LOW (ref 3.5–5.3)
POTASSIUM SERPL-MCNC: 3.6 MMOL/L — SIGNIFICANT CHANGE UP (ref 3.5–5.3)
POTASSIUM SERPL-MCNC: 3.9 MMOL/L — SIGNIFICANT CHANGE UP (ref 3.5–5.3)
POTASSIUM SERPL-MCNC: 4 MMOL/L — SIGNIFICANT CHANGE UP (ref 3.5–5.3)
POTASSIUM SERPL-SCNC: 3.4 MMOL/L — LOW (ref 3.5–5.3)
POTASSIUM SERPL-SCNC: 3.6 MMOL/L — SIGNIFICANT CHANGE UP (ref 3.5–5.3)
POTASSIUM SERPL-SCNC: 3.9 MMOL/L — SIGNIFICANT CHANGE UP (ref 3.5–5.3)
POTASSIUM SERPL-SCNC: 4 MMOL/L — SIGNIFICANT CHANGE UP (ref 3.5–5.3)
PROT ?TM UR-MCNC: 7 MG/DL — SIGNIFICANT CHANGE UP (ref 0–12)
PROT SERPL-MCNC: 7.4 G/DL — SIGNIFICANT CHANGE UP (ref 6–8.3)
PROT UR-MCNC: NEGATIVE — SIGNIFICANT CHANGE UP
RBC # BLD: 3.74 M/UL — LOW (ref 3.8–5.2)
RBC # FLD: 13 % — SIGNIFICANT CHANGE UP (ref 10.3–14.5)
SARS-COV-2 RNA SPEC QL NAA+PROBE: SIGNIFICANT CHANGE UP
SODIUM SERPL-SCNC: 116 MMOL/L — CRITICAL LOW (ref 135–145)
SODIUM SERPL-SCNC: 119 MMOL/L — CRITICAL LOW (ref 135–145)
SODIUM SERPL-SCNC: 120 MMOL/L — CRITICAL LOW (ref 135–145)
SODIUM SERPL-SCNC: 123 MMOL/L — LOW (ref 135–145)
SODIUM SERPL-SCNC: 123 MMOL/L — LOW (ref 135–145)
SODIUM SERPL-SCNC: 124 MMOL/L — LOW (ref 135–145)
SODIUM UR-SCNC: 37 MMOL/L — SIGNIFICANT CHANGE UP
SODIUM UR-SCNC: 56 MMOL/L — SIGNIFICANT CHANGE UP
SP GR SPEC: 1.01 — SIGNIFICANT CHANGE UP (ref 1.01–1.02)
TROPONIN I SERPL-MCNC: <0.015 NG/ML — SIGNIFICANT CHANGE UP (ref 0–0.04)
TSH SERPL-MCNC: 6.82 UU/ML — HIGH (ref 0.34–4.82)
URATE UR-MCNC: 8.5 MG/DL — SIGNIFICANT CHANGE UP
UROBILINOGEN FLD QL: NEGATIVE — SIGNIFICANT CHANGE UP
WBC # BLD: 6.94 K/UL — SIGNIFICANT CHANGE UP (ref 3.8–10.5)
WBC # FLD AUTO: 6.94 K/UL — SIGNIFICANT CHANGE UP (ref 3.8–10.5)

## 2021-09-16 PROCEDURE — 99285 EMERGENCY DEPT VISIT HI MDM: CPT

## 2021-09-16 PROCEDURE — 71045 X-RAY EXAM CHEST 1 VIEW: CPT | Mod: 26

## 2021-09-16 PROCEDURE — 93010 ELECTROCARDIOGRAM REPORT: CPT

## 2021-09-16 RX ORDER — DESMOPRESSIN ACETATE 0.1 MG/1
1 TABLET ORAL ONCE
Refills: 0 | Status: COMPLETED | OUTPATIENT
Start: 2021-09-16 | End: 2021-09-16

## 2021-09-16 RX ORDER — DESMOPRESSIN ACETATE 0.1 MG/1
100 TABLET ORAL ONCE
Refills: 0 | Status: DISCONTINUED | OUTPATIENT
Start: 2021-09-16 | End: 2021-09-16

## 2021-09-16 RX ORDER — POTASSIUM CHLORIDE 20 MEQ
40 PACKET (EA) ORAL ONCE
Refills: 0 | Status: COMPLETED | OUTPATIENT
Start: 2021-09-16 | End: 2021-09-16

## 2021-09-16 RX ORDER — SODIUM CHLORIDE 9 MG/ML
1000 INJECTION INTRAMUSCULAR; INTRAVENOUS; SUBCUTANEOUS ONCE
Refills: 0 | Status: COMPLETED | OUTPATIENT
Start: 2021-09-16 | End: 2021-09-16

## 2021-09-16 RX ORDER — POLYETHYLENE GLYCOL 3350 17 G/17G
17 POWDER, FOR SOLUTION ORAL
Refills: 0 | Status: DISCONTINUED | OUTPATIENT
Start: 2021-09-16 | End: 2021-09-20

## 2021-09-16 RX ORDER — ONDANSETRON 8 MG/1
4 TABLET, FILM COATED ORAL EVERY 8 HOURS
Refills: 0 | Status: DISCONTINUED | OUTPATIENT
Start: 2021-09-16 | End: 2021-09-20

## 2021-09-16 RX ORDER — HYDRALAZINE HCL 50 MG
25 TABLET ORAL
Refills: 0 | Status: DISCONTINUED | OUTPATIENT
Start: 2021-09-16 | End: 2021-09-20

## 2021-09-16 RX ORDER — SODIUM CHLORIDE 9 MG/ML
1000 INJECTION, SOLUTION INTRAVENOUS
Refills: 0 | Status: COMPLETED | OUTPATIENT
Start: 2021-09-16 | End: 2021-09-16

## 2021-09-16 RX ORDER — SENNA PLUS 8.6 MG/1
2 TABLET ORAL AT BEDTIME
Refills: 0 | Status: DISCONTINUED | OUTPATIENT
Start: 2021-09-16 | End: 2021-09-20

## 2021-09-16 RX ORDER — SODIUM CHLORIDE 9 MG/ML
1000 INJECTION, SOLUTION INTRAVENOUS
Refills: 0 | Status: DISCONTINUED | OUTPATIENT
Start: 2021-09-16 | End: 2021-09-16

## 2021-09-16 RX ORDER — CHLORHEXIDINE GLUCONATE 213 G/1000ML
1 SOLUTION TOPICAL
Refills: 0 | Status: DISCONTINUED | OUTPATIENT
Start: 2021-09-16 | End: 2021-09-19

## 2021-09-16 RX ORDER — SIMVASTATIN 20 MG/1
10 TABLET, FILM COATED ORAL AT BEDTIME
Refills: 0 | Status: DISCONTINUED | OUTPATIENT
Start: 2021-09-16 | End: 2021-09-20

## 2021-09-16 RX ORDER — LEVOTHYROXINE SODIUM 125 MCG
50 TABLET ORAL DAILY
Refills: 0 | Status: DISCONTINUED | OUTPATIENT
Start: 2021-09-16 | End: 2021-09-20

## 2021-09-16 RX ORDER — ONDANSETRON 8 MG/1
4 TABLET, FILM COATED ORAL ONCE
Refills: 0 | Status: COMPLETED | OUTPATIENT
Start: 2021-09-16 | End: 2021-09-16

## 2021-09-16 RX ORDER — DESMOPRESSIN ACETATE 0.1 MG/1
0.1 TABLET ORAL ONCE
Refills: 0 | Status: DISCONTINUED | OUTPATIENT
Start: 2021-09-16 | End: 2021-09-16

## 2021-09-16 RX ORDER — AMLODIPINE BESYLATE 2.5 MG/1
5 TABLET ORAL DAILY
Refills: 0 | Status: DISCONTINUED | OUTPATIENT
Start: 2021-09-16 | End: 2021-09-20

## 2021-09-16 RX ORDER — LATANOPROST 0.05 MG/ML
1 SOLUTION/ DROPS OPHTHALMIC; TOPICAL AT BEDTIME
Refills: 0 | Status: DISCONTINUED | OUTPATIENT
Start: 2021-09-16 | End: 2021-09-20

## 2021-09-16 RX ORDER — APIXABAN 2.5 MG/1
5 TABLET, FILM COATED ORAL EVERY 12 HOURS
Refills: 0 | Status: DISCONTINUED | OUTPATIENT
Start: 2021-09-16 | End: 2021-09-20

## 2021-09-16 RX ORDER — TIMOLOL 0.5 %
1 DROPS OPHTHALMIC (EYE)
Refills: 0 | Status: DISCONTINUED | OUTPATIENT
Start: 2021-09-16 | End: 2021-09-20

## 2021-09-16 RX ADMIN — DESMOPRESSIN ACETATE 1 MICROGRAM(S): 0.1 TABLET ORAL at 12:43

## 2021-09-16 RX ADMIN — Medication 1 DROP(S): at 17:26

## 2021-09-16 RX ADMIN — ONDANSETRON 4 MILLIGRAM(S): 8 TABLET, FILM COATED ORAL at 05:57

## 2021-09-16 RX ADMIN — APIXABAN 5 MILLIGRAM(S): 2.5 TABLET, FILM COATED ORAL at 17:26

## 2021-09-16 RX ADMIN — SODIUM CHLORIDE 500 MILLILITER(S): 9 INJECTION, SOLUTION INTRAVENOUS at 12:34

## 2021-09-16 RX ADMIN — Medication 40 MILLIEQUIVALENT(S): at 21:48

## 2021-09-16 RX ADMIN — SENNA PLUS 2 TABLET(S): 8.6 TABLET ORAL at 21:48

## 2021-09-16 RX ADMIN — Medication 25 MILLIGRAM(S): at 17:25

## 2021-09-16 RX ADMIN — LATANOPROST 1 DROP(S): 0.05 SOLUTION/ DROPS OPHTHALMIC; TOPICAL at 23:59

## 2021-09-16 RX ADMIN — Medication 50 MICROGRAM(S): at 12:31

## 2021-09-16 RX ADMIN — CHLORHEXIDINE GLUCONATE 1 APPLICATION(S): 213 SOLUTION TOPICAL at 12:31

## 2021-09-16 RX ADMIN — SODIUM CHLORIDE 120 MILLILITER(S): 9 INJECTION, SOLUTION INTRAVENOUS at 13:04

## 2021-09-16 RX ADMIN — SIMVASTATIN 10 MILLIGRAM(S): 20 TABLET, FILM COATED ORAL at 21:53

## 2021-09-16 RX ADMIN — SODIUM CHLORIDE 1000 MILLILITER(S): 9 INJECTION INTRAMUSCULAR; INTRAVENOUS; SUBCUTANEOUS at 05:57

## 2021-09-16 RX ADMIN — POLYETHYLENE GLYCOL 3350 17 GRAM(S): 17 POWDER, FOR SOLUTION ORAL at 21:48

## 2021-09-16 RX ADMIN — AMLODIPINE BESYLATE 5 MILLIGRAM(S): 2.5 TABLET ORAL at 12:31

## 2021-09-16 NOTE — PROGRESS NOTE ADULT - SUBJECTIVE AND OBJECTIVE BOX
History of Present Illness: Reason for Admission: Hyponatremia History of Present Illness:  Patient is a 80y old  Female who presents with a chief complaint of nausea and vomiting  INTERVAL HPI/OVERNIGHT EVENTS: Patient is an 80F from home, lives with daughter, with PMHx of HTN, HLD, hypothyroidism, atrial fibrillation on Eliquis, recurrent hyponatremia, presenting with chief complaint of vomiting and tremors. Patient is AAOx2 and unable to provide adequate history, hence, majority of history obtained from patient's daughter, Anna. Patient admitted for hyponatremia ~1month ago, thought to be due to hyper ADH. Due to chronic back pain, patient obtained a steroid shot on  and was started on tramadol and baclofen. Day prior to admission, patient noted with severe headache and took 600mg IBUProfen and additionally, patient's blood pressure noted to be very labile, with episodes of hypertensive urgency and hypotension. This AM, patient noted with multiple episodes of vomiting and chills, and was subsequently brought to the hospital. No new medications, patient at baseline oral intake. Denied any fever, chills, cough. No chest pain, palpitations, SOB. No dysuria, hematuria, abdominal pain. Patient endorsing some nausea.  In the ED, patient noted to have a sodium concentration of 116. 1L NS was given and sodium improved to 120,  ICU Vital Signs Last 24 Hrs T(C): 36.7 (16 Sep 2021 07:32), Max: 36.7 (16 Sep 2021 07:32) T(F): 98 (16 Sep 2021 07:32), Max: 98 (16 Sep 2021 07:32) HR: 56 (16 Sep 2021 07:32) (56 - 56) BP: 159/69 (16 Sep 2021 07:32) (147/83 - 159/69) BP(mean): -- ABP: -- ABP(mean): -- RR: 17 (16 Sep 2021 07:32) (17 - 18) SpO2: 95% (16 Sep 2021 07:32) (95% - 97%)  I&O's Summary    LABS:                      11.0  6.94  )-----------( 360      ( 16 Sep 2021 05:43 )            32.1   09-16  120<LL>  |  88<L>  |  8  ----------------------------<  114<H> 3.4<L>   |  23  |  0.50  Ca    8.4      16 Sep 2021 07:24 Mg     1.6       TPro  7.4  /  Alb  3.8  /  TBili  0.6  /  DBili  x   /  AST  16  /  ALT  25  /  AlkPhos  80     Urinalysis Basic - ( 16 Sep 2021 08:21 )  Color: Yellow / Appearance: Clear / S.010 / pH: x Gluc: x / Ketone: Trace  / Bili: Negative / Urobili: Negative  Blood: x / Protein: Negative / Nitrite: Negative  Leuk Esterase: Negative / RBC: x / WBC x  Sq Epi: x / Non Sq Epi: x / Bacteria: x   CAPILLARY BLOOD GLUCOSE   POCT Blood Glucose.: 143 mg/dL (16 Sep 2021 05:30)    RADIOLOGY & ADDITIONAL TESTS:  Consultant(s) Notes Reviewed:  [x ] YES  [ ] NO  MEDICATIONS  (STANDING):  MEDICATIONS  (PRN):   PHYSICAL EXAM: GENERAL: elderly, well developed, well nourished, no acute distress,  HEAD:  Atraumatic, Normocephalic EYES: EOMI, PERRLA, conjunctiva and sclera clear, no nystagmus ENT: dry mucous membranes NECK: Supple, No JVD, Normal thyroid, no enlarged nodes NERVOUS SYSTEM:  Alert & Oriented X2, to person and place. No focal deficits CHEST/LUNG: clear to auscultation bilaterally, no rhonchi wheezes  HEART: +s1.s2 regular ABDOMEN: Soft, Nontender, Nondistended; Bowel sounds present EXTREMITIES:  2+ Peripheral Pulses, No clubbing, cyanosis, or edema LYMPH: No lymphadenopathy noted SKIN: No rashes or lesions  Care Discussed with Consultants/Other Providers [ x] YES  [ ] NO   Review of Systems: Other Review of Systems: All other review of systems negative, except as noted in HPI   Allergies and Intolerances:       Allergies: 	No Known Allergies:   Home Medications:  * Patient Currently Takes Medications as of 16-Sep-2021 09:19 documented in Structured Notes · 	levothyroxine 50 mcg (0.05 mg) oral tablet: Last Dose Taken:  , 1 tab(s) orally once a day · 	Eliquis 5 mg oral tablet: Last Dose Taken:  , 1 tab(s) orally 2 times a day · 	timolol hemihydrate 0.5% ophthalmic solution: Last Dose Taken:  , 1 drop(s) to each affected eye 2 times a day · 	latanoprost 0.005% ophthalmic solution: Last Dose Taken:  , 1 drop(s) to each affected eye once a day (in the evening) · 	amLODIPine 5 mg oral tablet: Last Dose Taken:  , 1 tab(s) orally once a day · 	hydrALAZINE 25 mg oral tablet: Last Dose Taken:  , 1 tab(s) orally 2 times a day · 	propranolol 10 mg oral tablet: Last Dose Taken:  , 1 tab(s) orally 2 times a day · 	simvastatin 10 mg oral tablet: Last Dose Taken:  , 1 tab(s) orally once a day (at bedtime) · 	amiodarone 200 mg oral tablet: Last Dose Taken:  , 0.5 tab(s) orally 2 times a day · 	traMADol 50 mg oral tablet: 1 tab(s) orally every 6 hours, As Needed · 	baclofen 10 mg oral tablet: Last Dose Taken:  , 1 tab(s) orally 3 times a day, As Needed  Patient History:   Social History: Social History (marital status, living situation, occupation, tobacco use, alcohol and drug use, and sexual history): Denies smoking, EtOHuse   Tobacco Screening: · Core Measure Site	Yes · Has the patient used tobacco in the past 30 days?	No  Risk Assessment:   Present on Admission: Deep Venous Thrombosis	no Pulmonary Embolus	no   Heart Failure: Does this patient have a history of or has been diagnosed with heart failure? unknown.   Laboratory:  General Chemistry:   16-Sep-2021 05:40, Lactate, Blood Lactate, Blood: 0.9, [0.7 - 2.0 mmol/L]   16-Sep-2021 05:43, Comprehensive Metabolic Panel Sodium, Serum:    116, [135 - 145 mmol/L], TYPE:(C=Critical, N=Notification, A=Abnormal) C 	TESTS: NA 	DATE/TIME CALLED: 2021 06:53:32 EDT 	CALLED TO: DR. TIMBO HOUSE 	READ BACK (2 Patient Identifiers)(Y/N): Y 	READ BACK VALUES (Y/N): Y 	CALLED BY: STEVEN WHITE 2021 06:53:48 EDT Potassium, Serum:    3.4, [3.5 - 5.3 mmol/L] Chloride, Serum:    84, [96 - 108 mmol/L] Carbon Dioxide, Serum: 24, [22 - 31 mmol/L] Anion Gap, Serum: 8, [5 - 17 mmol/L] Blood Urea Nitrogen, Serum: 10, [7 - 18 mg/dL] Creatinine, Serum: 0.58, [0.50 - 1.30 mg/dL] Glucose, Serum:    137, [70 - 99 mg/dL] Calcium, Total Serum: 8.7, [8.4 - 10.5 mg/dL] Protein Total, Serum: 7.4, [6.0 - 8.3 g/dL] Albumin, Serum: 3.8, [3.5 - 5.0 g/dL] Bilirubin Total, Serum: 0.6, [0.2 - 1.2 mg/dL] Alkaline Phosphatase, Serum: 80, [40 - 120 U/L] Aspartate Aminotransferase (AST/SGOT): 16, [10 - 40 U/L] Alanine Aminotransferase (ALT/SGPT): 25, [10 - 60 U/L DA] eGFR if Non African American: 86, [>=60 mL/min/1.73M2], Interpretative comment 	The units for eGFR are mL/min/1.73M2 (normalized body surface area). The 	eGFR is calculated from a serum creatinine using the CKD-EPI equation. 	Other variables required for calculation are race, age and sex. Among 	patients with chronic kidney disease (CKD), the eGFR is useful in 	determining the stage of disease according to KDOQI CKD classification. 	All eGFR results are reported numerically with the following 	interpretation. 	        GFR                    With                 Without 	   (ml/min/1.73 m2)    Kidney Damage       Kidney Damage 	      >= 90                    Stage 1                     Normal 	      60-89                    Stage 2                     Decreased GFR 	      30-59     Stage 3                     Stage 3 	      15-29                    Stage 4                     Stage 4 	      < 15                      Stage 5                     Stage 5 	Each stage of CKD assumes that the associated GFR level has been in 	effect for at least 3 months. Determination of stages one and two (with 	eGFR > 59 ml/min/m2) requires estimation of kidney damage for at least 3 	months as defined by structural or functional abnormalities. 	Limitations: All estimates of GFR will be less accurate for patients at 	extremes of muscle mass (including but not limited to frail elderly, 	critically ill, or cancer patients), those with unusual diets, and those 	with conditions associated with reduced secretion or extrarenal 	elimination of creatinine. The eGFR equation is not recommended for use 	in patients with unstable creatinine levels. eGFR if : 99, [>=60 mL/min/1.73M2]   16-Sep-2021 05:43, Magnesium, Serum Magnesium, Serum: 1.6, [1.6 - 2.6 mg/dL]   16-Sep-2021 05:43, Serum Pro-Brain Natriuretic Peptide Serum Pro-Brain Natriuretic Peptide:    865, [0 - 450 pg/mL], Interpretive guide for NT PRO-BNP 	Rule out Acute CHF < 300 pg/mL (All ages). 	Rule in Acute CHF (80-90% predictive value) 	 < 50 years old > 450 pg/mL. 	 50-75 years old > 900 pg/mL. 	 > 75 years old > 1800 pg/mL.   16-Sep-2021 05:43, Thyroid Stimulating Hormone, Serum Thyroid Stimulating Hormone, Serum:    6.82, [0.34 - 4.82 uU/mL]   16-Sep-2021 05:43, Troponin I, Serum Troponin I, Serum: <0.015, [0.000 - 0.045 ng/mL], The new reference range for Troponin-I performed on the Siemens Vista 	system is 0.015-0.045 ng/mL, which includes the 99th percentile of a 	healthy reference population. Studies have shown that elevated troponin 	levels above the 99th percentile cutoff are associated with an increased 	risk for adverse cardiac events, with the risk increasing as troponin 	levels increase. As per a joint committee of the American College of 	Cardiology and European Society of Cardiology, diagnosis of classic MI is 	based upon the detection of a rise or fall of cardiac troponin values, 	with at least one value above the 99th percentile upper reference limit, 	in the appropriate clinical context. 	Troponin-I (ng/mL) Interpretation 	0.00-0.045 Normal range (includes the 99th percentile of a healthy 	reference population) 	>0.045 Elevated troponin level indicating increased risk 	Note: Troponin-I and Troponin-T cannot be used interchangeably in serial 	measurements. Minimally elevated Troponin results should be interpreted 	in the context of clinical findings and risk factors.   16-Sep-2021 07:24, Basic Metabolic Panel Sodium, Serum:    120, [135 - 145 mmol/L], TYPE:(C=Critical, N=Notification, A=Abnormal) _C 	TESTS: _NA 	DATE/TIME CALLED: _2021 07:56:43 EDT 	CALLED TO: _RN ITZEL KATHRYN MOURA 	READ BACK (2 Patient Identifiers)(Y/N): _Y 	READ BACK VALUES (Y/N): _Y 	CALLED BY: MARIBEL 2021 07:58:05 EDT Potassium, Serum:    3.4, [3.5 - 5.3 mmol/L] Chloride, Serum:    88, [96 - 108 mmol/L] Carbon Dioxide, Serum: 23, [22 - 31 mmol/L] Anion Gap, Serum: 9, [5 - 17 mmol/L] Blood Urea Nitrogen, Serum: 8, [7 - 18 mg/dL] Creatinine, Serum: 0.50, [0.50 - 1.30 mg/dL] Glucose, Serum:    114, [70 - 99 mg/dL] Calcium, Total Serum: 8.4, [8.4 - 10.5 mg/dL] eGFR if Non African American: 90, [>=60 mL/min/1.73M2], Interpretative comment 	The units for eGFR are mL/min/1.73M2 (normalized body surface area). The 	eGFR is calculated from a serum creatinine using the CKD-EPI equation. 	Other variables required for calculation are race, age and sex. Among 	patients with chronic kidney disease (CKD), the eGFR is useful in 	determining the stage of disease according to KDOQI CKD classification. 	All eGFR results are reported numerically with the following 	interpretation. 	        GFR                    With                 Without 	   (ml/min/1.73 m2)    Kidney Damage       Kidney Damage 	      >= 90                    Stage 1                     Normal 	      60-89                    Stage 2                     Decreased GFR 	      30-59     Stage 3                     Stage 3 	      15-29                    Stage 4                     Stage 4 	      < 15                      Stage 5                     Stage 5 	Each stage of CKD assumes that the associated GFR level has been in 	effect for at least 3 months. Determination of stages one and two (with 	eGFR > 59 ml/min/m2) requires estimation of kidney damage for at least 3 	months as defined by structural or functional abnormalities. 	Limitations: All estimates of GFR will be less accurate for patients at 	extremes of muscle mass (including but not limited to frail elderly, 	critically ill, or cancer patients), those with unusual diets, and those 	with conditions associated with reduced secretion or extrarenal 	elimination of creatinine. The eGFR equation is not recommended for use 	in patients with unstable creatinine levels. eGFR if : 104, [>=60 mL/min/1.73M2]   16-Sep-2021 08:35, Osmolality, Serum Osmolality, Serum:    243, [280 - 301 mosmol/kg] Hematology:   16-Sep-2021 05:43, Complete Blood Count + Automated Diff WBC Count: 6.94, [3.80 - 10.50 K/uL] RBC Count:    3.74, [3.80 - 5.20 M/uL] Hemoglobin:    11.0, [11.5 - 15.5 g/dL] Hematocrit:    32.1, [34.5 - 45.0 %] Mean Cell Volume: 85.8, [80.0 - 100.0 fl] Mean Cell Hemoglobin: 29.4, [27.0 - 34.0 pg] Mean Cell Hemoglobin Conc: 34.3, [32.0 - 36.0 gm/dL] Red Cell Distrib Width: 13.0, [10.3 - 14.5 %] Platelet Count - Automated: 360, [150 - 400 K/uL] Auto Neutrophil #: 5.57, [1.80 - 7.40 K/uL] Auto Lymphocyte #:    0.71, [1.00 - 3.30 K/uL] Auto Monocyte #: 0.53, [0.00 - 0.90 K/uL] Auto Eosinophil #: 0.06, [0.00 - 0.50 K/uL] Auto Basophil #: 0.02, [0.00 - 0.20 K/uL] Auto Neutrophil %:    80.3, [43.0 - 77.0 %], Differential percentages must be correlated with absolute numbers for 	clinical significance. Auto Lymphocyte %:    10.2, [13.0 - 44.0 %] Auto Monocyte %: 7.6, [2.0 - 14.0 %] Auto Eosinophil %: 0.9, [0.0 - 6.0 %] Auto Basophil %: 0.3, [0.0 - 2.0 %] Auto Immature Granulocyte %: 0.7, [0.0 - 1.5 %], (Includes meta, myelo and promyelocytes) Nucleated RBC: 0, [0 - 0 /100 WBCs] Urine:   16-Sep-2021 08:21, Creatinine, Random Urine Creatinine, Random Urine: <13, [ mg/dL], Reference Ranges have NOT been established for random urine analytes due 	to variability in fluid intake and concentration.   16-Sep-2021 08:21, Osmolality, Random Urine Osmolality, Random Urine: 213, [50 - 1200 mos/kg]   16-Sep-2021 08:21, Sodium, Random Urine Sodium, Random Urine: 56, [ mmol/L], Reference Ranges have NOT been established for random urine analytes due 	to variability in fluid intake and concentration.   16-Sep-2021 08:21, Total Protein, Random Urine Total Protein, Random Urine: 7, [0 - 12 mg/dL]   16-Sep-2021 08:21, Urinalysis Color: Yellow, [Yellow] Urine Appearance: Clear, [Clear] Bilirubin: Negative, [Negative] Ketone - Urine:    Trace, [Negative] Specific Gravity: 1.010, [1.010 - 1.025] Protein, Urine: Negative, [Negative] Urobilinogen: Negative, [Negative] Nitrite: Negative, [Negative] Leukocyte Esterase Concentration: Negative, [Negative] pH Urine: 7.0, [5.0 - 8.0] Blood, Urine: Negative, [Negative] Glucose Qualitative, Urine:    50, [Negative mg/dL]  Assessment and Plan:   Assessment: · Assessment	 Assessment: -Hyponatremia - Hypertension - Hypothyroidisim - Paroxysmal Atrial Fibrillation -    Plan: Neuro: - Appears to be at baseline mental status, AAOx2. - Neurological exam unremarkable.  CV: # HTN - reported elevated BP, will continue home meds, amlodipine, hydralazine, and propranolol with parameters.  #Paroxysmal Atrial Fibrillation - Will continue rate control with propranolol, anticoagulation with Eliquis - Patient sinus on admission.  Pulm: # No acute issues - Saturating well on RA, no SOB, tachypnea. CXR clear  ID: # No acute issues - cxr unremarkable, UA clear  Nephro: # Hyponatremia  - Decreased serum osm, appears dehydrated- likely hypotonic hypovolemic hyponatremia 2/2 extra renal losses - Although urine Na increased, patient noted noted on diuretics. - na 116 on admission, improved to 120. Will hold further fluids until next BMP, will likely restart NS at that time. - Nephro, Dr. Benoit consulted  GI: # Nausea - symptomatic control w/ Zofran  Heme: #Borderline anemia - Iron studies from prior admission noted. - No indication for transfusion  Endo: #Hypothyroidism - Continue home dose levothyroxine  FEN: - Hold fluids until next BMP - Resume diet  Prophy: - resume DOAC< Eliquis  Dispo: - Transfer to ICU for further monitoring of hyponatremia.

## 2021-09-16 NOTE — ED ADULT TRIAGE NOTE - LANGUAGE ASSISTANCE NEEDED
See your MD for recheck as needed   No-Patient/Caregiver offered and refused free interpretation services.

## 2021-09-16 NOTE — ED ADULT TRIAGE NOTE - CHIEF COMPLAINT QUOTE
DAUGHTER REPORTS PT HAS GENERALIZED WEAKNESS, NAUSEA AND VOMITING X 1 DAY. PT WAS HOSPITALIZED 3 WEEKS AGO WITH SAME COMPLAINTS

## 2021-09-16 NOTE — ED ADULT NURSE NOTE - OBJECTIVE STATEMENT
pt presents to ED with due to generalized weakness. As per daughter, pt complaints of weakness with nausea and vomiting. Denies chest pain, sob, fever and chills.

## 2021-09-16 NOTE — H&P ADULT - ASSESSMENT
Assessment:  -Hyponatremia  - Hypertension  - Hypothyroidisim  - Paroxysmal Atrial Fibrillation  -      Plan:  Neuro:  - Appears to be at baseline mental status, AAOx2.  - Neurological exam unremarkable.    CV:  # HTN  - reported elevated BP, will continue home meds, amlodipine, hydralazine, and propranolol with parameters.    #Paroxysmal Atrial Fibrillation  - Will continue rate control with propranolol, anticoagulation with Eliquis  - Patient sinus on admission.    Pulm:  # No acute issues  - Saturating well on RA, no SOB, tachypnea. CXR clear    ID:  # No acute issues  - cxr unremarkable, UA clear    Nephro:  # Hyponatremia   - Decreased serum osm, appears dehydrated- likely hypotonic hypovolemic hyponatremia 2/2 extra renal losses  - Although urine Na increased, patient noted noted on diuretics.  - na 116 on admission, improved to 120. Will hold further fluids until next BMP, will likely restart NS at that time.  - Nephro, Dr. Benoit consulted    GI:  # Nausea  - symptomatic control w/ Zofran    Heme:  #Borderline anemia  - Iron studies from prior admission noted.  - No indication for transfusion    Endo:  #Hypothyroidism  - Continue home dose levothyroxine    FEN:  - Hold fluids until next BMP  - Resume diet    Prophy:  - resume DOAC< Eliquis    Dispo:  - Transfer to ICU for further monitoring of hyponatremia.

## 2021-09-16 NOTE — H&P ADULT - HISTORY OF PRESENT ILLNESS
Patient is a 80y old  Female who presents with a chief complaint of nausea and vomiting    INTERVAL HPI/OVERNIGHT EVENTS:  Patient is an 80F from home, lives with daughter, with PMHx of HTN, HLD, hypothyroidism, atrial fibrillation on Eliquis, recurrent hyponatremia, presenting with chief complaint of vomiting and tremors. Patient is AAOx2 and unable to provide adequate history, hence, majority of history obtained from patient's daughter, Anna. Patient admitted for hyponatremia ~1month ago, thought to be due to hyper ADH. Due to chronic back pain, patient obtained a steroid shot on  and was started on tramadol and baclofen. Day prior to admission, patient noted with severe headache and took 600mg IBUProfen and additionally, patient's blood pressure noted to be very labile, with episodes of hypertensive urgency and hypotension. This AM, patient noted with multiple episodes of vomiting and chills, and was subsequently brought to the hospital. No new medications, patient at baseline oral intake. Denied any fever, chills, cough. No chest pain, palpitations, SOB. No dysuria, hematuria, abdominal pain. Patient endorsing some nausea.    In the ED, patient noted to have a sodium concentration of 116. 1L NS was given and sodium improved to 120,    ICU Vital Signs Last 24 Hrs  T(C): 36.7 (16 Sep 2021 07:32), Max: 36.7 (16 Sep 2021 07:32)  T(F): 98 (16 Sep 2021 07:32), Max: 98 (16 Sep 2021 07:32)  HR: 56 (16 Sep 2021 07:32) (56 - 56)  BP: 159/69 (16 Sep 2021 07:32) (147/83 - 159/69)  BP(mean): --  ABP: --  ABP(mean): --  RR: 17 (16 Sep 2021 07:32) (17 - 18)  SpO2: 95% (16 Sep 2021 07:32) (95% - 97%)    I&O's Summary        LABS:                        11.0   6.94  )-----------( 360      ( 16 Sep 2021 05:43 )             32.1     09-16    120<LL>  |  88<L>  |  8   ----------------------------<  114<H>  3.4<L>   |  23  |  0.50    Ca    8.4      16 Sep 2021 07:24  Mg     1.6         TPro  7.4  /  Alb  3.8  /  TBili  0.6  /  DBili  x   /  AST  16  /  ALT  25  /  AlkPhos  80        Urinalysis Basic - ( 16 Sep 2021 08:21 )    Color: Yellow / Appearance: Clear / S.010 / pH: x  Gluc: x / Ketone: Trace  / Bili: Negative / Urobili: Negative   Blood: x / Protein: Negative / Nitrite: Negative   Leuk Esterase: Negative / RBC: x / WBC x   Sq Epi: x / Non Sq Epi: x / Bacteria: x      CAPILLARY BLOOD GLUCOSE      POCT Blood Glucose.: 143 mg/dL (16 Sep 2021 05:30)        RADIOLOGY & ADDITIONAL TESTS:    Consultant(s) Notes Reviewed:  [x ] YES  [ ] NO    MEDICATIONS  (STANDING):    MEDICATIONS  (PRN):      PHYSICAL EXAM:  GENERAL: elderly, well developed, well nourished, no acute distress,   HEAD:  Atraumatic, Normocephalic  EYES: EOMI, PERRLA, conjunctiva and sclera clear, no nystagmus  ENT: dry mucous membranes  NECK: Supple, No JVD, Normal thyroid, no enlarged nodes  NERVOUS SYSTEM:  Alert & Oriented X2, to person and place. No focal deficits  CHEST/LUNG: clear to auscultation bilaterally, no rhonchi wheezes   HEART: +s1.s2 regular  ABDOMEN: Soft, Nontender, Nondistended; Bowel sounds present  EXTREMITIES:  2+ Peripheral Pulses, No clubbing, cyanosis, or edema  LYMPH: No lymphadenopathy noted  SKIN: No rashes or lesions    Care Discussed with Consultants/Other Providers [ x] YES  [ ] NO Patient is a 80y old  Female who presents with a chief complaint of nausea and vomiting    INTERVAL HPI/OVERNIGHT EVENTS:  Patient is an 80F from home, lives with daughter, with PMHx of HTN, HLD, hypothyroidism, atrial fibrillation on Eliquis, recurrent hyponatremia, presenting with chief complaint of vomiting and tremors. Patient is AAOx2 and unable to provide adequate history, hence, majority of history obtained from patient's daughter, Anna. Patient admitted for hyponatremia ~1month ago, thought to be due to hyper ADH. Due to chronic back pain, patient obtained a steroid shot on  and was started on tramadol and baclofen. Day prior to admission, patient noted with severe headache and took 600mg IBUProfen and additionally, patient's blood pressure noted to be very labile, with episodes of hypertensive urgency and hypotension. This AM, patient noted with multiple episodes of vomiting and chills, and was subsequently brought to the hospital. No diuretics, No reported decreased po intake Denied any fever, chills, cough. No chest pain, palpitations, SOB. No dysuria, hematuria, abdominal pain. Patient endorsing some nausea.    In the ED, patient noted to have a sodium concentration of 116. 1L NS was given and sodium improved to 120,    ICU Vital Signs Last 24 Hrs  T(C): 36.7 (16 Sep 2021 07:32), Max: 36.7 (16 Sep 2021 07:32)  T(F): 98 (16 Sep 2021 07:32), Max: 98 (16 Sep 2021 07:32)  HR: 56 (16 Sep 2021 07:32) (56 - 56)  BP: 159/69 (16 Sep 2021 07:32) (147/83 - 159/69)  BP(mean): --  ABP: --  ABP(mean): --  RR: 17 (16 Sep 2021 07:32) (17 - 18)  SpO2: 95% (16 Sep 2021 07:32) (95% - 97%)    I&O's Summary        LABS:                        11.0   6.94  )-----------( 360      ( 16 Sep 2021 05:43 )             32.1     09-16    120<LL>  |  88<L>  |  8   ----------------------------<  114<H>  3.4<L>   |  23  |  0.50    Ca    8.4      16 Sep 2021 07:24  Mg     1.6         TPro  7.4  /  Alb  3.8  /  TBili  0.6  /  DBili  x   /  AST  16  /  ALT  25  /  AlkPhos  80        Urinalysis Basic - ( 16 Sep 2021 08:21 )    Color: Yellow / Appearance: Clear / S.010 / pH: x  Gluc: x / Ketone: Trace  / Bili: Negative / Urobili: Negative   Blood: x / Protein: Negative / Nitrite: Negative   Leuk Esterase: Negative / RBC: x / WBC x   Sq Epi: x / Non Sq Epi: x / Bacteria: x      CAPILLARY BLOOD GLUCOSE      POCT Blood Glucose.: 143 mg/dL (16 Sep 2021 05:30)        RADIOLOGY & ADDITIONAL TESTS:    Consultant(s) Notes Reviewed:  [x ] YES  [ ] NO    MEDICATIONS  (STANDING):    MEDICATIONS  (PRN):      PHYSICAL EXAM:  GENERAL: elderly, well developed, well nourished, no acute distress,   HEAD:  Atraumatic, Normocephalic  EYES: EOMI, PERRLA, conjunctiva and sclera clear, no nystagmus  ENT: dry mucous membranes  NECK: Supple, No JVD, Normal thyroid, no enlarged nodes  NERVOUS SYSTEM:  Alert & Oriented X2, to person and place. No focal deficits  CHEST/LUNG: clear to auscultation bilaterally, no rhonchi wheezes   HEART: +s1.s2 regular  ABDOMEN: Soft, Nontender, Nondistended; Bowel sounds present  EXTREMITIES:  2+ Peripheral Pulses, No clubbing, cyanosis, or edema  LYMPH: No lymphadenopathy noted  SKIN: No rashes or lesions    Care Discussed with Consultants/Other Providers [ x] YES  [ ] NO

## 2021-09-16 NOTE — H&P ADULT - ATTENDING COMMENTS
Assessment:    -Hyponatremia symptomatic   - Hypertension  - Hypothyroidism  - Paroxysmal Atrial Fibrillation  -        Plan   -admit to icu   -probable hypovolemic hypotonic hyponatremia due to dehydration   -goal of Na correction 8 mEq/ 24 hr   -avoid over correction   -BMP q4h  -hemodynamic monitoring   -Neph eval   -Hyponatremia eval   -Endo eval .. this is second episode of hyponatremia in 4 weeks   -fall precaution   -dvt/gi prophy  -diet

## 2021-09-16 NOTE — ED PROVIDER NOTE - CLINICAL SUMMARY MEDICAL DECISION MAKING FREE TEXT BOX
80 year old female with weakness and N/V. PE as above.  labs, cxr, ecg, fluid bolus, zofran, reassess

## 2021-09-16 NOTE — ED PROVIDER NOTE - OBJECTIVE STATEMENT
80 year old female PMH HTN, HLD, hypothyroid, afib coming in with generalized weakness with associated nausea and multiple episodes of nbnb vomiting for 1 day. few weeks ago pt was admitted to ICU for same which was 2/2 uti and severe hyponatremia. daughter was concerned it could be the same thing again so called EMS to bring pt to the ed.

## 2021-09-17 DIAGNOSIS — E87.1 HYPO-OSMOLALITY AND HYPONATREMIA: ICD-10-CM

## 2021-09-17 DIAGNOSIS — E03.9 HYPOTHYROIDISM, UNSPECIFIED: ICD-10-CM

## 2021-09-17 LAB
ALBUMIN SERPL ELPH-MCNC: 3.2 G/DL — LOW (ref 3.5–5)
ALP SERPL-CCNC: 76 U/L — SIGNIFICANT CHANGE UP (ref 40–120)
ALT FLD-CCNC: 23 U/L DA — SIGNIFICANT CHANGE UP (ref 10–60)
ANION GAP SERPL CALC-SCNC: 7 MMOL/L — SIGNIFICANT CHANGE UP (ref 5–17)
ANION GAP SERPL CALC-SCNC: 8 MMOL/L — SIGNIFICANT CHANGE UP (ref 5–17)
ANION GAP SERPL CALC-SCNC: 8 MMOL/L — SIGNIFICANT CHANGE UP (ref 5–17)
AST SERPL-CCNC: 16 U/L — SIGNIFICANT CHANGE UP (ref 10–40)
BASOPHILS # BLD AUTO: 0.02 K/UL — SIGNIFICANT CHANGE UP (ref 0–0.2)
BASOPHILS NFR BLD AUTO: 0.3 % — SIGNIFICANT CHANGE UP (ref 0–2)
BILIRUB SERPL-MCNC: 0.5 MG/DL — SIGNIFICANT CHANGE UP (ref 0.2–1.2)
BUN SERPL-MCNC: 14 MG/DL — SIGNIFICANT CHANGE UP (ref 7–18)
BUN SERPL-MCNC: 7 MG/DL — SIGNIFICANT CHANGE UP (ref 7–18)
BUN SERPL-MCNC: 9 MG/DL — SIGNIFICANT CHANGE UP (ref 7–18)
CALCIUM SERPL-MCNC: 8.7 MG/DL — SIGNIFICANT CHANGE UP (ref 8.4–10.5)
CALCIUM SERPL-MCNC: 8.8 MG/DL — SIGNIFICANT CHANGE UP (ref 8.4–10.5)
CALCIUM SERPL-MCNC: 9 MG/DL — SIGNIFICANT CHANGE UP (ref 8.4–10.5)
CHLORIDE SERPL-SCNC: 88 MMOL/L — LOW (ref 96–108)
CHLORIDE SERPL-SCNC: 88 MMOL/L — LOW (ref 96–108)
CHLORIDE SERPL-SCNC: 90 MMOL/L — LOW (ref 96–108)
CO2 SERPL-SCNC: 24 MMOL/L — SIGNIFICANT CHANGE UP (ref 22–31)
CO2 SERPL-SCNC: 25 MMOL/L — SIGNIFICANT CHANGE UP (ref 22–31)
CO2 SERPL-SCNC: 25 MMOL/L — SIGNIFICANT CHANGE UP (ref 22–31)
COVID-19 SPIKE DOMAIN AB INTERP: POSITIVE
COVID-19 SPIKE DOMAIN ANTIBODY RESULT: 1.03 U/ML — HIGH
CREAT SERPL-MCNC: 0.6 MG/DL — SIGNIFICANT CHANGE UP (ref 0.5–1.3)
CREAT SERPL-MCNC: 0.7 MG/DL — SIGNIFICANT CHANGE UP (ref 0.5–1.3)
CREAT SERPL-MCNC: 0.78 MG/DL — SIGNIFICANT CHANGE UP (ref 0.5–1.3)
CULTURE RESULTS: NO GROWTH — SIGNIFICANT CHANGE UP
EOSINOPHIL # BLD AUTO: 0.15 K/UL — SIGNIFICANT CHANGE UP (ref 0–0.5)
EOSINOPHIL NFR BLD AUTO: 2.6 % — SIGNIFICANT CHANGE UP (ref 0–6)
GLUCOSE SERPL-MCNC: 106 MG/DL — HIGH (ref 70–99)
GLUCOSE SERPL-MCNC: 115 MG/DL — HIGH (ref 70–99)
GLUCOSE SERPL-MCNC: 91 MG/DL — SIGNIFICANT CHANGE UP (ref 70–99)
HCT VFR BLD CALC: 32.4 % — LOW (ref 34.5–45)
HGB BLD-MCNC: 11.1 G/DL — LOW (ref 11.5–15.5)
IMM GRANULOCYTES NFR BLD AUTO: 0.7 % — SIGNIFICANT CHANGE UP (ref 0–1.5)
LYMPHOCYTES # BLD AUTO: 0.97 K/UL — LOW (ref 1–3.3)
LYMPHOCYTES # BLD AUTO: 16.8 % — SIGNIFICANT CHANGE UP (ref 13–44)
MAGNESIUM SERPL-MCNC: 1.8 MG/DL — SIGNIFICANT CHANGE UP (ref 1.6–2.6)
MCHC RBC-ENTMCNC: 29.2 PG — SIGNIFICANT CHANGE UP (ref 27–34)
MCHC RBC-ENTMCNC: 34.3 GM/DL — SIGNIFICANT CHANGE UP (ref 32–36)
MCV RBC AUTO: 85.3 FL — SIGNIFICANT CHANGE UP (ref 80–100)
MONOCYTES # BLD AUTO: 0.69 K/UL — SIGNIFICANT CHANGE UP (ref 0–0.9)
MONOCYTES NFR BLD AUTO: 12 % — SIGNIFICANT CHANGE UP (ref 2–14)
MRSA PCR RESULT.: SIGNIFICANT CHANGE UP
NEUTROPHILS # BLD AUTO: 3.9 K/UL — SIGNIFICANT CHANGE UP (ref 1.8–7.4)
NEUTROPHILS NFR BLD AUTO: 67.6 % — SIGNIFICANT CHANGE UP (ref 43–77)
NRBC # BLD: 0 /100 WBCS — SIGNIFICANT CHANGE UP (ref 0–0)
OSMOLALITY UR: 291 MOS/KG — SIGNIFICANT CHANGE UP (ref 50–1200)
PHOSPHATE SERPL-MCNC: 2.4 MG/DL — LOW (ref 2.5–4.5)
PHOSPHATE SERPL-MCNC: 3.4 MG/DL — SIGNIFICANT CHANGE UP (ref 2.5–4.5)
PLATELET # BLD AUTO: 366 K/UL — SIGNIFICANT CHANGE UP (ref 150–400)
POTASSIUM SERPL-MCNC: 4 MMOL/L — SIGNIFICANT CHANGE UP (ref 3.5–5.3)
POTASSIUM SERPL-MCNC: 4.1 MMOL/L — SIGNIFICANT CHANGE UP (ref 3.5–5.3)
POTASSIUM SERPL-MCNC: 4.5 MMOL/L — SIGNIFICANT CHANGE UP (ref 3.5–5.3)
POTASSIUM SERPL-SCNC: 4 MMOL/L — SIGNIFICANT CHANGE UP (ref 3.5–5.3)
POTASSIUM SERPL-SCNC: 4.1 MMOL/L — SIGNIFICANT CHANGE UP (ref 3.5–5.3)
POTASSIUM SERPL-SCNC: 4.5 MMOL/L — SIGNIFICANT CHANGE UP (ref 3.5–5.3)
PROT SERPL-MCNC: 6.9 G/DL — SIGNIFICANT CHANGE UP (ref 6–8.3)
RBC # BLD: 3.8 M/UL — SIGNIFICANT CHANGE UP (ref 3.8–5.2)
RBC # FLD: 13.2 % — SIGNIFICANT CHANGE UP (ref 10.3–14.5)
S AUREUS DNA NOSE QL NAA+PROBE: SIGNIFICANT CHANGE UP
SARS-COV-2 IGG+IGM SERPL QL IA: 1.03 U/ML — HIGH
SARS-COV-2 IGG+IGM SERPL QL IA: POSITIVE
SODIUM SERPL-SCNC: 121 MMOL/L — LOW (ref 135–145)
SODIUM UR-SCNC: 54 MMOL/L — SIGNIFICANT CHANGE UP
SPECIMEN SOURCE: SIGNIFICANT CHANGE UP
WBC # BLD: 5.77 K/UL — SIGNIFICANT CHANGE UP (ref 3.8–10.5)
WBC # FLD AUTO: 5.77 K/UL — SIGNIFICANT CHANGE UP (ref 3.8–10.5)

## 2021-09-17 RX ORDER — SODIUM CHLORIDE 9 MG/ML
1 INJECTION INTRAMUSCULAR; INTRAVENOUS; SUBCUTANEOUS
Refills: 0 | Status: DISCONTINUED | OUTPATIENT
Start: 2021-09-17 | End: 2021-09-18

## 2021-09-17 RX ORDER — POTASSIUM PHOSPHATE, MONOBASIC POTASSIUM PHOSPHATE, DIBASIC 236; 224 MG/ML; MG/ML
15 INJECTION, SOLUTION INTRAVENOUS ONCE
Refills: 0 | Status: COMPLETED | OUTPATIENT
Start: 2021-09-17 | End: 2021-09-17

## 2021-09-17 RX ADMIN — APIXABAN 5 MILLIGRAM(S): 2.5 TABLET, FILM COATED ORAL at 17:49

## 2021-09-17 RX ADMIN — Medication 1 DROP(S): at 17:50

## 2021-09-17 RX ADMIN — CHLORHEXIDINE GLUCONATE 1 APPLICATION(S): 213 SOLUTION TOPICAL at 05:41

## 2021-09-17 RX ADMIN — SODIUM CHLORIDE 1 GRAM(S): 9 INJECTION INTRAMUSCULAR; INTRAVENOUS; SUBCUTANEOUS at 17:49

## 2021-09-17 RX ADMIN — Medication 50 MICROGRAM(S): at 05:35

## 2021-09-17 RX ADMIN — SIMVASTATIN 10 MILLIGRAM(S): 20 TABLET, FILM COATED ORAL at 21:29

## 2021-09-17 RX ADMIN — Medication 1 DROP(S): at 05:41

## 2021-09-17 RX ADMIN — POLYETHYLENE GLYCOL 3350 17 GRAM(S): 17 POWDER, FOR SOLUTION ORAL at 17:48

## 2021-09-17 RX ADMIN — APIXABAN 5 MILLIGRAM(S): 2.5 TABLET, FILM COATED ORAL at 05:35

## 2021-09-17 RX ADMIN — LATANOPROST 1 DROP(S): 0.05 SOLUTION/ DROPS OPHTHALMIC; TOPICAL at 21:29

## 2021-09-17 RX ADMIN — POTASSIUM PHOSPHATE, MONOBASIC POTASSIUM PHOSPHATE, DIBASIC 62.5 MILLIMOLE(S): 236; 224 INJECTION, SOLUTION INTRAVENOUS at 05:38

## 2021-09-17 RX ADMIN — POLYETHYLENE GLYCOL 3350 17 GRAM(S): 17 POWDER, FOR SOLUTION ORAL at 05:41

## 2021-09-17 RX ADMIN — AMLODIPINE BESYLATE 5 MILLIGRAM(S): 2.5 TABLET ORAL at 05:35

## 2021-09-17 RX ADMIN — Medication 25 MILLIGRAM(S): at 17:49

## 2021-09-17 RX ADMIN — Medication 25 MILLIGRAM(S): at 05:35

## 2021-09-17 RX ADMIN — SENNA PLUS 2 TABLET(S): 8.6 TABLET ORAL at 21:29

## 2021-09-17 NOTE — PROGRESS NOTE ADULT - ASSESSMENT
80y Female with history of HTN, afib presents with vomiting and tremors. Nephrology consulted for hyponatremia.    1) Hyponatremia: Hypotonic hyponatremia. Repeat urine studies consistent with increased ADH state from nausea and vomiting. Pt given 1L NS in ER with overcorrection s/p DDAVP and D5 IVF. Repeat serum Na stable at 121 off IVF. Recc to start NaCl 1g PO bid. Check BMP q 8hrs; goal Na 124-128 this evening. Monitor urine o/p.   Elevated TSH- Endocrine following. Check AM cortisol. Repeat urine sodium, urine osmolality and serum osmolality in am. Monitor serum sodium.    2) HTN: BP controlled. Continue with current anti-hypertensive medications. Monitor BP.    3) Nausea/ vomiting: resolved. c/w Zofran prn.     4) Hypothyroidism: Pt on Synthroid 50mcg PO qd. Endocrine following.       [facility] NEPHROLOGY  Manuel Clay M.D.  Mark Slaughter D.O.  Marj Benoit M.D.  Shamika Lopez, MSN, ANP-C    Telephone: [phone]  Facsimile: [phone]    [address] [place] [id number]

## 2021-09-17 NOTE — PROGRESS NOTE ADULT - SUBJECTIVE AND OBJECTIVE BOX
Reason for Admission: · Reason for Admission	Hyponatremia   · Subjective and Objective:   History of Present Illness: Reason for Admission: Hyponatremia History of Present Illness:  Patient is a 80y old  Female who presents with a chief complaint of nausea and vomiting  INTERVAL HPI/OVERNIGHT EVENTS: Patient is an 80F from home, lives with daughter, with PMHx of HTN, HLD, hypothyroidism, atrial fibrillation on Eliquis, recurrent hyponatremia, presenting with chief complaint of vomiting and tremors. Patient is AAOx2 and unable to provide adequate history, hence, majority of history obtained from patient's daughter, Anna. Patient admitted for hyponatremia ~1month ago, thought to be due to hyper ADH. Due to chronic back pain, patient obtained a steroid shot on  and was started on tramadol and baclofen. Day prior to admission, patient noted with severe headache and took 600mg IBUProfen and additionally, patient's blood pressure noted to be very labile, with episodes of hypertensive urgency and hypotension. This AM, patient noted with multiple episodes of vomiting and chills, and was subsequently brought to the hospital. No new medications, patient at baseline oral intake. Denied any fever, chills, cough. No chest pain, palpitations, SOB. No dysuria, hematuria, abdominal pain. Patient endorsing some nausea.  In the ED, patient noted to have a sodium concentration of 116. 1L NS was given and sodium improved to 120,  ICU Vital Signs Last 24 Hrs T(C): 36.7 (16 Sep 2021 07:32), Max: 36.7 (16 Sep 2021 07:32) T(F): 98 (16 Sep 2021 07:32), Max: 98 (16 Sep 2021 07:32) HR: 56 (16 Sep 2021 07:32) (56 - 56) BP: 159/69 (16 Sep 2021 07:32) (147/83 - 159/69) BP(mean): -- ABP: -- ABP(mean): -- RR: 17 (16 Sep 2021 07:32) (17 - 18) SpO2: 95% (16 Sep 2021 07:32) (95% - 97%)  I&O's Summary    LABS:                      11.0  6.94  )-----------( 360      ( 16 Sep 2021 05:43 )            32.1   09-16  120<LL>  |  88<L>  |  8  ----------------------------<  114<H> 3.4<L>   |  23  |  0.50  Ca    8.4      16 Sep 2021 07:24 Mg     1.6       TPro  7.4  /  Alb  3.8  /  TBili  0.6  /  DBili  x   /  AST  16  /  ALT  25  /  AlkPhos  80     Urinalysis Basic - ( 16 Sep 2021 08:21 )  Color: Yellow / Appearance: Clear / S.010 / pH: x Gluc: x / Ketone: Trace  / Bili: Negative / Urobili: Negative  Blood: x / Protein: Negative / Nitrite: Negative  Leuk Esterase: Negative / RBC: x / WBC x  Sq Epi: x / Non Sq Epi: x / Bacteria: x   CAPILLARY BLOOD GLUCOSE   POCT Blood Glucose.: 143 mg/dL (16 Sep 2021 05:30)    RADIOLOGY & ADDITIONAL TESTS:  Consultant(s) Notes Reviewed:  [x ] YES  [ ] NO  MEDICATIONS  (STANDING):  MEDICATIONS  (PRN):   PHYSICAL EXAM: GENERAL: elderly, well developed, well nourished, no acute distress,  HEAD:  Atraumatic, Normocephalic EYES: EOMI, PERRLA, conjunctiva and sclera clear, no nystagmus ENT: dry mucous membranes NECK: Supple, No JVD, Normal thyroid, no enlarged nodes NERVOUS SYSTEM:  Alert & Oriented X2, to person and place. No focal deficits CHEST/LUNG: clear to auscultation bilaterally, no rhonchi wheezes  HEART: +s1.s2 regular ABDOMEN: Soft, Nontender, Nondistended; Bowel sounds present EXTREMITIES:  2+ Peripheral Pulses, No clubbing, cyanosis, or edema LYMPH: No lymphadenopathy noted SKIN: No rashes or lesions  Care Discussed with Consultants/Other Providers [ x] YES  [ ] NO   Review of Systems: Other Review of Systems: All other review of systems negative, except as noted in HPI   Allergies and Intolerances:       Allergies: 	No Known Allergies:   Home Medications:  * Patient Currently Takes Medications as of 16-Sep-2021 09:19 documented in Structured Notes · 	levothyroxine 50 mcg (0.05 mg) oral tablet: Last Dose Taken:  , 1 tab(s) orally once a day · 	Eliquis 5 mg oral tablet: Last Dose Taken:  , 1 tab(s) orally 2 times a day · 	timolol hemihydrate 0.5% ophthalmic solution: Last Dose Taken:  , 1 drop(s) to each affected eye 2 times a day · 	latanoprost 0.005% ophthalmic solution: Last Dose Taken:  , 1 drop(s) to each affected eye once a day (in the evening) · 	amLODIPine 5 mg oral tablet: Last Dose Taken:  , 1 tab(s) orally once a day · 	hydrALAZINE 25 mg oral tablet: Last Dose Taken:  , 1 tab(s) orally 2 times a day · 	propranolol 10 mg oral tablet: Last Dose Taken:  , 1 tab(s) orally 2 times a day · 	simvastatin 10 mg oral tablet: Last Dose Taken:  , 1 tab(s) orally once a day (at bedtime) · 	amiodarone 200 mg oral tablet: Last Dose Taken:  , 0.5 tab(s) orally 2 times a day · 	traMADol 50 mg oral tablet: 1 tab(s) orally every 6 hours, As Needed · 	baclofen 10 mg oral tablet: Last Dose Taken:  , 1 tab(s) orally 3 times a day, As Needed  Patient History:   Social History: Social History (marital status, living situation, occupation, tobacco use, alcohol and drug use, and sexual history): Denies smoking, EtOHuse   Tobacco Screening: · Core Measure Site	Yes · Has the patient used tobacco in the past 30 days?	No  Risk Assessment:   Present on Admission: Deep Venous Thrombosis	no Pulmonary Embolus	no   Heart Failure: Does this patient have a history of or has been diagnosed with heart failure? unknown.   Laboratory:  General Chemistry:   16-Sep-2021 05:40, Lactate, Blood Lactate, Blood: 0.9, [0.7 - 2.0 mmol/L]   16-Sep-2021 05:43, Comprehensive Metabolic Panel Sodium, Serum:    116, [135 - 145 mmol/L], TYPE:(C=Critical, N=Notification, A=Abnormal) C 	TESTS: NA 	DATE/TIME CALLED: 2021 06:53:32 EDT 	CALLED TO: DR. TIMBO HOUSE 	READ BACK (2 Patient Identifiers)(Y/N): Y 	READ BACK VALUES (Y/N): Y 	CALLED BY: STEVEN WHITE 2021 06:53:48 EDT Potassium, Serum:    3.4, [3.5 - 5.3 mmol/L] Chloride, Serum:    84, [96 - 108 mmol/L] Carbon Dioxide, Serum: 24, [22 - 31 mmol/L] Anion Gap, Serum: 8, [5 - 17 mmol/L] Blood Urea Nitrogen, Serum: 10, [7 - 18 mg/dL] Creatinine, Serum: 0.58, [0.50 - 1.30 mg/dL] Glucose, Serum:    137, [70 - 99 mg/dL] Calcium, Total Serum: 8.7, [8.4 - 10.5 mg/dL] Protein Total, Serum: 7.4, [6.0 - 8.3 g/dL] Albumin, Serum: 3.8, [3.5 - 5.0 g/dL] Bilirubin Total, Serum: 0.6, [0.2 - 1.2 mg/dL] Alkaline Phosphatase, Serum: 80, [40 - 120 U/L] Aspartate Aminotransferase (AST/SGOT): 16, [10 - 40 U/L] Alanine Aminotransferase (ALT/SGPT): 25, [10 - 60 U/L DA] eGFR if Non African American: 86, [>=60 mL/min/1.73M2], Interpretative comment 	The units for eGFR are mL/min/1.73M2 (normalized body surface area). The 	eGFR is calculated from a serum creatinine using the CKD-EPI equation. 	Other variables required for calculation are race, age and sex. Among 	patients with chronic kidney disease (CKD), the eGFR is useful in 	determining the stage of disease according to KDOQI CKD classification. 	All eGFR results are reported numerically with the following 	interpretation. 	        GFR                    With                 Without 	   (ml/min/1.73 m2)    Kidney Damage       Kidney Damage 	      >= 90                    Stage 1                     Normal 	      60-89                    Stage 2                     Decreased GFR 	      30-59     Stage 3                     Stage 3 	      15-29                    Stage 4                     Stage 4 	      < 15                      Stage 5                     Stage 5 	Each stage of CKD assumes that the associated GFR level has been in 	effect for at least 3 months. Determination of stages one and two (with 	eGFR > 59 ml/min/m2) requires estimation of kidney damage for at least 3 	months as defined by structural or functional abnormalities. 	Limitations: All estimates of GFR will be less accurate for patients at 	extremes of muscle mass (including but not limited to frail elderly, 	critically ill, or cancer patients), those with unusual diets, and those 	with conditions associated with reduced secretion or extrarenal 	elimination of creatinine. The eGFR equation is not recommended for use 	in patients with unstable creatinine levels. eGFR if : 99, [>=60 mL/min/1.73M2]   16-Sep-2021 05:43, Magnesium, Serum Magnesium, Serum: 1.6, [1.6 - 2.6 mg/dL]   16-Sep-2021 05:43, Serum Pro-Brain Natriuretic Peptide Serum Pro-Brain Natriuretic Peptide:    865, [0 - 450 pg/mL], Interpretive guide for NT PRO-BNP 	Rule out Acute CHF < 300 pg/mL (All ages). 	Rule in Acute CHF (80-90% predictive value) 	 < 50 years old > 450 pg/mL. 	 50-75 years old > 900 pg/mL. 	 > 75 years old > 1800 pg/mL.   16-Sep-2021 05:43, Thyroid Stimulating Hormone, Serum Thyroid Stimulating Hormone, Serum:    6.82, [0.34 - 4.82 uU/mL]   16-Sep-2021 05:43, Troponin I, Serum Troponin I, Serum: <0.015, [0.000 - 0.045 ng/mL], The new reference range for Troponin-I performed on the Siemens Vista 	system is 0.015-0.045 ng/mL, which includes the 99th percentile of a 	healthy reference population. Studies have shown that elevated troponin 	levels above the 99th percentile cutoff are associated with an increased 	risk for adverse cardiac events, with the risk increasing as troponin 	levels increase. As per a joint committee of the American College of 	Cardiology and European Society of Cardiology, diagnosis of classic MI is 	based upon the detection of a rise or fall of cardiac troponin values, 	with at least one value above the 99th percentile upper reference limit, 	in the appropriate clinical context. 	Troponin-I (ng/mL) Interpretation 	0.00-0.045 Normal range (includes the 99th percentile of a healthy 	reference population) 	>0.045 Elevated troponin level indicating increased risk 	Note: Troponin-I and Troponin-T cannot be used interchangeably in serial 	measurements. Minimally elevated Troponin results should be interpreted 	in the context of clinical findings and risk factors.   16-Sep-2021 07:24, Basic Metabolic Panel Sodium, Serum:    120, [135 - 145 mmol/L], TYPE:(C=Critical, N=Notification, A=Abnormal) _C 	TESTS: _NA 	DATE/TIME CALLED: _2021 07:56:43 EDT 	CALLED TO: _LEONARD MOURA 	READ BACK (2 Patient Identifiers)(Y/N): _Y 	READ BACK VALUES (Y/N): _Y 	CALLED BY: MARIBEL 2021 07:58:05 EDT Potassium, Serum:    3.4, [3.5 - 5.3 mmol/L] Chloride, Serum:    88, [96 - 108 mmol/L] Carbon Dioxide, Serum: 23, [22 - 31 mmol/L] Anion Gap, Serum: 9, [5 - 17 mmol/L] Blood Urea Nitrogen, Serum: 8, [7 - 18 mg/dL] Creatinine, Serum: 0.50, [0.50 - 1.30 mg/dL] Glucose, Serum:    114, [70 - 99 mg/dL] Calcium, Total Serum: 8.4, [8.4 - 10.5 mg/dL]  eGFR if : 104, [>=60 mL/min/1.73M2]   16-Sep-2021 08:35, Osmolality, Serum Osmolality, Serum:    243, [280 - 301 mosmol/kg] Hematology:   16-Sep-2021 05:43, Complete Blood Count + Automated Diff WBC Count: 6.94, [3.80 - 10.50 K/uL] RBC Count:    3.74, [3.80 - 5.20 M/uL] Hemoglobin:    11.0, [11.5 - 15.5 g/dL] Hematocrit:    32.1, [34.5 - 45.0 %] Mean Cell Volume: 85.8, [80.0 - 100.0 fl] Mean Cell Hemoglobin: 29.4, [27.0 - 34.0 pg] Mean Cell Hemoglobin Conc: 34.3, [32.0 - 36.0 gm/dL] Red Cell Distrib Width: 13.0, [10.3 - 14.5 %] Platelet Count - Automated: 360, [150 - 400 K/uL] Auto Neutrophil #: 5.57, [1.80 - 7.40 K/uL] Auto Lymphocyte #:    0.71, [1.00 - 3.30 K/uL] Auto Monocyte #: 0.53, [0.00 - 0.90 K/uL] Auto Eosinophil #: 0.06, [0.00 - 0.50 K/uL] Auto Basophil #: 0.02, [0.00 - 0.20 K/uL] Auto Neutrophil %:    80.3, [43.0 - 77.0 %], Differential percentages must be correlated with absolute numbers for 	clinical significance. Auto Lymphocyte %:    10.2, [13.0 - 44.0 %] Auto Monocyte %: 7.6, [2.0 - 14.0 %] Auto Eosinophil %: 0.9, [0.0 - 6.0 %] Auto Basophil %: 0.3, [0.0 - 2.0 %] Auto Immature Granulocyte %: 0.7, [0.0 - 1.5 %], (Includes meta, myelo and promyelocytes) Nucleated RBC: 0, [0 - 0 /100 WBCs] Urine:   16-Sep-2021 08:21, Creatinine, Random Urine Creatinine, Random Urine: <13, [ mg/dL], Reference Ranges have NOT been established for random urine analytes due 	to variability in fluid intake and concentration.   16-Sep-2021 08:21, Osmolality, Random Urine Osmolality, Random Urine: 213, [50 - 1200 mos/kg]   16-Sep-2021 08:21, Sodium, Random Urine Sodium, Random Urine: 56, [ mmol/L], Reference Ranges have NOT been established for random urine analytes due 	to variability in fluid intake and concentration.    16-Sep-2021 08:21, Total Protein, Random Urine Total Protein, Random Urine: 7, [0 - 12 mg/dL]   16-Sep-2021 08:21, Urinalysis Color: Yellow, [Yellow] Urine Appearance: Clear, [Clear] Bilirubin: Negative, [Negative] Ketone - Urine:    Trace, [Negative] Specific Gravity: 1.010, [1.010 - 1.025] Protein, Urine: Negative, [Negative] Urobilinogen: Negative, [Negative] Nitrite: Negative, [Negative] Leukocyte Esterase Concentration: Negative, [Negative] pH Urine: 7.0, [5.0 - 8.0] Blood, Urine: Negative, [Negative] Glucose Qualitative, Urine:    50, [Negative mg/dL]  Assessment and Plan:   Assessment: · Assessment	 Assessment: -Hyponatremia - Hypertension - Hypothyroidisim - Paroxysmal Atrial Fibrillation -    Plan: Neuro: - Appears to be at baseline mental status, AAOx2. - Neurological exam unremarkable.  CV: # HTN - reported elevated BP, will continue home meds, amlodipine, hydralazine, and propranolol with parameters.  #Paroxysmal Atrial Fibrillation - Will continue rate control with propranolol, anticoagulation with Eliquis - Patient sinus on admission.  Pulm: # No acute issues - Saturating well on RA, no SOB, tachypnea. CXR clear  ID: # No acute issues - cxr unremarkable, UA clear  Nephro: # Hyponatremia  - Decreased serum osm, appears dehydrated- likely hypotonic hypovolemic hyponatremia 2/2 extra renal losses - Although urine Na increased, patient noted noted on diuretics. - na 116 on admission, improved to 120. Will hold further fluids until next BMP, will likely restart NS at that time. - Nephro, Dr. Benoit consulted -endocrinology f/up pending-DR Ready  GI: # Nausea - symptomatic control w/ Zofran  Heme: #Borderline anemia - Iron studies from prior admission noted. - No indication for transfusion  Endo: #Hypothyroidism - Continue home dose levothyroxine  FEN: - Hold fluids until next BMP - Resume diet  Prophy: - resume DOAC< Eliquis  Dispo: - Transfer to ICU for further monitoring of hyponatremia.

## 2021-09-17 NOTE — CONSULT NOTE ADULT - ASSESSMENT
80y Female with history of HTN, afib presents with vomiting and tremors. Nephrology consulted for hyponatremia.    1) Hyponatremia: Hypotonic hyponatremia. Repeat urine studies consistent with increased ADH state from nausea and vomiting. Pt given 1L NS and now with increased UO leading to overcorrection. s/p DDAVP 1mcg IV x1 @12 and s/p D5 500cc IV bolus and now on 120 ml/hr. Monitor urine o/p and increase D5 to match urine o/p.   Repeat serial Na with goal Na 120-122 this evening with goal to obtain Na 122-124 by 5 am (9/17). Monitor UO and if increases, would give additional DDAVP (1mcg IV~ 8pm).  Elevated TSH- check T3/ T4; will defer to primary team. Check AM cortisol. Check urine sodium, urine osmolality and serum osmolality in am. Monitor serum sodium.    2) HTN: BP controlled. Continue with current anti-hypertensive medications. Monitor BP.    3) Nausea/ vomiting: resolved. c/w Zofran prn.     4) Hypothyroidism: Pt on Synthroid 50mcg PO qd. Plan as per primary team.       [facility] NEPHROLOGY  Manuel Clay M.D.  Mark Slaughter D.O.  Marj Benoit M.D.  Shamika Lopez, MSN, ANP-C    Telephone: [phone]  Facsimile: [phone]    [address] [place] [address]  
Patient is an 80F from home, lives with daughter, with PMHx of HTN, HLD, hypothyroidism, atrial fibrillation on Eliquis, recurrent hyponatremia, presenting with chief complaint of vomiting and tremors. Hx from chart review and icu team / left message with daughter.

## 2021-09-17 NOTE — PROGRESS NOTE ADULT - ASSESSMENT
Pt 79 yo F from home w/ pmhx of afib on propanolol and xarelto, HTN, w/ CC of N & V. On ED found with Na 116. Admitted for asymptomatic hypovolemic hyponatremia 2/2 dehydration. Pt was recently in the ICU with similar chief complaint.       Plan:  Neuro:  - Appears to be at baseline mental status, AAOx3.  - Neurological exam unremarkable.    CV:  # HTN  -On amlodipine, hydralazine and propanolol at home  -Resumed home meds w/ parameters  -BP well controlled       #Paroxysmal Atrial Fibrillation  - On propanolol and eliquis at home.  - Rate controlled  - continue home meds    Pulm:  # No acute issues  - Saturating well on RA, no SOB, tachypnea. CXR clear    ID:  # No acute issues  - cxr unremarkable, UA clear    Nephro:  # Hyponatremia   - Decreased serum osm, appears dehydrated- likely hypotonic hypovolemic hyponatremia 2/2 extra renal losses  - Although urine Na increased, patient noted on diuretics.  - Yesterday pt with increased UO. Given desmopressin injection.  - s/p DW5 bolus and D5W 120 cc/hr   - Na has corrected from 116 (admission) to 121 today,   - d/jordan IV fluids  - Started on salt tablets and changed diet to regula  - CMP to be repeated today at 5 pm   - Nephro, Dr. Benoit consulted  - Endo Dr. Lomas consulted, she recommends to repeat TSH and free T4 levels. Also ordered serum cortisol levels for tomorrow at AM  - If cortisol levels<15, pt will need to have stim test    GI:  # Nausea  - asymptomatic at this time    Heme:  #Borderline anemia  - H/H stable  - Iron studies from prior admission noted.  - No indication for transfusion    Endo:  #Hypothyroidism  - Continue home dose levothyroxine  - Noted elevated TSH levels  - We'll repeat TSH levels (given discrepancy between prior admission and latest admission levels), free T4       Prophy:  - resume DOAC< Eliquis  - NO PPI as pt is eating    Dispo:  - Continue in ICU for further monitoring of hyponatremia.

## 2021-09-17 NOTE — PROGRESS NOTE ADULT - SUBJECTIVE AND OBJECTIVE BOX
[facility] NEPHROLOGY- PROGRESS NOTE    80y Estonian speaking  Female with history of HTN, afib presents with vomiting and tremors. Serum Na 116 in ER. Nephrology consulted for hyponatremia.    Hospital Medications: Medications reviewed.  REVIEW OF SYSTEMS: [place]ian  # [id number]  CONSTITUTIONAL: No fevers or chills +dizziness  RESPIRATORY: No shortness of breath  CARDIOVASCULAR: No chest pain.  GASTROINTESTINAL: No nausea, vomiting, or diarrhea. RLQ abdominal pain.  +constipation  VASCULAR: No bilateral lower extremity edema.  +Rt hip pain    VITALS:  T(F): 97.2 (21 @ 12:00), Max: 98.8 (21 @ 19:36)  HR: 59 (21 @ 14:00)  BP: 132/70 (21 @ 13:00)  RR: 18 (21 @ 14:00)  SpO2: 97% (21 @ 14:00)  Wt(kg): --  Height (cm): 154.9 ( @ 05:00), 154.9 ( 22:37)  Weight (kg): 66.7 ( @ 10:26), 68.8 ( 22:37)  BMI (kg/m2): 27.8 ( 10:26), 28.7 ( 05:00), 28.7 ( 22:37)  BSA (m2): 1.66 ( 10:26), 1.68 ( 05:00), 1.68 ( 22:37)     @ 07:01  -   @ 07:00  --------------------------------------------------------  IN: 2340 mL / OUT: 3915 mL / NET: -1575 mL     @ 07:01  -   @ 15:29  --------------------------------------------------------  IN: 300 mL / OUT: 435 mL / NET: -135 mL      PHYSICAL EXAM:  Constitutional: NAD  Neurological: Awake and Alert  HEENT: anicteric sclera,   Respiratory: CTAB, no wheezes, rales or rhonchi  Cardiovascular: S1, S2, RRR  Gastrointestinal: BS+, soft, NT/ND  : No ventura.  Extremities: No peripheral edema    LABS:    121 <--, 121 <--, 119 <--, 123 <--, 123 <--, 124 <--, 120 <--  121<L>  |  88<L>  |  9   ----------------------------<  106<H>  4.1   |  25  |  0.70    Ca    9.0      17 Sep 2021 10:59  Phos  3.4       Mg     1.8         TPro  6.9  /  Alb  3.2<L>  /  TBili  0.5  /  DBili      /  AST  16  /  ALT  23  /  AlkPhos  76      Creatinine Trend: 0.70 <--, 0.60 <--, 0.65 <--, 0.79 <--, 0.66 <--, 0.52 <--, 0.50 <--, 0.58 <--                        11.1   5.77  )-----------( 366      ( 17 Sep 2021 03:52 )             32.4     Urine Studies:  Urinalysis Basic - ( 16 Sep 2021 08:21 )    Color: Yellow / Appearance: Clear / S.010 / pH:   Gluc:  / Ketone: Trace  / Bili: Negative / Urobili: Negative   Blood:  / Protein: Negative / Nitrite: Negative   Leuk Esterase: Negative / RBC:  / WBC    Sq Epi:  / Non Sq Epi:  / Bacteria:       Osmolality, Random Urine: 291 mos/kg ( @ 03:52)  Sodium, Random Urine: 54 mmol/L ( @ 03:52)  Sodium, Random Urine: 37 mmol/L ( @ 14:43)  Osmolality, Random Urine: 365 mos/kg ( @ 14:42)  Osmolality, Random Urine: 213 mos/kg ( @ 08:21)  Sodium, Random Urine: 56 mmol/L ( @ 08:21)  Creatinine, Random Urine: <13 mg/dL ( @ 08:21)    RADIOLOGY & ADDITIONAL STUDIES:

## 2021-09-17 NOTE — CONSULT NOTE ADULT - PROBLEM SELECTOR RECOMMENDATION 9
hypotonic hypovolemic hyponatremia   likely due to nausea/ vomiting   tx per nephro  r/o AI- serum cortisol 5.5 last admission ? random level  check am cortisol  consider stim testing

## 2021-09-17 NOTE — PROGRESS NOTE ADULT - SUBJECTIVE AND OBJECTIVE BOX
INTERVAL HPI/OVERNIGHT EVENTS: ***    PRESSORS: [ ] YES [ ] NO  WHICH:    Antimicrobial:    Cardiovascular:  amLODIPine   Tablet 5 milliGRAM(s) Oral daily  hydrALAZINE 25 milliGRAM(s) Oral two times a day  propranolol 10 milliGRAM(s) Oral two times a day    Pulmonary:    Hematalogic:  apixaban 5 milliGRAM(s) Oral every 12 hours    Other:  chlorhexidine 2% Cloths 1 Application(s) Topical <User Schedule>  latanoprost 0.005% Ophthalmic Solution 1 Drop(s) Both EYES at bedtime  levothyroxine 50 MICROGram(s) Oral daily  ondansetron Injectable 4 milliGRAM(s) IV Push every 8 hours PRN  polyethylene glycol 3350 17 Gram(s) Oral two times a day  senna 2 Tablet(s) Oral at bedtime  simvastatin 10 milliGRAM(s) Oral at bedtime  timolol 0.5% Solution 1 Drop(s) Both EYES two times a day    amLODIPine   Tablet 5 milliGRAM(s) Oral daily  apixaban 5 milliGRAM(s) Oral every 12 hours  chlorhexidine 2% Cloths 1 Application(s) Topical <User Schedule>  hydrALAZINE 25 milliGRAM(s) Oral two times a day  latanoprost 0.005% Ophthalmic Solution 1 Drop(s) Both EYES at bedtime  levothyroxine 50 MICROGram(s) Oral daily  ondansetron Injectable 4 milliGRAM(s) IV Push every 8 hours PRN  polyethylene glycol 3350 17 Gram(s) Oral two times a day  propranolol 10 milliGRAM(s) Oral two times a day  senna 2 Tablet(s) Oral at bedtime  simvastatin 10 milliGRAM(s) Oral at bedtime  timolol 0.5% Solution 1 Drop(s) Both EYES two times a day    Drug Dosing Weight  Height (cm): 154.9 (16 Sep 2021 05:00)  Weight (kg): 66.7 (16 Sep 2021 10:26)  BMI (kg/m2): 27.8 (16 Sep 2021 10:26)  BSA (m2): 1.66 (16 Sep 2021 10:26)    CENTRAL LINE: [ ] YES [ ] NO  LOCATION:   DATE INSERTED:  REMOVE: [ ] YES [ ] NO  EXPLAIN:    ARROYO: [ ] YES [ ] NO    DATE INSERTED:  REMOVE:  [ ] YES [ ] NO  EXPLAIN:    A-LINE:  [ ] YES [ ] NO  LOCATION:   DATE INSERTED:  REMOVE:  [ ] YES [ ] NO  EXPLAIN:    PMH -reviewed admission note, no change since admission  PAST MEDICAL & SURGICAL HISTORY:  HTN (hypertension)    Atrial fibrillation    Hypothyroidism, adult    No significant past surgical history        ICU Vital Signs Last 24 Hrs  T(C): 36.6 (17 Sep 2021 04:13), Max: 37.1 (16 Sep 2021 19:36)  T(F): 97.9 (17 Sep 2021 04:13), Max: 98.8 (16 Sep 2021 19:36)  HR: 55 (17 Sep 2021 06:00) (55 - 66)  BP: 145/77 (17 Sep 2021 06:00) (111/51 - 159/69)  BP(mean): 97 (17 Sep 2021 06:00) (66 - 105)  ABP: --  ABP(mean): --  RR: 17 (17 Sep 2021 06:00) (13 - 24)  SpO2: 94% (17 Sep 2021 06:00) (94% - 99%)            -16 @ 07:01  -  - @ 07:00  --------------------------------------------------------  IN: 2340 mL / OUT: 3915 mL / NET: -1575 mL            PHYSICAL EXAM:    GENERAL: NAD, well-groomed, well-developed  HEAD:  Atraumatic, Normocephalic  EYES: EOMI, PERRLA, conjunctiva and sclera clear  ENMT: No tonsillar erythema, exudates, or enlargement; Moist mucous membranes, Good dentition, No lesions  NECK: Supple, normal appearance, No JVD; Normal thyroid; Trachea midline  NERVOUS SYSTEM:  Alert & Oriented X3,  Motor Strength 5/5 B/L upper and lower extremities; DTRs 2+ intact and symmetric  CHEST/LUNG: No chest deformity; Normal percussion bilaterally; No rales, rhonchi, wheezing   HEART: Regular rate and rhythm; No murmurs, rubs, or gallops  ABDOMEN: Soft, Nontender, Nondistended; Bowel sounds present  EXTREMITIES:  2+ Peripheral Pulses, No clubbing, cyanosis, or edema  LYMPH: No lymphadenopathy noted  SKIN: No rashes or lesions;  Good capillary refill      LABS:  CBC Full  -  ( 17 Sep 2021 03:52 )  WBC Count : 5.77 K/uL  RBC Count : 3.80 M/uL  Hemoglobin : 11.1 g/dL  Hematocrit : 32.4 %  Platelet Count - Automated : 366 K/uL  Mean Cell Volume : 85.3 fl  Mean Cell Hemoglobin : 29.2 pg  Mean Cell Hemoglobin Concentration : 34.3 gm/dL  Auto Neutrophil # : 3.90 K/uL  Auto Lymphocyte # : 0.97 K/uL  Auto Monocyte # : 0.69 K/uL  Auto Eosinophil # : 0.15 K/uL  Auto Basophil # : 0.02 K/uL  Auto Neutrophil % : 67.6 %  Auto Lymphocyte % : 16.8 %  Auto Monocyte % : 12.0 %  Auto Eosinophil % : 2.6 %  Auto Basophil % : 0.3 %        121<L>  |  88<L>  |  7   ----------------------------<  91  4.0   |  25  |  0.60    Ca    8.8      17 Sep 2021 03:52  Phos  2.4       Mg     1.8         TPro  6.9  /  Alb  3.2<L>  /  TBili  0.5  /  DBili  x   /  AST  16  /  ALT  23  /  AlkPhos  76        Urinalysis Basic - ( 16 Sep 2021 08:21 )    Color: Yellow / Appearance: Clear / S.010 / pH: x  Gluc: x / Ketone: Trace  / Bili: Negative / Urobili: Negative   Blood: x / Protein: Negative / Nitrite: Negative   Leuk Esterase: Negative / RBC: x / WBC x   Sq Epi: x / Non Sq Epi: x / Bacteria: x          RADIOLOGY & ADDITIONAL STUDIES REVIEWED:  ***    [ ]GOALS OF CARE DISCUSSION WITH PATIENT/FAMILY/PROXY:    CRITICAL CARE TIME SPENT: 35 minutes INTERVAL HPI/OVERNIGHT EVENTS: no overnight issues. Pt Croatian speaker, conversation translated by pacific  (Lay ID # [id number]). Pt reports feeling well, denies Nicholas, CP, SOB or any other acute complaint, No BM.     PRESSORS: [ ] YES [x ] NO  WHICH:    Antimicrobial:    Cardiovascular:  amLODIPine   Tablet 5 milliGRAM(s) Oral daily  hydrALAZINE 25 milliGRAM(s) Oral two times a day  propranolol 10 milliGRAM(s) Oral two times a day    Pulmonary:    Hematalogic:  apixaban 5 milliGRAM(s) Oral every 12 hours    Other:  chlorhexidine 2% Cloths 1 Application(s) Topical <User Schedule>  latanoprost 0.005% Ophthalmic Solution 1 Drop(s) Both EYES at bedtime  levothyroxine 50 MICROGram(s) Oral daily  ondansetron Injectable 4 milliGRAM(s) IV Push every 8 hours PRN  polyethylene glycol 3350 17 Gram(s) Oral two times a day  senna 2 Tablet(s) Oral at bedtime  simvastatin 10 milliGRAM(s) Oral at bedtime  timolol 0.5% Solution 1 Drop(s) Both EYES two times a day    amLODIPine   Tablet 5 milliGRAM(s) Oral daily  apixaban 5 milliGRAM(s) Oral every 12 hours  chlorhexidine 2% Cloths 1 Application(s) Topical <User Schedule>  hydrALAZINE 25 milliGRAM(s) Oral two times a day  latanoprost 0.005% Ophthalmic Solution 1 Drop(s) Both EYES at bedtime  levothyroxine 50 MICROGram(s) Oral daily  ondansetron Injectable 4 milliGRAM(s) IV Push every 8 hours PRN  polyethylene glycol 3350 17 Gram(s) Oral two times a day  propranolol 10 milliGRAM(s) Oral two times a day  senna 2 Tablet(s) Oral at bedtime  simvastatin 10 milliGRAM(s) Oral at bedtime  timolol 0.5% Solution 1 Drop(s) Both EYES two times a day    Drug Dosing Weight  Height (cm): 154.9 (16 Sep 2021 05:00)  Weight (kg): 66.7 (16 Sep 2021 10:26)  BMI (kg/m2): 27.8 (16 Sep 2021 10:26)  BSA (m2): 1.66 (16 Sep 2021 10:26)    CENTRAL LINE: [ ] YES [x ] NO  LOCATION:   DATE INSERTED:  REMOVE: [ ] YES [ ] NO  EXPLAIN:    ARROYO: [x ] YES [ ] NO    DATE INSERTED: 21  REMOVE:  [ ] YES [ ] NO  EXPLAIN:    A-LINE:  [ ] YES [x] NO  LOCATION:   DATE INSERTED:  REMOVE:  [ ] YES [ ] NO  EXPLAIN:    PMH -reviewed admission note, no change since admission  PAST MEDICAL & SURGICAL HISTORY:  HTN (hypertension)    Atrial fibrillation    Hypothyroidism, adult    No significant past surgical history        ICU Vital Signs Last 24 Hrs  T(C): 36.6 (17 Sep 2021 04:13), Max: 37.1 (16 Sep 2021 19:36)  T(F): 97.9 (17 Sep 2021 04:13), Max: 98.8 (16 Sep 2021 19:36)  HR: 55 (17 Sep 2021 06:00) (55 - 66)  BP: 145/77 (17 Sep 2021 06:00) (111/51 - 159/69)  BP(mean): 97 (17 Sep 2021 06:00) (66 - 105)  ABP: --  ABP(mean): --  RR: 17 (17 Sep 2021 06:00) (13 - 24)  SpO2: 94% (17 Sep 2021 06:00) (94% - 99%)            16 @ 07:01  -   @ 07:00  --------------------------------------------------------  IN: 2340 mL / OUT: 3915 mL / NET: -1575 mL            PHYSICAL EXAM:    GENERAL: NAD,   HEAD:  Atraumatic, Normocephalic  EYES: EOMI, PERRLA, conjunctiva and sclera clear  ENMT: No tonsillar erythema, exudates, or enlargement; Moist mucous membranes  NECK: Supple, normal appearance, No JVD;  Trachea midline  NERVOUS SYSTEM:  Alert & Oriented X3,  Motor Strength 5/5 B/L upper and lower extremities  CHEST/LUNG: No chest deformity; Normal percussion bilaterally; No rales, rhonchi, wheezing   HEART: Regular rate and rhythm; No murmurs, rubs, or gallops  ABDOMEN: Soft, Nontender, Nondistended; Bowel sounds present  EXTREMITIES:  2+ Peripheral Pulses, No clubbing, cyanosis, or edema  SKIN: No rashes or lesions;  Good capillary refill      LABS:  CBC Full  -  ( 17 Sep 2021 03:52 )  WBC Count : 5.77 K/uL  RBC Count : 3.80 M/uL  Hemoglobin : 11.1 g/dL  Hematocrit : 32.4 %  Platelet Count - Automated : 366 K/uL  Mean Cell Volume : 85.3 fl  Mean Cell Hemoglobin : 29.2 pg  Mean Cell Hemoglobin Concentration : 34.3 gm/dL  Auto Neutrophil # : 3.90 K/uL  Auto Lymphocyte # : 0.97 K/uL  Auto Monocyte # : 0.69 K/uL  Auto Eosinophil # : 0.15 K/uL  Auto Basophil # : 0.02 K/uL  Auto Neutrophil % : 67.6 %  Auto Lymphocyte % : 16.8 %  Auto Monocyte % : 12.0 %  Auto Eosinophil % : 2.6 %  Auto Basophil % : 0.3 %        121<L>  |  88<L>  |  7   ----------------------------<  91  4.0   |  25  |  0.60    Ca    8.8      17 Sep 2021 03:52  Phos  2.4       Mg     1.8         TPro  6.9  /  Alb  3.2<L>  /  TBili  0.5  /  DBili  x   /  AST  16  /  ALT  23  /  AlkPhos  76        Urinalysis Basic - ( 16 Sep 2021 08:21 )    Color: Yellow / Appearance: Clear / S.010 / pH: x  Gluc: x / Ketone: Trace  / Bili: Negative / Urobili: Negative   Blood: x / Protein: Negative / Nitrite: Negative   Leuk Esterase: Negative / RBC: x / WBC x   Sq Epi: x / Non Sq Epi: x / Bacteria: x          CRITICAL CARE TIME SPENT: 35 minutes

## 2021-09-18 LAB
ALBUMIN SERPL ELPH-MCNC: 3.1 G/DL — LOW (ref 3.5–5)
ALP SERPL-CCNC: 72 U/L — SIGNIFICANT CHANGE UP (ref 40–120)
ALT FLD-CCNC: 20 U/L DA — SIGNIFICANT CHANGE UP (ref 10–60)
ANION GAP SERPL CALC-SCNC: 7 MMOL/L — SIGNIFICANT CHANGE UP (ref 5–17)
ANION GAP SERPL CALC-SCNC: 8 MMOL/L — SIGNIFICANT CHANGE UP (ref 5–17)
ANION GAP SERPL CALC-SCNC: 9 MMOL/L — SIGNIFICANT CHANGE UP (ref 5–17)
ANION GAP SERPL CALC-SCNC: 9 MMOL/L — SIGNIFICANT CHANGE UP (ref 5–17)
AST SERPL-CCNC: 13 U/L — SIGNIFICANT CHANGE UP (ref 10–40)
BILIRUB SERPL-MCNC: 0.5 MG/DL — SIGNIFICANT CHANGE UP (ref 0.2–1.2)
BUN SERPL-MCNC: 12 MG/DL — SIGNIFICANT CHANGE UP (ref 7–18)
BUN SERPL-MCNC: 12 MG/DL — SIGNIFICANT CHANGE UP (ref 7–18)
BUN SERPL-MCNC: 13 MG/DL — SIGNIFICANT CHANGE UP (ref 7–18)
BUN SERPL-MCNC: 19 MG/DL — HIGH (ref 7–18)
CALCIUM SERPL-MCNC: 8.8 MG/DL — SIGNIFICANT CHANGE UP (ref 8.4–10.5)
CALCIUM SERPL-MCNC: 9.1 MG/DL — SIGNIFICANT CHANGE UP (ref 8.4–10.5)
CALCIUM SERPL-MCNC: 9.2 MG/DL — SIGNIFICANT CHANGE UP (ref 8.4–10.5)
CALCIUM SERPL-MCNC: 9.2 MG/DL — SIGNIFICANT CHANGE UP (ref 8.4–10.5)
CHLORIDE SERPL-SCNC: 92 MMOL/L — LOW (ref 96–108)
CHLORIDE SERPL-SCNC: 95 MMOL/L — LOW (ref 96–108)
CHLORIDE SERPL-SCNC: 96 MMOL/L — SIGNIFICANT CHANGE UP (ref 96–108)
CHLORIDE SERPL-SCNC: 99 MMOL/L — SIGNIFICANT CHANGE UP (ref 96–108)
CHLORIDE UR-SCNC: 29 MMOL/L — SIGNIFICANT CHANGE UP
CO2 SERPL-SCNC: 22 MMOL/L — SIGNIFICANT CHANGE UP (ref 22–31)
CO2 SERPL-SCNC: 24 MMOL/L — SIGNIFICANT CHANGE UP (ref 22–31)
CO2 SERPL-SCNC: 27 MMOL/L — SIGNIFICANT CHANGE UP (ref 22–31)
CO2 SERPL-SCNC: 27 MMOL/L — SIGNIFICANT CHANGE UP (ref 22–31)
CORTIS AM PEAK SERPL-MCNC: 17.4 UG/DL — SIGNIFICANT CHANGE UP (ref 6–18.4)
CREAT SERPL-MCNC: 0.65 MG/DL — SIGNIFICANT CHANGE UP (ref 0.5–1.3)
CREAT SERPL-MCNC: 0.66 MG/DL — SIGNIFICANT CHANGE UP (ref 0.5–1.3)
CREAT SERPL-MCNC: 0.77 MG/DL — SIGNIFICANT CHANGE UP (ref 0.5–1.3)
CREAT SERPL-MCNC: 0.99 MG/DL — SIGNIFICANT CHANGE UP (ref 0.5–1.3)
GLUCOSE SERPL-MCNC: 101 MG/DL — HIGH (ref 70–99)
GLUCOSE SERPL-MCNC: 136 MG/DL — HIGH (ref 70–99)
GLUCOSE SERPL-MCNC: 95 MG/DL — SIGNIFICANT CHANGE UP (ref 70–99)
GLUCOSE SERPL-MCNC: 99 MG/DL — SIGNIFICANT CHANGE UP (ref 70–99)
HCT VFR BLD CALC: 33.9 % — LOW (ref 34.5–45)
HGB BLD-MCNC: 11.7 G/DL — SIGNIFICANT CHANGE UP (ref 11.5–15.5)
MAGNESIUM SERPL-MCNC: 2.1 MG/DL — SIGNIFICANT CHANGE UP (ref 1.6–2.6)
MAGNESIUM SERPL-MCNC: 2.2 MG/DL — SIGNIFICANT CHANGE UP (ref 1.6–2.6)
MCHC RBC-ENTMCNC: 29.9 PG — SIGNIFICANT CHANGE UP (ref 27–34)
MCHC RBC-ENTMCNC: 34.5 GM/DL — SIGNIFICANT CHANGE UP (ref 32–36)
MCV RBC AUTO: 86.7 FL — SIGNIFICANT CHANGE UP (ref 80–100)
NRBC # BLD: 0 /100 WBCS — SIGNIFICANT CHANGE UP (ref 0–0)
OSMOLALITY SERPL: 269 MOSMOL/KG — LOW (ref 280–301)
OSMOLALITY UR: 175 MOS/KG — SIGNIFICANT CHANGE UP (ref 50–1200)
PHOSPHATE SERPL-MCNC: 3.2 MG/DL — SIGNIFICANT CHANGE UP (ref 2.5–4.5)
PHOSPHATE SERPL-MCNC: 3.3 MG/DL — SIGNIFICANT CHANGE UP (ref 2.5–4.5)
PLATELET # BLD AUTO: 366 K/UL — SIGNIFICANT CHANGE UP (ref 150–400)
POTASSIUM SERPL-MCNC: 3.7 MMOL/L — SIGNIFICANT CHANGE UP (ref 3.5–5.3)
POTASSIUM SERPL-MCNC: 4.4 MMOL/L — SIGNIFICANT CHANGE UP (ref 3.5–5.3)
POTASSIUM SERPL-MCNC: 4.5 MMOL/L — SIGNIFICANT CHANGE UP (ref 3.5–5.3)
POTASSIUM SERPL-MCNC: 4.5 MMOL/L — SIGNIFICANT CHANGE UP (ref 3.5–5.3)
POTASSIUM SERPL-SCNC: 3.7 MMOL/L — SIGNIFICANT CHANGE UP (ref 3.5–5.3)
POTASSIUM SERPL-SCNC: 4.4 MMOL/L — SIGNIFICANT CHANGE UP (ref 3.5–5.3)
POTASSIUM SERPL-SCNC: 4.5 MMOL/L — SIGNIFICANT CHANGE UP (ref 3.5–5.3)
POTASSIUM SERPL-SCNC: 4.5 MMOL/L — SIGNIFICANT CHANGE UP (ref 3.5–5.3)
PROT SERPL-MCNC: 6.6 G/DL — SIGNIFICANT CHANGE UP (ref 6–8.3)
RBC # BLD: 3.91 M/UL — SIGNIFICANT CHANGE UP (ref 3.8–5.2)
RBC # FLD: 13.6 % — SIGNIFICANT CHANGE UP (ref 10.3–14.5)
SODIUM SERPL-SCNC: 125 MMOL/L — LOW (ref 135–145)
SODIUM SERPL-SCNC: 129 MMOL/L — LOW (ref 135–145)
SODIUM SERPL-SCNC: 130 MMOL/L — LOW (ref 135–145)
SODIUM SERPL-SCNC: 131 MMOL/L — LOW (ref 135–145)
SODIUM UR-SCNC: 24 MMOL/L — SIGNIFICANT CHANGE UP
T4 FREE SERPL-MCNC: 1.5 NG/DL — SIGNIFICANT CHANGE UP (ref 0.9–1.8)
TSH SERPL-MCNC: 6.88 UU/ML — HIGH (ref 0.34–4.82)
WBC # BLD: 5.5 K/UL — SIGNIFICANT CHANGE UP (ref 3.8–10.5)
WBC # FLD AUTO: 5.5 K/UL — SIGNIFICANT CHANGE UP (ref 3.8–10.5)

## 2021-09-18 RX ORDER — SODIUM CHLORIDE 9 MG/ML
1 INJECTION INTRAMUSCULAR; INTRAVENOUS; SUBCUTANEOUS
Refills: 0 | Status: DISCONTINUED | OUTPATIENT
Start: 2021-09-18 | End: 2021-09-18

## 2021-09-18 RX ADMIN — POLYETHYLENE GLYCOL 3350 17 GRAM(S): 17 POWDER, FOR SOLUTION ORAL at 17:24

## 2021-09-18 RX ADMIN — Medication 50 MICROGRAM(S): at 05:04

## 2021-09-18 RX ADMIN — APIXABAN 5 MILLIGRAM(S): 2.5 TABLET, FILM COATED ORAL at 17:24

## 2021-09-18 RX ADMIN — CHLORHEXIDINE GLUCONATE 1 APPLICATION(S): 213 SOLUTION TOPICAL at 05:05

## 2021-09-18 RX ADMIN — Medication 1 DROP(S): at 05:05

## 2021-09-18 RX ADMIN — Medication 1 DROP(S): at 17:33

## 2021-09-18 RX ADMIN — POLYETHYLENE GLYCOL 3350 17 GRAM(S): 17 POWDER, FOR SOLUTION ORAL at 05:04

## 2021-09-18 RX ADMIN — AMLODIPINE BESYLATE 5 MILLIGRAM(S): 2.5 TABLET ORAL at 05:04

## 2021-09-18 RX ADMIN — APIXABAN 5 MILLIGRAM(S): 2.5 TABLET, FILM COATED ORAL at 05:04

## 2021-09-18 RX ADMIN — SENNA PLUS 2 TABLET(S): 8.6 TABLET ORAL at 21:19

## 2021-09-18 RX ADMIN — SODIUM CHLORIDE 1 GRAM(S): 9 INJECTION INTRAMUSCULAR; INTRAVENOUS; SUBCUTANEOUS at 05:04

## 2021-09-18 RX ADMIN — LATANOPROST 1 DROP(S): 0.05 SOLUTION/ DROPS OPHTHALMIC; TOPICAL at 21:19

## 2021-09-18 RX ADMIN — SIMVASTATIN 10 MILLIGRAM(S): 20 TABLET, FILM COATED ORAL at 21:19

## 2021-09-18 RX ADMIN — Medication 25 MILLIGRAM(S): at 05:04

## 2021-09-18 NOTE — PROGRESS NOTE ADULT - ASSESSMENT
80y Female with history of HTN, afib presents with vomiting and tremors. Nephrology consulted for hyponatremia.    1) Hyponatremia: Hypotonic hyponatremia. Repeat urine studies consistent with increased ADH state from nausea and vomiting. Pt given 1L NS in ER with overcorrection s/p DDAVP and D5 IVF to lower sodium appropriately. Repeat serum Na stable at 121 off IVF. Pt then started on NaCl 1g PO bid.  Sodium nathaly ~10mEq/12h, a bit faster than desired, but low likelihood of CPM in this context.  Will give NaCl tab 1g this am, but then stop NaCl tabs.  Repeat na+ Q8h.  If Na+>135, then start gentle D5W IVF hydration. Monitor urine o/p.   Elevated TSH- Endocrine following. Check AM cortisol. Repeat urine sodium, urine osmolality and serum osmolality again tomorrow am.  Today urine studies look a little more like poor solute intake, though not clear given pt has been on so many medications to modify serum/urine chemistry. Monitor serum sodium.    2) HTN: BP controlled. Continue with current anti-hypertensive medications. Monitor BP.    3) Nausea/ vomiting: resolved. c/w Zofran prn.     4) Hypothyroidism: Pt on Synthroid 50mcg PO qd. Endocrine following.       [facility] NEPHROLOG[facility]  Manuel Clay M.D.  Mark Slaughter D.O.  Marj Benoit M.D.  Shamika Lopez, MSN, ANP-C    Telephone: [phone]  Facsimile: [phone]    [address] [place] [address]

## 2021-09-18 NOTE — PROGRESS NOTE ADULT - ATTENDING COMMENTS
Assessment:    -Hyponatremia symptomatic   - Hypertension  - Hypothyroidism  - Paroxysmal Atrial Fibrillation  -        Plan   -probable hypovolemic hypotonic hyponatremia due to dehydration   -goal of Na correction 8 mEq/ 24 hr   -Na improving   -No ivf  -change to unrestricted Na diet   -avoid over correction   -BMP q4h  -hemodynamic monitoring   -Neph eval note   -Endo eval .. this is second episode of hyponatremia in 4 weeks   -fall precaution   -dvt/gi prophy  -diet.
Assessment:    -Hyponatremia symptomatic   - Hypertension  - Hypothyroidism  - Paroxysmal Atrial Fibrillation  -        Plan   -probable hypovolemic hypotonic hyponatremia due to dehydration   -goal of Na correction 8 mEq/ 24 hr   -Na improving   -No ivf  -change to unrestricted Na diet   -avoid over correction   -BMP q4h  -hemodynamic monitoring   -Neph following   -Endo eval .. this is second episode of hyponatremia in 4 weeks   -fall precaution   -dvt/gi prophy  -diet.

## 2021-09-18 NOTE — PROGRESS NOTE ADULT - SUBJECTIVE AND OBJECTIVE BOX
· Reason for Admission	Hyponatremia      · Subjective and Objective:   INTERVAL HPI/OVERNIGHT EVENTS: patient's serum sodium was closely monitored overnight, started on Na tabs, no altered mental status, no overnight events     PRESSORS: [ ] YES [ x] NO      Antimicrobial:    Cardiovascular:  amLODIPine   Tablet 5 milliGRAM(s) Oral daily  hydrALAZINE 25 milliGRAM(s) Oral two times a day  propranolol 10 milliGRAM(s) Oral two times a day    Pulmonary:    Hematalogic:  apixaban 5 milliGRAM(s) Oral every 12 hours    Other:  chlorhexidine 2% Cloths 1 Application(s) Topical <User Schedule>  latanoprost 0.005% Ophthalmic Solution 1 Drop(s) Both EYES at bedtime  levothyroxine 50 MICROGram(s) Oral daily  ondansetron Injectable 4 milliGRAM(s) IV Push every 8 hours PRN  polyethylene glycol 3350 17 Gram(s) Oral two times a day  senna 2 Tablet(s) Oral at bedtime  simvastatin 10 milliGRAM(s) Oral at bedtime  sodium chloride 1 Gram(s) Oral two times a day  timolol 0.5% Solution 1 Drop(s) Both EYES two times a day    amLODIPine   Tablet 5 milliGRAM(s) Oral daily  apixaban 5 milliGRAM(s) Oral every 12 hours  chlorhexidine 2% Cloths 1 Application(s) Topical <User Schedule>  hydrALAZINE 25 milliGRAM(s) Oral two times a day  latanoprost 0.005% Ophthalmic Solution 1 Drop(s) Both EYES at bedtime  levothyroxine 50 MICROGram(s) Oral daily  ondansetron Injectable 4 milliGRAM(s) IV Push every 8 hours PRN  polyethylene glycol 3350 17 Gram(s) Oral two times a day  propranolol 10 milliGRAM(s) Oral two times a day  senna 2 Tablet(s) Oral at bedtime  simvastatin 10 milliGRAM(s) Oral at bedtime  sodium chloride 1 Gram(s) Oral two times a day  timolol 0.5% Solution 1 Drop(s) Both EYES two times a day    Drug Dosing Weight  Height (cm): 154.9 (16 Sep 2021 05:00)  Weight (kg): 66.7 (16 Sep 2021 10:26)  BMI (kg/m2): 27.8 (16 Sep 2021 10:26)  BSA (m2): 1.66 (16 Sep 2021 10:26)    CENTRAL LINE: [ ] YES [ ] NO  LOCATION:         ARROYO: [ ] YES [ ] NO          A-LINE:  [ ] YES [ ] NO  LOCATION:             ICU Vital Signs Last 24 Hrs  T(C): 36.9 (18 Sep 2021 00:06), Max: 36.9 (18 Sep 2021 00:06)  T(F): 98.4 (18 Sep 2021 00:06), Max: 98.4 (18 Sep 2021 00:06)  HR: 60 (18 Sep 2021 00:00) (51 - 65)  BP: 129/65 (18 Sep 2021 00:00) (95/49 - 156/66)  BP(mean): 86 (18 Sep 2021 00:00) (63 - 99)  ABP: --  ABP(mean): --  RR: 16 (18 Sep 2021 00:00) (12 - 24)  SpO2: 98% (18 Sep 2021 00:00) (94% - 100%)            -16 @ 07:01  -  - @ 07:00  --------------------------------------------------------  IN: 2340 mL / OUT: 3915 mL / NET: -1575 mL              PHYSICAL EXAM:    GENERAL: NAD  EYES: EOMI, PERRLA  NECK: Supple, No JVD; Normal thyroid; Trachea midline: No LAD   NERVOUS SYSTEM:  Alert & Oriented X3,  Motor Strength 5/5 B/L upper and lower extremities; DTRs 2+ intact and symmetric  CHEST/LUNG: No rales, rhonchi, wheezing, breath sounds present bilaterally  HEART: Regular rate and rhythm; No murmurs, no gallops  ABDOMEN: Soft, Nontender, Nondistended; Bowel sounds present, no pain or masses on palpation  : Arroyo in place  EXTREMITIES:  2+ Peripheral Pulses, No clubbing, cyanosis, or edema  SKIN: warm, intact, no lesions         LABS:  CBC Full  -  ( 17 Sep 2021 03:52 )  WBC Count : 5.77 K/uL  RBC Count : 3.80 M/uL  Hemoglobin : 11.1 g/dL  Hematocrit : 32.4 %  Platelet Count - Automated : 366 K/uL  Mean Cell Volume : 85.3 fl  Mean Cell Hemoglobin : 29.2 pg  Mean Cell Hemoglobin Concentration : 34.3 gm/dL  Auto Neutrophil # : 3.90 K/uL  Auto Lymphocyte # : 0.97 K/uL  Auto Monocyte # : 0.69 K/uL  Auto Eosinophil # : 0.15 K/uL  Auto Basophil # : 0.02 K/uL  Auto Neutrophil % : 67.6 %  Auto Lymphocyte % : 16.8 %  Auto Monocyte % : 12.0 %  Auto Eosinophil % : 2.6 %  Auto Basophil % : 0.3 %        125<L>  |  92<L>  |  13  ----------------------------<  101<H>  4.5   |  24  |  0.65    Ca    9.1      17 Sep 2021 23:43  Phos  3.3       Mg     2.1         TPro  6.9  /  Alb  3.2<L>  /  TBili  0.5  /  DBili  x   /  AST  16  /  ALT  23  /  AlkPhos  76        Urinalysis Basic - ( 16 Sep 2021 08:21 )    Color: Yellow / Appearance: Clear / S.010 / pH: x  Gluc: x / Ketone: Trace  / Bili: Negative / Urobili: Negative   Blood: x / Protein: Negative / Nitrite: Negative   Leuk Esterase: Negative / RBC: x / WBC x   Sq Epi: x / Non Sq Epi: x / Bacteria: x      Culture Results:   No growth ( @ 13:25)  Culture Results:   No growth to date. ( @ 10:32)  Culture Results:   No growth to date. ( @ 10:32)      RADIOLOGY & ADDITIONAL STUDIES REVIEWED:  ***    [ ]GOALS OF CARE DISCUSSION WITH PATIENT/FAMILY/PROXY:    CRITICAL CARE TIME SPENT: 35 minutes      Assessment and Plan:   · Assessment	  Pt 79 yo F from home w/ pmhx of afib on propanolol and xarelto, HTN, w/ CC of N & V. On ED found with Na 116. Admitted for asymptomatic hypovolemic hyponatremia 2/2 dehydration. Pt was recently in the ICU with similar chief complaint.       Plan:  Neuro:  - Appears to be at baseline mental status, AAOx3.  - Neurological exam unremarkable.    CV:  # HTN  -On amlodipine, hydralazine and propanolol at home  -Resumed home meds w/ parameters  -BP well controlled       #Paroxysmal Atrial Fibrillation  - On propanolol and eliquis at home.  - Rate controlled  - continue home meds    Pulm:  # No acute issues  - Saturating well on RA, no SOB, tachypnea. CXR clear    ID:  # No acute issues  - cxr unremarkable, UA clear    Nephro:  # Hyponatremia   - Decreased serum osm, appears dehydrated- likely hypotonic hypovolemic hyponatremia 2/2 extra renal losses  - Although urine Na increased, patient noted on diuretics.  - Yesterday pt with increased UO. Given desmopressin injection.  - s/p DW5 bolus and D5W 120 cc/hr   - Na has corrected from 116 (admission) to 121 today,   - d/jordan IV fluids  - Started on salt tablets and changed diet to regula  - CMP to be repeated today at 5 pm   - Nephro, Dr. Benoit consulted  - Endo Dr. Lomas consulted, she recommends to repeat TSH and free T4 levels. Also ordered serum cortisol levels for tomorrow at AM  - If cortisol levels<15, pt will need to have stim test    GI:  # Nausea  - asymptomatic at this time    Heme:  #Borderline anemia  - H/H stable  - Iron studies from prior admission noted.  - No indication for transfusion    Endo:  #Hypothyroidism  - Continue home dose levothyroxine  - Noted elevated TSH levels  - We'll repeat TSH levels (given discrepancy between prior admission and latest admission levels), free T4       Prophy:  - resume DOAC< Eliquis  - NO PPI as pt is eating    Dispo:  - Continue in ICU for further monitoring of hyponatremia.

## 2021-09-18 NOTE — PROGRESS NOTE ADULT - SUBJECTIVE AND OBJECTIVE BOX
Interval Events:  pt in nad    Allergies    No Known Allergies    Intolerances      Endocrine/Metabolic Medications:  levothyroxine 50 MICROGram(s) Oral daily  simvastatin 10 milliGRAM(s) Oral at bedtime      Vital Signs Last 24 Hrs  T(C): 36.4 (18 Sep 2021 04:01), Max: 36.9 (18 Sep 2021 00:06)  T(F): 97.5 (18 Sep 2021 04:01), Max: 98.4 (18 Sep 2021 00:06)  HR: 59 (18 Sep 2021 10:00) (51 - 65)  BP: 85/46 (18 Sep 2021 10:00) (83/46 - 132/70)  BP(mean): 60 (18 Sep 2021 10:00) (59 - 99)  RR: 15 (18 Sep 2021 10:00) (12 - 24)  SpO2: 96% (18 Sep 2021 10:00) (95% - 100%)      PHYSICAL EXAM  All physical exam findings normal, except those marked:  General:	Alert, active, cooperative, NAD, well hydrated  .		[] Abnormal:  Neck		Normal: supple, no cervical adenopathy, no palpable thyroid  .		[] Abnormal:  Cardiovascular	Normal: regular rate, normal S1, S2, no murmurs  .		[] Abnormal:  Respiratory	Normal: no chest wall deformity, normal respiratory pattern, CTA B/L  .		[] Abnormal:  Abdominal	Normal: soft, ND, NT, bowel sounds present, no masses, no organomegaly  .		[] Abnormal:  		Normal normal genitalia, testes descended, circumcised/uncircumcised  .		Zachary stage:			Breast zachary:  .		Menstrual history:  .		[] Abnormal:  Extremities	Normal: FROM x4  .		[] Abnormal:  Skin		Normal: intact and not indurated, no rash, no acanthosis nigricans  .		[] Abnormal:  Neurologic	Normal: grossly intact  .		[] Abnormal:    LABS                        11.7   5.50  )-----------( 366      ( 18 Sep 2021 04:36 )             33.9                               129    |  95     |  12                  Calcium: 9.2   / iCa: x      (09-18 @ 04:36)    ----------------------------<  95        Magnesium: 2.2                              4.4     |  27     |  0.66             Phosphorous: 3.2      TPro  6.6    /  Alb  3.1    /  TBili  0.5    /  DBili  x      /  AST  13     /  ALT  20     /  AlkPhos  72     18 Sep 2021 04:36    CAPILLARY BLOOD GLUCOSE            Assesment/plan    Patient is an 80F from home, lives with daughter, with PMHx of HTN, HLD, hypothyroidism, atrial fibrillation on Eliquis, recurrent hyponatremia, presenting with chief complaint of vomiting and tremors. Hx from chart review and icu team / left message with daughter.        Problem/Recommendation - 1:  ·  Problem: Hyponatremia.   ·  Recommendation: hypotonic hyponatremia - improving  likely due to nausea/ vomiting   tx per nephro  r/o AI- serum cortisol 5.5 last admission ? random level  check am cortisol- pending  consider stim testing.     Problem/Recommendation - 2:  ·  Problem: Hypothyroidism.   ·  Recommendation: cont lt4 50 mcg  tsh mildly elevated   check free t4 level  d/w icu team.

## 2021-09-18 NOTE — PROGRESS NOTE ADULT - SUBJECTIVE AND OBJECTIVE BOX
INTERVAL HPI/OVERNIGHT EVENTS: patient's serum sodium was closely monitored overnight, started on Na tabs, no altered mental status, no overnight events     PRESSORS: [ ] YES [ x] NO      Antimicrobial:    Cardiovascular:  amLODIPine   Tablet 5 milliGRAM(s) Oral daily  hydrALAZINE 25 milliGRAM(s) Oral two times a day  propranolol 10 milliGRAM(s) Oral two times a day    Pulmonary:    Hematalogic:  apixaban 5 milliGRAM(s) Oral every 12 hours    Other:  chlorhexidine 2% Cloths 1 Application(s) Topical <User Schedule>  latanoprost 0.005% Ophthalmic Solution 1 Drop(s) Both EYES at bedtime  levothyroxine 50 MICROGram(s) Oral daily  ondansetron Injectable 4 milliGRAM(s) IV Push every 8 hours PRN  polyethylene glycol 3350 17 Gram(s) Oral two times a day  senna 2 Tablet(s) Oral at bedtime  simvastatin 10 milliGRAM(s) Oral at bedtime  sodium chloride 1 Gram(s) Oral two times a day  timolol 0.5% Solution 1 Drop(s) Both EYES two times a day    amLODIPine   Tablet 5 milliGRAM(s) Oral daily  apixaban 5 milliGRAM(s) Oral every 12 hours  chlorhexidine 2% Cloths 1 Application(s) Topical <User Schedule>  hydrALAZINE 25 milliGRAM(s) Oral two times a day  latanoprost 0.005% Ophthalmic Solution 1 Drop(s) Both EYES at bedtime  levothyroxine 50 MICROGram(s) Oral daily  ondansetron Injectable 4 milliGRAM(s) IV Push every 8 hours PRN  polyethylene glycol 3350 17 Gram(s) Oral two times a day  propranolol 10 milliGRAM(s) Oral two times a day  senna 2 Tablet(s) Oral at bedtime  simvastatin 10 milliGRAM(s) Oral at bedtime  sodium chloride 1 Gram(s) Oral two times a day  timolol 0.5% Solution 1 Drop(s) Both EYES two times a day    Drug Dosing Weight  Height (cm): 154.9 (16 Sep 2021 05:00)  Weight (kg): 66.7 (16 Sep 2021 10:26)  BMI (kg/m2): 27.8 (16 Sep 2021 10:26)  BSA (m2): 1.66 (16 Sep 2021 10:26)    CENTRAL LINE: [ ] YES [ ] NO  LOCATION:         ARROYO: [ ] YES [ ] NO          A-LINE:  [ ] YES [ ] NO  LOCATION:             ICU Vital Signs Last 24 Hrs  T(C): 36.9 (18 Sep 2021 00:06), Max: 36.9 (18 Sep 2021 00:06)  T(F): 98.4 (18 Sep 2021 00:06), Max: 98.4 (18 Sep 2021 00:06)  HR: 60 (18 Sep 2021 00:00) (51 - 65)  BP: 129/65 (18 Sep 2021 00:00) (95/49 - 156/66)  BP(mean): 86 (18 Sep 2021 00:00) (63 - 99)  ABP: --  ABP(mean): --  RR: 16 (18 Sep 2021 00:00) (12 - 24)  SpO2: 98% (18 Sep 2021 00:00) (94% - 100%)            16 @ 07:01  -  - @ 07:00  --------------------------------------------------------  IN: 2340 mL / OUT: 3915 mL / NET: -1575 mL              PHYSICAL EXAM:    GENERAL: NAD  EYES: EOMI, PERRLA  NECK: Supple, No JVD; Normal thyroid; Trachea midline: No LAD   NERVOUS SYSTEM:  Alert & Oriented X3,  Motor Strength 5/5 B/L upper and lower extremities; DTRs 2+ intact and symmetric  CHEST/LUNG: No rales, rhonchi, wheezing, breath sounds present bilaterally  HEART: Regular rate and rhythm; No murmurs, no gallops  ABDOMEN: Soft, Nontender, Nondistended; Bowel sounds present, no pain or masses on palpation  : Arroyo in place  EXTREMITIES:  2+ Peripheral Pulses, No clubbing, cyanosis, or edema  SKIN: warm, intact, no lesions         LABS:  CBC Full  -  ( 17 Sep 2021 03:52 )  WBC Count : 5.77 K/uL  RBC Count : 3.80 M/uL  Hemoglobin : 11.1 g/dL  Hematocrit : 32.4 %  Platelet Count - Automated : 366 K/uL  Mean Cell Volume : 85.3 fl  Mean Cell Hemoglobin : 29.2 pg  Mean Cell Hemoglobin Concentration : 34.3 gm/dL  Auto Neutrophil # : 3.90 K/uL  Auto Lymphocyte # : 0.97 K/uL  Auto Monocyte # : 0.69 K/uL  Auto Eosinophil # : 0.15 K/uL  Auto Basophil # : 0.02 K/uL  Auto Neutrophil % : 67.6 %  Auto Lymphocyte % : 16.8 %  Auto Monocyte % : 12.0 %  Auto Eosinophil % : 2.6 %  Auto Basophil % : 0.3 %        125<L>  |  92<L>  |  13  ----------------------------<  101<H>  4.5   |  24  |  0.65    Ca    9.1      17 Sep 2021 23:43  Phos  3.3       Mg     2.1         TPro  6.9  /  Alb  3.2<L>  /  TBili  0.5  /  DBili  x   /  AST  16  /  ALT  23  /  AlkPhos  76        Urinalysis Basic - ( 16 Sep 2021 08:21 )    Color: Yellow / Appearance: Clear / S.010 / pH: x  Gluc: x / Ketone: Trace  / Bili: Negative / Urobili: Negative   Blood: x / Protein: Negative / Nitrite: Negative   Leuk Esterase: Negative / RBC: x / WBC x   Sq Epi: x / Non Sq Epi: x / Bacteria: x      Culture Results:   No growth ( @ 13:25)  Culture Results:   No growth to date. ( @ 10:32)  Culture Results:   No growth to date. ( @ 10:32)      RADIOLOGY & ADDITIONAL STUDIES REVIEWED:  ***    [ ]GOALS OF CARE DISCUSSION WITH PATIENT/FAMILY/PROXY:    CRITICAL CARE TIME SPENT: 35 minutes   INTERVAL HPI/OVERNIGHT EVENTS: patient's serum sodium was closely monitored overnight, started on Na tabs, no altered mental status, no overnight events     PRESSORS: [ ] YES [ x] NO      Antimicrobial:    Cardiovascular:  amLODIPine   Tablet 5 milliGRAM(s) Oral daily  hydrALAZINE 25 milliGRAM(s) Oral two times a day  propranolol 10 milliGRAM(s) Oral two times a day    Pulmonary:    Hematalogic:  apixaban 5 milliGRAM(s) Oral every 12 hours    Other:  chlorhexidine 2% Cloths 1 Application(s) Topical <User Schedule>  latanoprost 0.005% Ophthalmic Solution 1 Drop(s) Both EYES at bedtime  levothyroxine 50 MICROGram(s) Oral daily  ondansetron Injectable 4 milliGRAM(s) IV Push every 8 hours PRN  polyethylene glycol 3350 17 Gram(s) Oral two times a day  senna 2 Tablet(s) Oral at bedtime  simvastatin 10 milliGRAM(s) Oral at bedtime  sodium chloride 1 Gram(s) Oral two times a day  timolol 0.5% Solution 1 Drop(s) Both EYES two times a day    amLODIPine   Tablet 5 milliGRAM(s) Oral daily  apixaban 5 milliGRAM(s) Oral every 12 hours  chlorhexidine 2% Cloths 1 Application(s) Topical <User Schedule>  hydrALAZINE 25 milliGRAM(s) Oral two times a day  latanoprost 0.005% Ophthalmic Solution 1 Drop(s) Both EYES at bedtime  levothyroxine 50 MICROGram(s) Oral daily  ondansetron Injectable 4 milliGRAM(s) IV Push every 8 hours PRN  polyethylene glycol 3350 17 Gram(s) Oral two times a day  propranolol 10 milliGRAM(s) Oral two times a day  senna 2 Tablet(s) Oral at bedtime  simvastatin 10 milliGRAM(s) Oral at bedtime  sodium chloride 1 Gram(s) Oral two times a day  timolol 0.5% Solution 1 Drop(s) Both EYES two times a day    Drug Dosing Weight  Height (cm): 154.9 (16 Sep 2021 05:00)  Weight (kg): 66.7 (16 Sep 2021 10:26)  BMI (kg/m2): 27.8 (16 Sep 2021 10:26)  BSA (m2): 1.66 (16 Sep 2021 10:26)    CENTRAL LINE: [ ] YES [x ] NO  LOCATION:   DATE INSERTED:  REMOVE: [ ] YES [ ] NO  EXPLAIN:    ARROYO: [x ] YES [ ] NO    DATE INSERTED: 21  REMOVE:  [ ] YES [ ] NO  EXPLAIN:    A-LINE:  [ ] YES [x] NO  LOCATION:   DATE INSERTED:  REMOVE:  [ ] YES [ ] NO  EXPLAIN:          ICU Vital Signs Last 24 Hrs  T(C): 36.9 (18 Sep 2021 00:06), Max: 36.9 (18 Sep 2021 00:06)  T(F): 98.4 (18 Sep 2021 00:06), Max: 98.4 (18 Sep 2021 00:06)  HR: 60 (18 Sep 2021 00:00) (51 - 65)  BP: 129/65 (18 Sep 2021 00:00) (95/49 - 156/66)  BP(mean): 86 (18 Sep 2021 00:00) (63 - 99)  ABP: --  ABP(mean): --  RR: 16 (18 Sep 2021 00:00) (12 - 24)  SpO2: 98% (18 Sep 2021 00:00) (94% - 100%)            16 @ 07:01  -  09-17 @ 07:00  --------------------------------------------------------  IN: 2340 mL / OUT: 3915 mL / NET: -1575 mL              PHYSICAL EXAM:  GENERAL: NAD  EYES: EOMI, PERRLA  NECK: Supple, No JVD; Normal thyroid; Trachea midline: No LAD   NERVOUS SYSTEM:  Alert & Oriented X3,  Motor Strength 5/5 B/L upper and lower extremities; DTRs 2+ intact and symmetric  CHEST/LUNG: No rales, rhonchi, wheezing, breath sounds present bilaterally  HEART: Regular rate and rhythm; No murmurs, no gallops  ABDOMEN: Soft, Nontender, Nondistended; Bowel sounds present, no pain or masses on palpation  : Arroyo in place  EXTREMITIES:  2+ Peripheral Pulses, No clubbing, cyanosis, or edema  SKIN: warm, intact, no lesions         LABS:  CBC Full  -  ( 17 Sep 2021 03:52 )  WBC Count : 5.77 K/uL  RBC Count : 3.80 M/uL  Hemoglobin : 11.1 g/dL  Hematocrit : 32.4 %  Platelet Count - Automated : 366 K/uL  Mean Cell Volume : 85.3 fl  Mean Cell Hemoglobin : 29.2 pg  Mean Cell Hemoglobin Concentration : 34.3 gm/dL  Auto Neutrophil # : 3.90 K/uL  Auto Lymphocyte # : 0.97 K/uL  Auto Monocyte # : 0.69 K/uL  Auto Eosinophil # : 0.15 K/uL  Auto Basophil # : 0.02 K/uL  Auto Neutrophil % : 67.6 %  Auto Lymphocyte % : 16.8 %  Auto Monocyte % : 12.0 %  Auto Eosinophil % : 2.6 %  Auto Basophil % : 0.3 %        125<L>  |  92<L>  |  13  ----------------------------<  101<H>  4.5   |  24  |  0.65    Ca    9.1      17 Sep 2021 23:43  Phos  3.3       Mg     2.1         TPro  6.9  /  Alb  3.2<L>  /  TBili  0.5  /  DBili  x   /  AST  16  /  ALT  23  /  AlkPhos  76        Urinalysis Basic - ( 16 Sep 2021 08:21 )    Color: Yellow / Appearance: Clear / S.010 / pH: x  Gluc: x / Ketone: Trace  / Bili: Negative / Urobili: Negative   Blood: x / Protein: Negative / Nitrite: Negative   Leuk Esterase: Negative / RBC: x / WBC x   Sq Epi: x / Non Sq Epi: x / Bacteria: x      Culture Results:   No growth ( @ 13:25)  Culture Results:   No growth to date. ( @ 10:32)  Culture Results:   No growth to date. ( @ 10:32)      RADIOLOGY & ADDITIONAL STUDIES REVIEWED:        [ ]GOALS OF CARE DISCUSSION WITH PATIENT/FAMILY/PROXY:    CRITICAL CARE TIME SPENT: 35 minutes   INTERVAL HPI/OVERNIGHT EVENTS: Patient's serum sodium was closely monitored overnight. Started on Na tabs, no altered mental status, no overnight events.    PRESSORS: [ ] YES [ x] NO      Antimicrobial:    Cardiovascular:  amLODIPine   Tablet 5 milliGRAM(s) Oral daily  hydrALAZINE 25 milliGRAM(s) Oral two times a day  propranolol 10 milliGRAM(s) Oral two times a day    Pulmonary:    Hematalogic:  apixaban 5 milliGRAM(s) Oral every 12 hours    Other:  chlorhexidine 2% Cloths 1 Application(s) Topical <User Schedule>  latanoprost 0.005% Ophthalmic Solution 1 Drop(s) Both EYES at bedtime  levothyroxine 50 MICROGram(s) Oral daily  ondansetron Injectable 4 milliGRAM(s) IV Push every 8 hours PRN  polyethylene glycol 3350 17 Gram(s) Oral two times a day  senna 2 Tablet(s) Oral at bedtime  simvastatin 10 milliGRAM(s) Oral at bedtime  sodium chloride 1 Gram(s) Oral two times a day  timolol 0.5% Solution 1 Drop(s) Both EYES two times a day    amLODIPine   Tablet 5 milliGRAM(s) Oral daily  apixaban 5 milliGRAM(s) Oral every 12 hours  chlorhexidine 2% Cloths 1 Application(s) Topical <User Schedule>  hydrALAZINE 25 milliGRAM(s) Oral two times a day  latanoprost 0.005% Ophthalmic Solution 1 Drop(s) Both EYES at bedtime  levothyroxine 50 MICROGram(s) Oral daily  ondansetron Injectable 4 milliGRAM(s) IV Push every 8 hours PRN  polyethylene glycol 3350 17 Gram(s) Oral two times a day  propranolol 10 milliGRAM(s) Oral two times a day  senna 2 Tablet(s) Oral at bedtime  simvastatin 10 milliGRAM(s) Oral at bedtime  sodium chloride 1 Gram(s) Oral two times a day  timolol 0.5% Solution 1 Drop(s) Both EYES two times a day    Drug Dosing Weight  Height (cm): 154.9 (16 Sep 2021 05:00)  Weight (kg): 66.7 (16 Sep 2021 10:26)  BMI (kg/m2): 27.8 (16 Sep 2021 10:26)  BSA (m2): 1.66 (16 Sep 2021 10:26)    CENTRAL LINE: [ ] YES [x ] NO  LOCATION:   DATE INSERTED:  REMOVE: [ ] YES [ ] NO  EXPLAIN:    ARROYO: [x ] YES [ ] NO    DATE INSERTED: 21  REMOVE:  [ ] YES [ ] NO  EXPLAIN:    A-LINE:  [ ] YES [x] NO  LOCATION:   DATE INSERTED:  REMOVE:  [ ] YES [ ] NO  EXPLAIN:          ICU Vital Signs Last 24 Hrs  T(C): 36.9 (18 Sep 2021 00:06), Max: 36.9 (18 Sep 2021 00:06)  T(F): 98.4 (18 Sep 2021 00:06), Max: 98.4 (18 Sep 2021 00:06)  HR: 60 (18 Sep 2021 00:00) (51 - 65)  BP: 129/65 (18 Sep 2021 00:00) (95/49 - 156/66)  BP(mean): 86 (18 Sep 2021 00:00) (63 - 99)  ABP: --  ABP(mean): --  RR: 16 (18 Sep 2021 00:00) (12 - 24)  SpO2: 98% (18 Sep 2021 00:00) (94% - 100%)            16 @ 07:01  -  09-17 @ 07:00  --------------------------------------------------------  IN: 2340 mL / OUT: 3915 mL / NET: -1575 mL              PHYSICAL EXAM:  GENERAL: NAD  EYES: EOMI, PERRLA  NECK: Supple, No JVD; Normal thyroid; Trachea midline: No LAD   NERVOUS SYSTEM:  Alert & Oriented X3,  Motor Strength 5/5 B/L upper and lower extremities; DTRs 2+ intact and symmetric  CHEST/LUNG: No rales, rhonchi, wheezing, breath sounds present bilaterally  HEART: Regular rate and rhythm; No murmurs, no gallops  ABDOMEN: Soft, Nontender, Nondistended; Bowel sounds present, no pain or masses on palpation  : Arroyo in place  EXTREMITIES:  2+ Peripheral Pulses, No clubbing, cyanosis, or edema  SKIN: warm, intact, no lesions         LABS:  CBC Full  -  ( 17 Sep 2021 03:52 )  WBC Count : 5.77 K/uL  RBC Count : 3.80 M/uL  Hemoglobin : 11.1 g/dL  Hematocrit : 32.4 %  Platelet Count - Automated : 366 K/uL  Mean Cell Volume : 85.3 fl  Mean Cell Hemoglobin : 29.2 pg  Mean Cell Hemoglobin Concentration : 34.3 gm/dL  Auto Neutrophil # : 3.90 K/uL  Auto Lymphocyte # : 0.97 K/uL  Auto Monocyte # : 0.69 K/uL  Auto Eosinophil # : 0.15 K/uL  Auto Basophil # : 0.02 K/uL  Auto Neutrophil % : 67.6 %  Auto Lymphocyte % : 16.8 %  Auto Monocyte % : 12.0 %  Auto Eosinophil % : 2.6 %  Auto Basophil % : 0.3 %        125<L>  |  92<L>  |  13  ----------------------------<  101<H>  4.5   |  24  |  0.65    Ca    9.1      17 Sep 2021 23:43  Phos  3.3       Mg     2.1         TPro  6.9  /  Alb  3.2<L>  /  TBili  0.5  /  DBili  x   /  AST  16  /  ALT  23  /  AlkPhos  76        Urinalysis Basic - ( 16 Sep 2021 08:21 )    Color: Yellow / Appearance: Clear / S.010 / pH: x  Gluc: x / Ketone: Trace  / Bili: Negative / Urobili: Negative   Blood: x / Protein: Negative / Nitrite: Negative   Leuk Esterase: Negative / RBC: x / WBC x   Sq Epi: x / Non Sq Epi: x / Bacteria: x      Culture Results:   No growth ( @ 13:25)  Culture Results:   No growth to date. ( @ 10:32)  Culture Results:   No growth to date. ( @ 10:32)      RADIOLOGY & ADDITIONAL STUDIES REVIEWED:        [ ]GOALS OF CARE DISCUSSION WITH PATIENT/FAMILY/PROXY:    CRITICAL CARE TIME SPENT: 35 minutes

## 2021-09-18 NOTE — PROGRESS NOTE ADULT - ASSESSMENT
Pt 79 yo F from home w/ pmhx of afib on propanolol and xarelto, HTN, w/ CC of N & V. On ED found with Na 116. Admitted for asymptomatic hypovolemic hyponatremia 2/2 dehydration. Pt was recently in the ICU with similar chief complaint.       Plan:  Neuro:  - Appears to be at baseline mental status, AAOx3.  - Neurological exam unremarkable.    CV:  # HTN  -On amlodipine, hydralazine and propanolol at home  -Resumed home meds w/ parameters  -BP well controlled       #Paroxysmal Atrial Fibrillation  - On propanolol and eliquis at home.  - Rate controlled  - continue home meds    Pulm:  # No acute issues  - Saturating well on RA, no SOB, tachypnea. CXR clear    ID:  # No acute issues  - cxr unremarkable, UA clear    Nephro:  # Hyponatremia   - Decreased serum osm, appears dehydrated- likely hypotonic hypovolemic hyponatremia 2/2 extra renal losses  - Although urine Na increased, patient noted on diuretics.  - Yesterday pt with increased UO. Given desmopressin injection.  - s/p DW5 bolus and D5W 120 cc/hr   - Na has corrected from 116 (admission) to 121 today,   - d/jordan IV fluids  - Started on salt tablets and changed diet to regula  - CMP to be repeated today at 5 pm   - Nephro, Dr. Benoit consulted  - Endo Dr. Lomas consulted, she recommends to repeat TSH and free T4 levels. Also ordered serum cortisol levels for tomorrow at AM  - If cortisol levels<15, pt will need to have stim test    GI:  # Nausea  - asymptomatic at this time    Heme:  #Borderline anemia  - H/H stable  - Iron studies from prior admission noted.  - No indication for transfusion    Endo:  #Hypothyroidism  - Continue home dose levothyroxine  - Noted elevated TSH levels  - We'll repeat TSH levels (given discrepancy between prior admission and latest admission levels), free T4       Prophy:  - resume DOAC< Eliquis  - NO PPI as pt is eating    Dispo:  - Continue in ICU for further monitoring of hyponatremia.     Pt 79 yo F from home w/ pmhx of afib on propanolol and xarelto, HTN, w/ CC of N & V. On ED found with Na 116. Admitted for asymptomatic hypovolemic hyponatremia 2/2 dehydration. Pt was recently in the ICU with similar chief complaint.       Plan:  Neuro:  - Appears to be at baseline mental status, AAOx3.  - Neurological exam unremarkable.    CV:  # HTN  -On amlodipine, hydralazine and propanolol at home  -Resumed home meds w/ parameters  -BP well controlled       #Paroxysmal Atrial Fibrillation  - On propanolol and eliquis at home.  - Rate controlled  - continue home meds    Pulm:  # No acute issues  - Saturating well on RA, no SOB, tachypnea. CXR clear    ID:  # No acute issues  - cxr unremarkable, UA clear    Nephro:  # Hyponatremia   - Decreased serum osm, appears dehydrated- likely hypotonic hypovolemic hyponatremia 2/2 extra renal losses  - Although urine Na increased, patient noted on diuretics.  - Yesterday pt with increased UO. Given desmopressin injection.  - s/p DW5 bolus and D5W 120 cc/hr   - Na has corrected from 116 (admission) to 121 today,   - d/jordan IV fluids  - Started on salt tablets and changed diet to regula  - CMP to be repeated today at 5 pm   - Nephro, Dr. Benoit consulted  - Endo Dr. Lomas consulted  - elevated TSH 6.88  - f/u free T4  - f/u 8am cortisol,   - If cortisol levels<15, pt will need to have stim test  - Goal for 9/18 AM is 128~130  - Goal for 9/19 AM is 134~136    GI:  # Nausea  - asymptomatic at this time    Heme:  #Borderline anemia  - H/H stable  - Iron studies from prior admission noted.  - No indication for transfusion    Endo:  #Hypothyroidism  - Continue home dose levothyroxine  - Noted elevated TSH levels  -repeat TSH = 6.88  - f/u free T4       Prophy:  - Eliquis  - NO PPI as pt is eating    Dispo:  - Continue in ICU for further monitoring of hyponatremia.     Pt 81 yo F from home w/ pmhx of afib on propanolol and xarelto, HTN, w/ CC of N & V. On ED found with Na 116. Admitted for asymptomatic hypovolemic hyponatremia 2/2 dehydration. Pt was recently in the ICU with similar chief complaint.       Plan:  Neuro:  - Appears to be at baseline mental status, AAOx3.  - Neurological exam unremarkable.    CV:  # HTN  -On amlodipine, hydralazine and propanolol at home  -Resumed home meds w/ parameters  -BP well controlled       #Paroxysmal Atrial Fibrillation  - On propanolol and eliquis at home.  - Rate controlled  - continue home meds    Pulm:  # No acute issues  - Saturating well on RA, no SOB, tachypnea. CXR clear    ID:  # No acute issues  - cxr unremarkable, UA clear    Nephro:  # Hyponatremia   - Decreased serum osm, appears dehydrated- likely hypotonic hypovolemic hyponatremia 2/2 extra renal losses  - Although urine Na increased, patient noted on diuretics.  - Yesterday pt with increased UO. Given desmopressin injection.  - s/p DW5 bolus and D5W 120 cc/hr   - Na has corrected from 116 (admission) to 121 today,   - d/jordan IV fluids  - Started on salt tablets and changed diet to regula  - CMP to be repeated today at 5 pm   - Nephro, Dr. Benoit consulted  - Endo Dr. Lomas consulted  - elevated TSH 6.88  - f/u free T4  - f/u 8am cortisol,   - If cortisol levels<15, pt will need to have stim test  - Nephro Dr. Knutson =  Will give NaCl tab 1g this am, but then stop NaCl tabs.  Repeat na+ Q8h.  If Na+>135, then start gentle D5W IVF hydration.  - Goal for 9/18 AM is 128~130  - Goal for 9/19 AM is 134~136    GI:  # Nausea  - asymptomatic at this time    Heme:  #Borderline anemia  - H/H stable  - Iron studies from prior admission noted.  - No indication for transfusion    Endo:  #Hypothyroidism  - Continue home dose levothyroxine  - Noted elevated TSH levels  -repeat TSH = 6.88  - f/u free T4       Prophy:  - Eliquis  - NO PPI as pt is eating    Dispo:  - Continue in ICU for further monitoring of hyponatremia.

## 2021-09-18 NOTE — PROGRESS NOTE ADULT - SUBJECTIVE AND OBJECTIVE BOX
[facility] NEPHROLOGY- PROGRESS NOTE    80y Georgian speaking  Female with history of HTN, afib presents with vomiting and tremors. Serum Na 116 in ER. Nephrology consulted for hyponatremia.    Hospital Medications: Medications reviewed.  REVIEW OF SYSTEMS: Georgian  # [id number]  CONSTITUTIONAL: No fevers or chills +dizziness  RESPIRATORY: No shortness of breath  CARDIOVASCULAR: No chest pain.  GASTROINTESTINAL: No nausea, vomiting, or diarrhea. RLQ abdominal pain.  +constipation  VASCULAR: No bilateral lower extremity edema.  +Rt hip pain    VITALS:  T(F): 97.5 (21 @ 04:01), Max: 98.4 (21 @ 00:06)  HR: 60 (21 @ 11:00)  BP: 95/84 (21 @ 11:00)  RR: 25 (21 @ 11:00)  SpO2: 100% (21 @ 11:00)     @ 07:01  -   @ 07:00  --------------------------------------------------------  IN: 570 mL / OUT: 3285 mL / NET: -2715 mL     @ 07:01  -   @ 11:49  --------------------------------------------------------  IN: 0 mL / OUT: 275 mL / NET: -275 mL      PHYSICAL EXAM:  Constitutional: NAD  Neurological: Awake and Alert  HEENT: anicteric sclera,   Respiratory: CTAB, no wheezes, rales or rhonchi  Cardiovascular: S1, S2, RRR  Gastrointestinal: BS+, soft, NT/ND  : No ventura.  Extremities: No peripheral edema    LABS:    131 <--, 129 <--, 125 <--, 121 <--, 121 <--, 121 <--, 119 <--  131<L>  |  96  |  12  ----------------------------<  99  3.7   |  27  |  0.77    Ca    9.2      18 Sep 2021 10:31  Phos  3.2       Mg     2.2         TPro  6.6  /  Alb  3.1<L>  /  TBili  0.5  /  DBili      /  AST  13  /  ALT  20  /  AlkPhos  72      Creatinine Trend: 0.77 <--, 0.66 <--, 0.65 <--, 0.78 <--, 0.70 <--, 0.60 <--, 0.65 <--, 0.79 <--, 0.66 <--, 0.52 <--, 0.50 <--, 0.58 <--             Cortisol PM, Serum (21 @ 09:22)    Cortisol PM, Serum: 5.3 ug/dL                 11.7   5.50  )-----------( 366      ( 18 Sep 2021 04:36 )             33.9     Urine Studies:  Urinalysis Basic - ( 16 Sep 2021 08:21 )    Color: Yellow / Appearance: Clear / S.010 / pH:   Gluc:  / Ketone: Trace  / Bili: Negative / Urobili: Negative   Blood:  / Protein: Negative / Nitrite: Negative   Leuk Esterase: Negative / RBC:  / WBC    Sq Epi:  / Non Sq Epi:  / Bacteria:       Sodium, Random Urine: 24 mmol/L ( @ 08:39)  Osmolality, Random Urine: 175 mos/kg ( @ 08:39)  Chloride, Random Urine: 29 mmol/L ( @ 08:39)  Osmolality, Random Urine: 291 mos/kg ( @ 03:52)  Sodium, Random Urine: 54 mmol/L ( @ 03:52)  Sodium, Random Urine: 37 mmol/L ( @ 14:43)  Osmolality, Random Urine: 365 mos/kg ( @ 14:42)  Osmolality, Random Urine: 213 mos/kg ( @ 08:21)  Sodium, Random Urine: 56 mmol/L ( @ 08:21)  Creatinine, Random Urine: <13 mg/dL ( @ 08:21)

## 2021-09-19 LAB
ALBUMIN SERPL ELPH-MCNC: 3.1 G/DL — LOW (ref 3.5–5)
ALP SERPL-CCNC: 75 U/L — SIGNIFICANT CHANGE UP (ref 40–120)
ALT FLD-CCNC: 20 U/L DA — SIGNIFICANT CHANGE UP (ref 10–60)
ANION GAP SERPL CALC-SCNC: 7 MMOL/L — SIGNIFICANT CHANGE UP (ref 5–17)
AST SERPL-CCNC: 8 U/L — LOW (ref 10–40)
BILIRUB SERPL-MCNC: 0.3 MG/DL — SIGNIFICANT CHANGE UP (ref 0.2–1.2)
BUN SERPL-MCNC: 18 MG/DL — SIGNIFICANT CHANGE UP (ref 7–18)
CALCIUM SERPL-MCNC: 9 MG/DL — SIGNIFICANT CHANGE UP (ref 8.4–10.5)
CHLORIDE SERPL-SCNC: 99 MMOL/L — SIGNIFICANT CHANGE UP (ref 96–108)
CO2 SERPL-SCNC: 27 MMOL/L — SIGNIFICANT CHANGE UP (ref 22–31)
CREAT SERPL-MCNC: 0.81 MG/DL — SIGNIFICANT CHANGE UP (ref 0.5–1.3)
GLUCOSE SERPL-MCNC: 102 MG/DL — HIGH (ref 70–99)
HCT VFR BLD CALC: 33.3 % — LOW (ref 34.5–45)
HGB BLD-MCNC: 11.2 G/DL — LOW (ref 11.5–15.5)
MAGNESIUM SERPL-MCNC: 2.3 MG/DL — SIGNIFICANT CHANGE UP (ref 1.6–2.6)
MCHC RBC-ENTMCNC: 29.9 PG — SIGNIFICANT CHANGE UP (ref 27–34)
MCHC RBC-ENTMCNC: 33.6 GM/DL — SIGNIFICANT CHANGE UP (ref 32–36)
MCV RBC AUTO: 88.8 FL — SIGNIFICANT CHANGE UP (ref 80–100)
NRBC # BLD: 0 /100 WBCS — SIGNIFICANT CHANGE UP (ref 0–0)
PHOSPHATE SERPL-MCNC: 3.5 MG/DL — SIGNIFICANT CHANGE UP (ref 2.5–4.5)
PLATELET # BLD AUTO: 356 K/UL — SIGNIFICANT CHANGE UP (ref 150–400)
POTASSIUM SERPL-MCNC: 4.6 MMOL/L — SIGNIFICANT CHANGE UP (ref 3.5–5.3)
POTASSIUM SERPL-SCNC: 4.6 MMOL/L — SIGNIFICANT CHANGE UP (ref 3.5–5.3)
PROT SERPL-MCNC: 6.5 G/DL — SIGNIFICANT CHANGE UP (ref 6–8.3)
RBC # BLD: 3.75 M/UL — LOW (ref 3.8–5.2)
RBC # FLD: 14.2 % — SIGNIFICANT CHANGE UP (ref 10.3–14.5)
SODIUM SERPL-SCNC: 133 MMOL/L — LOW (ref 135–145)
WBC # BLD: 7.58 K/UL — SIGNIFICANT CHANGE UP (ref 3.8–10.5)
WBC # FLD AUTO: 7.58 K/UL — SIGNIFICANT CHANGE UP (ref 3.8–10.5)

## 2021-09-19 RX ADMIN — SENNA PLUS 2 TABLET(S): 8.6 TABLET ORAL at 21:25

## 2021-09-19 RX ADMIN — LATANOPROST 1 DROP(S): 0.05 SOLUTION/ DROPS OPHTHALMIC; TOPICAL at 21:24

## 2021-09-19 RX ADMIN — POLYETHYLENE GLYCOL 3350 17 GRAM(S): 17 POWDER, FOR SOLUTION ORAL at 05:45

## 2021-09-19 RX ADMIN — POLYETHYLENE GLYCOL 3350 17 GRAM(S): 17 POWDER, FOR SOLUTION ORAL at 17:50

## 2021-09-19 RX ADMIN — Medication 1 DROP(S): at 05:44

## 2021-09-19 RX ADMIN — SIMVASTATIN 10 MILLIGRAM(S): 20 TABLET, FILM COATED ORAL at 21:25

## 2021-09-19 RX ADMIN — CHLORHEXIDINE GLUCONATE 1 APPLICATION(S): 213 SOLUTION TOPICAL at 05:44

## 2021-09-19 RX ADMIN — Medication 50 MICROGRAM(S): at 05:45

## 2021-09-19 RX ADMIN — Medication 1 DROP(S): at 17:46

## 2021-09-19 RX ADMIN — AMLODIPINE BESYLATE 5 MILLIGRAM(S): 2.5 TABLET ORAL at 05:45

## 2021-09-19 RX ADMIN — APIXABAN 5 MILLIGRAM(S): 2.5 TABLET, FILM COATED ORAL at 05:44

## 2021-09-19 RX ADMIN — APIXABAN 5 MILLIGRAM(S): 2.5 TABLET, FILM COATED ORAL at 17:45

## 2021-09-19 NOTE — PROGRESS NOTE ADULT - SUBJECTIVE AND OBJECTIVE BOX
[facility] NEPHROLOGY- PROGRESS NOTE    80y [place] speaking  Female with history of HTN, afib presents with vomiting and tremors. Serum Na 116 in ER. Nephrology consulted for hyponatremia.    Hospital Medications: Medications reviewed.  REVIEW OF SYSTEMS: [place]  # [id number]  CONSTITUTIONAL: No fevers or chills +dizziness  RESPIRATORY: No shortness of breath  CARDIOVASCULAR: No chest pain.  GASTROINTESTINAL: No nausea, vomiting, or diarrhea. RLQ abdominal pain.  +constipation  VASCULAR: No bilateral lower extremity edema.  +Rt hip pain    VITALS:  T(F): 97 (21 @ 13:46), Max: 97.9 (21 @ 16:17)  HR: 72 (21 @ 13:46)  BP: 111/58 (21 @ 13:46)  RR: 18 (21 @ 13:46)  SpO2: 97% (21 @ 13:46)  Wt(kg): --     @ 07:01  -   @ 07:00  --------------------------------------------------------  IN: 0 mL / OUT: 1815 mL / NET: -1815 mL     @ 07:01  -   @ 15:45  --------------------------------------------------------  IN: 0 mL / OUT: 225 mL / NET: -225 mL      PHYSICAL EXAM:  Constitutional: NAD  Neurological: Awake and Alert  HEENT: anicteric sclera,   Respiratory: CTAB, no wheezes, rales or rhonchi  Cardiovascular: S1, S2, RRR  Gastrointestinal: BS+, soft, NT/ND  : No ventura.  Extremities: No peripheral edema      LABS:    133 <--, 130 <--, 131 <--, 129 <--, 125 <--, 121 <--, 121 <--  133<L>  |  99  |  18  ----------------------------<  102<H>  4.6   |  27  |  0.81    Ca    9.0      19 Sep 2021 04:22  Phos  3.5       Mg     2.3         TPro  6.5  /  Alb  3.1<L>  /  TBili  0.3  /  DBili      /  AST  8<L>  /  ALT  20  /  AlkPhos  75      Creatinine Trend: 0.81 <--, 0.99 <--, 0.77 <--, 0.66 <--, 0.65 <--, 0.78 <--, 0.70 <--, 0.60 <--, 0.65 <--, 0.79 <--, 0.66 <--, 0.52 <--, 0.50 <--, 0.58 <--                        11.2   7.58  )-----------( 356      ( 19 Sep 2021 04:22 )             33.3     Urine Studies:  Urinalysis Basic - ( 16 Sep 2021 08:21 )    Color: Yellow / Appearance: Clear / S.010 / pH:   Gluc:  / Ketone: Trace  / Bili: Negative / Urobili: Negative   Blood:  / Protein: Negative / Nitrite: Negative   Leuk Esterase: Negative / RBC:  / WBC    Sq Epi:  / Non Sq Epi:  / Bacteria:       Sodium, Random Urine: 24 mmol/L ( @ 08:39)  Osmolality, Random Urine: 175 mos/kg ( @ 08:39)  Chloride, Random Urine: 29 mmol/L ( @ 08:39)  Osmolality, Random Urine: 291 mos/kg ( @ 03:52)  Sodium, Random Urine: 54 mmol/L ( @ 03:52)  Sodium, Random Urine: 37 mmol/L ( @ 14:43)  Osmolality, Random Urine: 365 mos/kg ( @ 14:42)  Osmolality, Random Urine: 213 mos/kg ( @ 08:21)  Sodium, Random Urine: 56 mmol/L ( @ 08:21)  Creatinine, Random Urine: <13 mg/dL ( @ 08:21)

## 2021-09-19 NOTE — PROGRESS NOTE ADULT - SUBJECTIVE AND OBJECTIVE BOX
Interval Events:  pt in nad    Allergies    No Known Allergies    Intolerances      Endocrine/Metabolic Medications:  levothyroxine 50 MICROGram(s) Oral daily  simvastatin 10 milliGRAM(s) Oral at bedtime      Vital Signs Last 24 Hrs  T(C): 36.4 (18 Sep 2021 23:31), Max: 36.6 (18 Sep 2021 16:17)  T(F): 97.6 (18 Sep 2021 23:31), Max: 97.9 (18 Sep 2021 16:17)  HR: 68 (19 Sep 2021 09:55) (56 - 68)  BP: 107/57 (19 Sep 2021 09:55) (89/50 - 132/71)  BP(mean): 69 (19 Sep 2021 09:00) (63 - 90)  RR: 16 (19 Sep 2021 09:55) (13 - 21)  SpO2: 98% (19 Sep 2021 09:55) (95% - 99%)      PHYSICAL EXAM  All physical exam findings normal, except those marked:  General:	Alert, active, cooperative, NAD, well hydrated  .		[] Abnormal:  Neck		Normal: supple, no cervical adenopathy, no palpable thyroid  .		[] Abnormal:  Cardiovascular	Normal: regular rate, normal S1, S2, no murmurs  .		[] Abnormal:  Respiratory	Normal: no chest wall deformity, normal respiratory pattern, CTA B/L  .		[] Abnormal:  Abdominal	Normal: soft, ND, NT, bowel sounds present, no masses, no organomegaly  .		[] Abnormal:  		Normal normal genitalia, testes descended, circumcised/uncircumcised  .		Zachary stage:			Breast zachary:  .		Menstrual history:  .		[] Abnormal:  Extremities	Normal: FROM x4  .		[] Abnormal:  Skin		Normal: intact and not indurated, no rash, no acanthosis nigricans  .		[] Abnormal:  Neurologic	Normal: grossly intact  .		[] Abnormal:    LABS                        11.2   7.58  )-----------( 356      ( 19 Sep 2021 04:22 )             33.3                               133    |  99     |  18                  Calcium: 9.0   / iCa: x      (09-19 @ 04:22)    ----------------------------<  102       Magnesium: 2.3                              4.6     |  27     |  0.81             Phosphorous: 3.5      TPro  6.5    /  Alb  3.1    /  TBili  0.3    /  DBili  x      /  AST  8      /  ALT  20     /  AlkPhos  75     19 Sep 2021 04:22    CAPILLARY BLOOD GLUCOSE            Assesment/plan    Patient is an 80F from home, lives with daughter, with PMHx of HTN, HLD, hypothyroidism, atrial fibrillation on Eliquis, recurrent hyponatremia, presenting with chief complaint of vomiting and tremors. Hx from chart review and icu team / left message with daughter.        Problem/Recommendation - 1:  ·  Problem: Hyponatremia.   ·  Recommendation: hypotonic hyponatremia - improving  likely due to nausea/ vomiting   tx per nephro  r/o AI- serum cortisol 5.5 last admission ? random level  am cortisol- 17.4   no further testing or intervention     Problem/Recommendation - 2:  ·  Problem: Hypothyroidism.   ·  Recommendation: cont lt4 50 mcg  tsh mildly elevated   free t4 level- 1.5  f/u tfts as out tp

## 2021-09-19 NOTE — PROGRESS NOTE ADULT - SUBJECTIVE AND OBJECTIVE BOX
INTERVAL HPI/OVERNIGHT EVENTS: No adverse events overnight.     PRESSORS: [ ] YES [x] NO    Antimicrobial:    Cardiovascular:  amLODIPine   Tablet 5 milliGRAM(s) Oral daily  hydrALAZINE 25 milliGRAM(s) Oral two times a day  propranolol 10 milliGRAM(s) Oral two times a day    Pulmonary:    Hematalogic:  apixaban 5 milliGRAM(s) Oral every 12 hours    Other:  chlorhexidine 2% Cloths 1 Application(s) Topical <User Schedule>  latanoprost 0.005% Ophthalmic Solution 1 Drop(s) Both EYES at bedtime  levothyroxine 50 MICROGram(s) Oral daily  ondansetron Injectable 4 milliGRAM(s) IV Push every 8 hours PRN  polyethylene glycol 3350 17 Gram(s) Oral two times a day  senna 2 Tablet(s) Oral at bedtime  simvastatin 10 milliGRAM(s) Oral at bedtime  timolol 0.5% Solution 1 Drop(s) Both EYES two times a day    amLODIPine   Tablet 5 milliGRAM(s) Oral daily  apixaban 5 milliGRAM(s) Oral every 12 hours  chlorhexidine 2% Cloths 1 Application(s) Topical <User Schedule>  hydrALAZINE 25 milliGRAM(s) Oral two times a day  latanoprost 0.005% Ophthalmic Solution 1 Drop(s) Both EYES at bedtime  levothyroxine 50 MICROGram(s) Oral daily  ondansetron Injectable 4 milliGRAM(s) IV Push every 8 hours PRN  polyethylene glycol 3350 17 Gram(s) Oral two times a day  propranolol 10 milliGRAM(s) Oral two times a day  senna 2 Tablet(s) Oral at bedtime  simvastatin 10 milliGRAM(s) Oral at bedtime  timolol 0.5% Solution 1 Drop(s) Both EYES two times a day    Drug Dosing Weight  Height (cm): 154.9 (16 Sep 2021 05:00)  Weight (kg): 66.7 (16 Sep 2021 10:26)  BMI (kg/m2): 27.8 (16 Sep 2021 10:26)  BSA (m2): 1.66 (16 Sep 2021 10:26)    CENTRAL LINE: [ ] YES [x ] NO  LOCATION:   DATE INSERTED:  REMOVE: [ ] YES [ ] NO  EXPLAIN:    ARROYO: [x ] YES [ ] NO    DATE INSERTED: 9/16/21  REMOVE:  [ ] YES [ ] NO  EXPLAIN:    A-LINE:  [ ] YES [x] NO  LOCATION:   DATE INSERTED:  REMOVE:  [ ] YES [ ] NO  EXPLAIN:      ICU Vital Signs Last 24 Hrs  T(C): 36.4 (18 Sep 2021 23:31), Max: 36.6 (18 Sep 2021 16:17)  T(F): 97.6 (18 Sep 2021 23:31), Max: 97.9 (18 Sep 2021 16:17)  HR: 61 (19 Sep 2021 00:00) (51 - 68)  BP: 113/52 (19 Sep 2021 00:00) (83/46 - 121/63)  BP(mean): 67 (19 Sep 2021 00:00) (59 - 90)  ABP: --  ABP(mean): --  RR: 15 (19 Sep 2021 00:00) (12 - 25)  SpO2: 96% (19 Sep 2021 00:00) (95% - 100%)            09-17 @ 07:01  -  09-18 @ 07:00  --------------------------------------------------------  IN: 570 mL / OUT: 3285 mL / NET: -2715 mL    PHYSICAL EXAM:  GENERAL: NAD  EYES: EOMI, PERRLA  NECK: Supple, No JVD; Normal thyroid; Trachea midline: No LAD   NERVOUS SYSTEM:  Alert & Oriented X3,  Motor Strength 5/5 B/L upper and lower extremities; DTRs 2+ intact and symmetric  CHEST/LUNG: No rales, rhonchi, wheezing, breath sounds present bilaterally  HEART: Regular rate and rhythm; No murmurs, no gallops  ABDOMEN: Soft, Nontender, Nondistended; Bowel sounds present, no pain or masses on palpation  : Arroyo in place  EXTREMITIES:  2+ Peripheral Pulses, No clubbing, cyanosis, or edema  SKIN: warm, intact, no lesions       LABS:  CBC Full  -  ( 18 Sep 2021 04:36 )  WBC Count : 5.50 K/uL  RBC Count : 3.91 M/uL  Hemoglobin : 11.7 g/dL  Hematocrit : 33.9 %  Platelet Count - Automated : 366 K/uL  Mean Cell Volume : 86.7 fl  Mean Cell Hemoglobin : 29.9 pg  Mean Cell Hemoglobin Concentration : 34.5 gm/dL  Auto Neutrophil # : x  Auto Lymphocyte # : x  Auto Monocyte # : x  Auto Eosinophil # : x  Auto Basophil # : x  Auto Neutrophil % : x  Auto Lymphocyte % : x  Auto Monocyte % : x  Auto Eosinophil % : x  Auto Basophil % : x    09-18    130<L>  |  99  |  19<H>  ----------------------------<  136<H>  4.5   |  22  |  0.99    Ca    8.8      18 Sep 2021 20:50  Phos  3.2     09-18  Mg     2.2     09-18    TPro  6.6  /  Alb  3.1<L>  /  TBili  0.5  /  DBili  x   /  AST  13  /  ALT  20  /  AlkPhos  72  09-18        Culture Results:   No growth (09-16 @ 13:25)  Culture Results:   No growth to date. (09-16 @ 10:32)  Culture Results:   No growth to date. (09-16 @ 10:32)      RADIOLOGY & ADDITIONAL STUDIES REVIEWED:  ***    [ ]GOALS OF CARE DISCUSSION WITH PATIENT/FAMILY/PROXY:    CRITICAL CARE TIME SPENT: 35 minutes INTERVAL HPI/OVERNIGHT EVENTS: No adverse events overnight. Sodium correcting well. Initial workup for adrenal insuff. negative. Patient for downgrade to medicine.    PRESSORS: [ ] YES [x] NO    Antimicrobial:    Cardiovascular:  amLODIPine   Tablet 5 milliGRAM(s) Oral daily  hydrALAZINE 25 milliGRAM(s) Oral two times a day  propranolol 10 milliGRAM(s) Oral two times a day    Pulmonary:    Hematalogic:  apixaban 5 milliGRAM(s) Oral every 12 hours    Other:  chlorhexidine 2% Cloths 1 Application(s) Topical <User Schedule>  latanoprost 0.005% Ophthalmic Solution 1 Drop(s) Both EYES at bedtime  levothyroxine 50 MICROGram(s) Oral daily  ondansetron Injectable 4 milliGRAM(s) IV Push every 8 hours PRN  polyethylene glycol 3350 17 Gram(s) Oral two times a day  senna 2 Tablet(s) Oral at bedtime  simvastatin 10 milliGRAM(s) Oral at bedtime  timolol 0.5% Solution 1 Drop(s) Both EYES two times a day    amLODIPine   Tablet 5 milliGRAM(s) Oral daily  apixaban 5 milliGRAM(s) Oral every 12 hours  chlorhexidine 2% Cloths 1 Application(s) Topical <User Schedule>  hydrALAZINE 25 milliGRAM(s) Oral two times a day  latanoprost 0.005% Ophthalmic Solution 1 Drop(s) Both EYES at bedtime  levothyroxine 50 MICROGram(s) Oral daily  ondansetron Injectable 4 milliGRAM(s) IV Push every 8 hours PRN  polyethylene glycol 3350 17 Gram(s) Oral two times a day  propranolol 10 milliGRAM(s) Oral two times a day  senna 2 Tablet(s) Oral at bedtime  simvastatin 10 milliGRAM(s) Oral at bedtime  timolol 0.5% Solution 1 Drop(s) Both EYES two times a day    Drug Dosing Weight  Height (cm): 154.9 (16 Sep 2021 05:00)  Weight (kg): 66.7 (16 Sep 2021 10:26)  BMI (kg/m2): 27.8 (16 Sep 2021 10:26)  BSA (m2): 1.66 (16 Sep 2021 10:26)    CENTRAL LINE: [ ] YES [x ] NO  LOCATION:   DATE INSERTED:  REMOVE: [ ] YES [ ] NO  EXPLAIN:    ARROYO: [x ] YES [ ] NO    DATE INSERTED: 9/16/21  REMOVE:  [ ] YES [ ] NO  EXPLAIN:    A-LINE:  [ ] YES [x] NO  LOCATION:   DATE INSERTED:  REMOVE:  [ ] YES [ ] NO  EXPLAIN:      ICU Vital Signs Last 24 Hrs  T(C): 36.4 (18 Sep 2021 23:31), Max: 36.6 (18 Sep 2021 16:17)  T(F): 97.6 (18 Sep 2021 23:31), Max: 97.9 (18 Sep 2021 16:17)  HR: 61 (19 Sep 2021 00:00) (51 - 68)  BP: 113/52 (19 Sep 2021 00:00) (83/46 - 121/63)  BP(mean): 67 (19 Sep 2021 00:00) (59 - 90)  ABP: --  ABP(mean): --  RR: 15 (19 Sep 2021 00:00) (12 - 25)  SpO2: 96% (19 Sep 2021 00:00) (95% - 100%)            09-17 @ 07:01  -  09-18 @ 07:00  --------------------------------------------------------  IN: 570 mL / OUT: 3285 mL / NET: -2715 mL    PHYSICAL EXAM:  GENERAL: elderly, well developed, well nourished, no acute distress,   HEAD:  Atraumatic, Normocephalic  EYES: EOMI, PERRLA, conjunctiva and sclera clear, no nystagmus  ENT: dry mucous membranes  NECK: Supple, No JVD, Normal thyroid, no enlarged nodes  NERVOUS SYSTEM:  Alert & Oriented X2, to person and place. No focal deficits  CHEST/LUNG: clear to auscultation bilaterally, no rhonchi wheezes   HEART: +s1.s2 regular  ABDOMEN: Soft, Nontender, Nondistended; Bowel sounds present  EXTREMITIES:  2+ Peripheral Pulses, No clubbing, cyanosis, or edema  LYMPH: No lymphadenopathy noted  SKIN: No rashes or lesions        LABS:  CBC Full  -  ( 18 Sep 2021 04:36 )  WBC Count : 5.50 K/uL  RBC Count : 3.91 M/uL  Hemoglobin : 11.7 g/dL  Hematocrit : 33.9 %  Platelet Count - Automated : 366 K/uL  Mean Cell Volume : 86.7 fl  Mean Cell Hemoglobin : 29.9 pg  Mean Cell Hemoglobin Concentration : 34.5 gm/dL  Auto Neutrophil # : x  Auto Lymphocyte # : x  Auto Monocyte # : x  Auto Eosinophil # : x  Auto Basophil # : x  Auto Neutrophil % : x  Auto Lymphocyte % : x  Auto Monocyte % : x  Auto Eosinophil % : x  Auto Basophil % : x    09-18    130<L>  |  99  |  19<H>  ----------------------------<  136<H>  4.5   |  22  |  0.99    Ca    8.8      18 Sep 2021 20:50  Phos  3.2     09-18  Mg     2.2     09-18    TPro  6.6  /  Alb  3.1<L>  /  TBili  0.5  /  DBili  x   /  AST  13  /  ALT  20  /  AlkPhos  72  09-18        Culture Results:   No growth (09-16 @ 13:25)  Culture Results:   No growth to date. (09-16 @ 10:32)  Culture Results:   No growth to date. (09-16 @ 10:32)      RADIOLOGY & ADDITIONAL STUDIES REVIEWED:  ***    [ ]GOALS OF CARE DISCUSSION WITH PATIENT/FAMILY/PROXY:    CRITICAL CARE TIME SPENT: 35 minutes

## 2021-09-19 NOTE — CHART NOTE - NSCHARTNOTEFT_GEN_A_CORE
<Hospital Course>  Patient is an 80F from home, lives with daughter, with PMHx of HTN, HLD, hypothyroidism, atrial fibrillation on Eliquis, recurrent hyponatremia, presenting with chief complaint of vomiting and tremors. Patient is AAOx2 at baseline. Patient was admitted for hyponatremia ~1month ago, thought to be due to hyper ADH. Due to chronic back pain, patient obtained a steroid shot on Sunday and was started on tramadol and baclofen. Day prior to admission, patient noted with severe headache and took 600mg IBUProfen and additionally, patient's blood pressure noted to be very labile, with episodes of hypertensive urgency and hypotension. This AM, patient noted with multiple episodes of vomiting and chills, and was subsequently brought to the hospital. In the ED, patient noted to have a sodium concentration of 116. 1L NS was given and sodium improved to 120, Pt was admitted to ICU for management of asymptomatic severe  hypovolemic hyponatremia 2/2 dehydration.     <ICU Course>  While in the ICU pt had increased urine output after receiving IV fluids in ED, causing correction of Na 116 to 124 in less than 24 hrs. She was given desmopressin to prevent free water diuresis and given D5W 500 cc bolus, followed by D5W at 120 cc/hr to prevent sodium concentration. Na levels improved to 121. Standing IVF were d/jordan. Started on Salt tablets x1day. Consulted Dr. Lomas (kat[place]) due to persistent episodes of hyponatremia, workup for adrenal insufficiency. As patient's sodium is now within normal limits and stable, patient to be downgraded to medicine for further management and evaluation of hyponatremia.       Patient is stable and transferred to the service of Dr. Chavez, Covering resident  ____ was informed.     <Things to follow>  - f/u patient's hypothyroidism and adjust levothyroxine accordingly to recommendations  - f/u AM serum cortisol levels. If serum cortisol <15, pt will need stimulation test- AM cortisol wnl  - Monitor BMP  - Zendejas catheter removed- ensure adequate voiding by 6PM 9/19    Hyponatremia  - Now corrected to 133  - patient had episodes of hyper-diuresis and rapid improvement of Na+, was given ddavp, hypotonic fluids.  - Suspected hypotonic hypovolemic hyponatremia from vomiting and poor oral intake  - Holding all IVF and monitor on oral diet  - dr. Cy Vang following  - Dr. Abebe Hickey was consulted to evaluate for adrenal insufficiency    Atrial Fibrillation  - Continued on propranolol and AC eliquis, no acute issues. <Hospital Course>  Patient is an 80F from home, lives with daughter, with PMHx of HTN, HLD, hypothyroidism, atrial fibrillation on Eliquis, recurrent hyponatremia, presenting with chief complaint of vomiting and tremors. Patient is AAOx2 at baseline. Patient was admitted for hyponatremia ~1month ago, thought to be due to hyper ADH. Due to chronic back pain, patient obtained a steroid shot on Sunday and was started on tramadol and baclofen. Day prior to admission, patient noted with severe headache and took 600mg IBUProfen and additionally, patient's blood pressure noted to be very labile, with episodes of hypertensive urgency and hypotension. This AM, patient noted with multiple episodes of vomiting and chills, and was subsequently brought to the hospital. In the ED, patient noted to have a sodium concentration of 116. 1L NS was given and sodium improved to 120, Pt was admitted to ICU for management of asymptomatic severe  hypovolemic hyponatremia 2/2 dehydration.     <ICU Course>  While in the ICU pt had increased urine output after receiving IV fluids in ED, causing correction of Na 116 to 124 in less than 24 hrs. She was given desmopressin to prevent free water diuresis and given D5W 500 cc bolus, followed by D5W at 120 cc/hr to prevent sodium concentration. Na levels improved to 121. Standing IVF were d/jordan. Started on Salt tablets x1day. Consulted Dr. Lomas ([place]) due to persistent episodes of hyponatremia, workup for adrenal insufficiency. As patient's sodium is now within normal limits and stable, patient to be downgraded to medicine for further management and evaluation of hyponatremia.       Patient is stable and transferred to the service of Dr. Chavez, Covering resident Dr. Santiago was informed.     <Things to follow>  - f/u patient's hypothyroidism and adjust levothyroxine accordingly to recommendations  - f/u AM serum cortisol levels. If serum cortisol <15, pt will need stimulation test- AM cortisol wnl  - Monitor BMP  - Zendejas catheter removed- ensure adequate voiding by 6PM 9/19    Hyponatremia  - Now corrected to 133  - patient had episodes of hyper-diuresis and rapid improvement of Na+, was given ddavp, hypotonic fluids.  - Suspected hypotonic hypovolemic hyponatremia from vomiting and poor oral intake  - Holding all IVF and monitor on oral diet  - dr. Cy Vang following  - [place], Dr. Lomas was consulted to evaluate for adrenal insufficiency    Atrial Fibrillation  - Continued on propranolol and AC eliquis, no acute issues.

## 2021-09-19 NOTE — PROGRESS NOTE ADULT - ASSESSMENT
80y Female with history of HTN, afib presents with vomiting and tremors. Nephrology consulted for hyponatremia.    1) Hyponatremia: Hypotonic hyponatremia. Repeat urine studies consistent with increased ADH state from nausea and vomiting. New urine studies from 9/18 no longer show SIADH physiology and likely this has resolved as nausea resolved.  Pt was given 1L NS in ER with overcorrection s/p DDAVP and D5 IVF to lower sodium appropriately. Repeat serum Na stable at 121 off IVF. Pt then started on NaCl 1g PO bid.  Sodium nathaly ~10mEq/12h, a bit faster than desired, but low likelihood of CPM in this context.  Monitor off NaCl tabs for now. Montior Na+ daily. Monitor urine o/p.   Elevated TSH- Endocrine following. Check AM cortisol. Repeat urine sodium, urine osmolality and serum osmolality again tomorrow am.  Today urine studies look a little more like poor solute intake, though not clear given pt has been on so many medications to modify serum/urine chemistry. Monitor serum sodium.    2) HTN: BP controlled. Continue with current anti-hypertensive medications. Monitor BP.    3) Nausea/ vomiting: resolved. c/w Zofran prn.     4) Hypothyroidism: Pt on Synthroid 50mcg PO qd. Endocrine following.       [facility] NEPHROLOGY  Manuel Clay M.D.  Mark Slaughter D.O.  Marj Benoit M.D.  Shamika Lopez, MSN, ANP-C    Telephone: [phone]  Facsimile: [phone]    [address] [place] [address]

## 2021-09-19 NOTE — PROGRESS NOTE ADULT - SUBJECTIVE AND OBJECTIVE BOX
Reason for Admission:  · Reason for Admission	Hyponatremia      · Subjective and Objective:   INTERVAL HPI/OVERNIGHT EVENTS: No adverse events overnight.     PRESSORS: [ ] YES [x] NO    Antimicrobial:    Cardiovascular:  amLODIPine   Tablet 5 milliGRAM(s) Oral daily  hydrALAZINE 25 milliGRAM(s) Oral two times a day  propranolol 10 milliGRAM(s) Oral two times a day    Pulmonary:    Hematalogic:  apixaban 5 milliGRAM(s) Oral every 12 hours    Other:  chlorhexidine 2% Cloths 1 Application(s) Topical <User Schedule>  latanoprost 0.005% Ophthalmic Solution 1 Drop(s) Both EYES at bedtime  levothyroxine 50 MICROGram(s) Oral daily  ondansetron Injectable 4 milliGRAM(s) IV Push every 8 hours PRN  polyethylene glycol 3350 17 Gram(s) Oral two times a day  senna 2 Tablet(s) Oral at bedtime  simvastatin 10 milliGRAM(s) Oral at bedtime  timolol 0.5% Solution 1 Drop(s) Both EYES two times a day    amLODIPine   Tablet 5 milliGRAM(s) Oral daily  apixaban 5 milliGRAM(s) Oral every 12 hours  chlorhexidine 2% Cloths 1 Application(s) Topical <User Schedule>  hydrALAZINE 25 milliGRAM(s) Oral two times a day  latanoprost 0.005% Ophthalmic Solution 1 Drop(s) Both EYES at bedtime  levothyroxine 50 MICROGram(s) Oral daily  ondansetron Injectable 4 milliGRAM(s) IV Push every 8 hours PRN  polyethylene glycol 3350 17 Gram(s) Oral two times a day  propranolol 10 milliGRAM(s) Oral two times a day  senna 2 Tablet(s) Oral at bedtime  simvastatin 10 milliGRAM(s) Oral at bedtime  timolol 0.5% Solution 1 Drop(s) Both EYES two times a day    Drug Dosing Weight  Height (cm): 154.9 (16 Sep 2021 05:00)  Weight (kg): 66.7 (16 Sep 2021 10:26)  BMI (kg/m2): 27.8 (16 Sep 2021 10:26)  BSA (m2): 1.66 (16 Sep 2021 10:26)    CENTRAL LINE: [ ] YES [x ] NO  LOCATION:   DATE INSERTED:  REMOVE: [ ] YES [ ] NO  EXPLAIN:    ARROYO: [x ] YES [ ] NO    DATE INSERTED: 9/16/21  REMOVE:  [ ] YES [ ] NO  EXPLAIN:    A-LINE:  [ ] YES [x] NO  LOCATION:   DATE INSERTED:  REMOVE:  [ ] YES [ ] NO  EXPLAIN:      ICU Vital Signs Last 24 Hrs  T(C): 36.4 (18 Sep 2021 23:31), Max: 36.6 (18 Sep 2021 16:17)  T(F): 97.6 (18 Sep 2021 23:31), Max: 97.9 (18 Sep 2021 16:17)  HR: 61 (19 Sep 2021 00:00) (51 - 68)  BP: 113/52 (19 Sep 2021 00:00) (83/46 - 121/63)  BP(mean): 67 (19 Sep 2021 00:00) (59 - 90)  ABP: --  ABP(mean): --  RR: 15 (19 Sep 2021 00:00) (12 - 25)  SpO2: 96% (19 Sep 2021 00:00) (95% - 100%)            09-17 @ 07:01  -  09-18 @ 07:00  --------------------------------------------------------  IN: 570 mL / OUT: 3285 mL / NET: -2715 mL    PHYSICAL EXAM:  GENERAL: NAD  EYES: EOMI, PERRLA  NECK: Supple, No JVD; Normal thyroid; Trachea midline: No LAD   NERVOUS SYSTEM:  Alert & Oriented X3,  Motor Strength 5/5 B/L upper and lower extremities; DTRs 2+ intact and symmetric  CHEST/LUNG: No rales, rhonchi, wheezing, breath sounds present bilaterally  HEART: Regular rate and rhythm; No murmurs, no gallops  ABDOMEN: Soft, Nontender, Nondistended; Bowel sounds present, no pain or masses on palpation  : Arroyo in place  EXTREMITIES:  2+ Peripheral Pulses, No clubbing, cyanosis, or edema  SKIN: warm, intact, no lesions       LABS:  CBC Full  -  ( 18 Sep 2021 04:36 )  WBC Count : 5.50 K/uL  RBC Count : 3.91 M/uL  Hemoglobin : 11.7 g/dL  Hematocrit : 33.9 %  Platelet Count - Automated : 366 K/uL  Mean Cell Volume : 86.7 fl  Mean Cell Hemoglobin : 29.9 pg  Mean Cell Hemoglobin Concentration : 34.5 gm/dL  Auto Neutrophil # : x  Auto Lymphocyte # : x  Auto Monocyte # : x  Auto Eosinophil # : x  Auto Basophil # : x  Auto Neutrophil % : x  Auto Lymphocyte % : x  Auto Monocyte % : x  Auto Eosinophil % : x  Auto Basophil % : x    09-18    130<L>  |  99  |  19<H>  ----------------------------<  136<H>  4.5   |  22  |  0.99    Ca    8.8      18 Sep 2021 20:50  Phos  3.2     09-18  Mg     2.2     09-18    TPro  6.6  /  Alb  3.1<L>  /  TBili  0.5  /  DBili  x   /  AST  13  /  ALT  20  /  AlkPhos  72  09-18        Culture Results:   No growth (09-16 @ 13:25)  Culture Results:   No growth to date. (09-16 @ 10:32)  Culture Results:   No growth to date. (09-16 @ 10:32)      RADIOLOGY & ADDITIONAL STUDIES REVIEWED:  ***    [ ]GOALS OF CARE DISCUSSION WITH PATIENT/FAMILY/PROXY:    CRITICAL CARE TIME SPENT: 35 minutes    Assessment and Plan:   · Assessment	  Pt 81 yo F from home w/ pmhx of afib on propanolol and xarelto, HTN, w/ CC of N & V. On ED found with Na 116. Admitted for asymptomatic hypovolemic hyponatremia 2/2 dehydration. Pt was recently in the ICU with similar chief complaint.       Plan:  Neuro:  - Appears to be at baseline mental status, AAOx3.  - Neurological exam unremarkable.    CV:  # HTN  -On amlodipine, hydralazine and propanolol at home  -Resumed home meds w/ parameters  -BP well controlled       #Paroxysmal Atrial Fibrillation  - On propanolol and eliquis at home.  - Rate controlled  - continue home meds    Pulm:  # No acute issues  - Saturating well on RA, no SOB, tachypnea. CXR clear    ID:  # No acute issues  - cxr unremarkable, UA clear    Nephro:  # Hyponatremia   - Decreased serum osm, appears dehydrated- likely hypotonic hypovolemic hyponatremia 2/2 extra renal losses  - Although urine Na increased, patient noted on diuretics.  - Yesterday pt with increased UO. Given desmopressin injection.  - s/p DW5 bolus and D5W 120 cc/hr   - Na has corrected from 116 (admission) to 121 today,   - d/jordan IV fluids  - Started on salt tablets and changed diet to regula  - CMP to be repeated today at 5 pm   - Nephro, Dr. Benoit consulted  - Endo Dr. Lomas consulted  - elevated TSH 6.88  - f/u free T4  - f/u 8am cortisol,   - If cortisol levels<15, pt will need to have stim test  - Nephro Dr. Knutson =  Will give NaCl tab 1g this am, but then stop NaCl tabs.  Repeat na+ Q8h.  If Na+>135, then start gentle D5W IVF hydration.  - Goal for 9/18 AM is 128~130  - Goal for 9/19 AM is 134~136    GI:  # Nausea  - asymptomatic at this time    Heme:  #Borderline anemia  - H/H stable  - Iron studies from prior admission noted.  - No indication for transfusion    Endo:  #Hypothyroidism  - Continue home dose levothyroxine  - Noted elevated TSH levels  -repeat TSH = 6.88  - f/u free T4       Prophy:  - Eliquis  - NO PPI as pt is eating    Dispo:  - Continue in ICU for further monitoring of hyponatremia.

## 2021-09-19 NOTE — PROGRESS NOTE ADULT - ASSESSMENT
Pt 81 yo F from home w/ pmhx of afib on propanolol and xarelto, HTN, w/ CC of N & V. On ED found with Na 116. Admitted for asymptomatic hypovolemic hyponatremia 2/2 dehydration. Pt was recently in the ICU with similar chief complaint.       Plan:  Neuro:  - Appears to be at baseline mental status, AAOx3.  - Neurological exam unremarkable.    CV:  # HTN  -On amlodipine, hydralazine and propanolol at home  -Resumed home meds w/ parameters  -BP well controlled       #Paroxysmal Atrial Fibrillation  - On propanolol and eliquis at home.  - Rate controlled  - continue home meds    Pulm:  # No acute issues  - Saturating well on RA, no SOB, tachypnea. CXR clear    ID:  # No acute issues  - cxr unremarkable, UA clear    Nephro:  # Hyponatremia   - Decreased serum osm, appears dehydrated- likely hypotonic hypovolemic hyponatremia 2/2 extra renal losses  - Although urine Na increased, patient noted on diuretics.  - Yesterday pt with increased UO. Given desmopressin injection.  - s/p DW5 bolus and D5W 120 cc/hr   - Na has corrected from 116 (admission) to 121 today,   - d/jordan IV fluids  - Started on salt tablets and changed diet to regula  - CMP to be repeated today at 5 pm   - Nephro, Dr. Benoit consulted  - Endo Dr. Lomas consulted  - elevated TSH 6.88  - f/u free T4  - f/u 8am cortisol,   - If cortisol levels<15, pt will need to have stim test  - Nephro Dr. Knutson =  Will give NaCl tab 1g this am, but then stop NaCl tabs.  Repeat na+ Q8h.  If Na+>135, then start gentle D5W IVF hydration.  - Goal for 9/18 AM is 128~130  - Goal for 9/19 AM is 134~136    GI:  # Nausea  - asymptomatic at this time    Heme:  #Borderline anemia  - H/H stable  - Iron studies from prior admission noted.  - No indication for transfusion    Endo:  #Hypothyroidism  - Continue home dose levothyroxine  - Noted elevated TSH levels  -repeat TSH = 6.88  - f/u free T4       Prophy:  - Eliquis  - NO PPI as pt is eating    Dispo:  - Continue in ICU for further monitoring of hyponatremia.     Pt 79 yo F from home w/ pmhx of afib on propanolol and xarelto, HTN, w/ CC of N & V. On ED found with Na 116. Admitted for asymptomatic hypovolemic hyponatremia 2/2 dehydration. Pt was recently in the ICU with similar chief complaint.       Plan:  Neuro:  - Appears to be at baseline mental status, AAOx3.  - Neurological exam unremarkable.    CV:  # HTN  -On amlodipine, hydralazine and propanolol at home  -Resumed home meds w/ parameters  -BP well controlled   - hydralazine held this AM      #Paroxysmal Atrial Fibrillation  - On propanolol and eliquis at home.  - Rate controlled  - continue home meds    Pulm:  # No acute issues  - Saturating well on RA, no SOB, tachypnea. CXR clear    ID:  # No acute issues  - cxr unremarkable, UA clear    Nephro:  # Hyponatremia   - Decreased serum osm, appears dehydrated- likely hypotonic hypovolemic hyponatremia 2/2 extra renal losses  - Nephro, Dr. Benoit consulted  - Briefly on salt tabs now discontinued.  - Patient's sodium now well control, no IVF no salt tabs, continue diet  - There are concerns for adrenal insufficiency provoking hyponatremia, hence Endocrinology was consulted for further workup. Initial evaluation with AM cortisol unremarkable.    GI:  # Nausea  - now improved, tolerating diet well    Heme:  #Borderline anemia  - H/H stable  - Iron studies from prior admission noted.  - No indication for transfusion    Endo:  #Hypothyroidism  - Continue home dose levothyroxine  - Noted elevated TSH levels  -repeat TSH = 6.88  - f/u free T4     #?Adrenal insuff  - Endo consulted for further workup.  - unlikely given initial AM cortisol wnl  -       Prophy:  - Eliquis  - NO PPI as pt is eating    Dispo:  - Downgrade to medicine for further care

## 2021-09-20 VITALS — SYSTOLIC BLOOD PRESSURE: 142 MMHG | HEART RATE: 70 BPM | DIASTOLIC BLOOD PRESSURE: 66 MMHG

## 2021-09-20 DIAGNOSIS — Z29.9 ENCOUNTER FOR PROPHYLACTIC MEASURES, UNSPECIFIED: ICD-10-CM

## 2021-09-20 DIAGNOSIS — I48.91 UNSPECIFIED ATRIAL FIBRILLATION: ICD-10-CM

## 2021-09-20 LAB
ALBUMIN SERPL ELPH-MCNC: 3.2 G/DL — LOW (ref 3.5–5)
ALP SERPL-CCNC: 84 U/L — SIGNIFICANT CHANGE UP (ref 40–120)
ALT FLD-CCNC: 21 U/L DA — SIGNIFICANT CHANGE UP (ref 10–60)
ANION GAP SERPL CALC-SCNC: 5 MMOL/L — SIGNIFICANT CHANGE UP (ref 5–17)
AST SERPL-CCNC: 10 U/L — SIGNIFICANT CHANGE UP (ref 10–40)
BILIRUB SERPL-MCNC: 0.4 MG/DL — SIGNIFICANT CHANGE UP (ref 0.2–1.2)
BUN SERPL-MCNC: 18 MG/DL — SIGNIFICANT CHANGE UP (ref 7–18)
CALCIUM SERPL-MCNC: 9.5 MG/DL — SIGNIFICANT CHANGE UP (ref 8.4–10.5)
CHLORIDE SERPL-SCNC: 98 MMOL/L — SIGNIFICANT CHANGE UP (ref 96–108)
CHLORIDE UR-SCNC: 20 MMOL/L — SIGNIFICANT CHANGE UP
CO2 SERPL-SCNC: 30 MMOL/L — SIGNIFICANT CHANGE UP (ref 22–31)
CREAT SERPL-MCNC: 0.79 MG/DL — SIGNIFICANT CHANGE UP (ref 0.5–1.3)
GLUCOSE SERPL-MCNC: 100 MG/DL — HIGH (ref 70–99)
HCT VFR BLD CALC: 35.3 % — SIGNIFICANT CHANGE UP (ref 34.5–45)
HGB BLD-MCNC: 11.7 G/DL — SIGNIFICANT CHANGE UP (ref 11.5–15.5)
MAGNESIUM SERPL-MCNC: 2.4 MG/DL — SIGNIFICANT CHANGE UP (ref 1.6–2.6)
MCHC RBC-ENTMCNC: 30.1 PG — SIGNIFICANT CHANGE UP (ref 27–34)
MCHC RBC-ENTMCNC: 33.1 GM/DL — SIGNIFICANT CHANGE UP (ref 32–36)
MCV RBC AUTO: 90.7 FL — SIGNIFICANT CHANGE UP (ref 80–100)
NRBC # BLD: 0 /100 WBCS — SIGNIFICANT CHANGE UP (ref 0–0)
OSMOLALITY UR: 335 MOS/KG — SIGNIFICANT CHANGE UP (ref 50–1200)
PHOSPHATE SERPL-MCNC: 3.8 MG/DL — SIGNIFICANT CHANGE UP (ref 2.5–4.5)
PLATELET # BLD AUTO: 371 K/UL — SIGNIFICANT CHANGE UP (ref 150–400)
POTASSIUM SERPL-MCNC: 4.8 MMOL/L — SIGNIFICANT CHANGE UP (ref 3.5–5.3)
POTASSIUM SERPL-SCNC: 4.8 MMOL/L — SIGNIFICANT CHANGE UP (ref 3.5–5.3)
PROT SERPL-MCNC: 6.8 G/DL — SIGNIFICANT CHANGE UP (ref 6–8.3)
RBC # BLD: 3.89 M/UL — SIGNIFICANT CHANGE UP (ref 3.8–5.2)
RBC # FLD: 14.2 % — SIGNIFICANT CHANGE UP (ref 10.3–14.5)
SODIUM SERPL-SCNC: 133 MMOL/L — LOW (ref 135–145)
SODIUM UR-SCNC: 14 MMOL/L — SIGNIFICANT CHANGE UP
WBC # BLD: 7.42 K/UL — SIGNIFICANT CHANGE UP (ref 3.8–10.5)
WBC # FLD AUTO: 7.42 K/UL — SIGNIFICANT CHANGE UP (ref 3.8–10.5)

## 2021-09-20 PROCEDURE — 82962 GLUCOSE BLOOD TEST: CPT

## 2021-09-20 PROCEDURE — 96374 THER/PROPH/DIAG INJ IV PUSH: CPT

## 2021-09-20 PROCEDURE — 87040 BLOOD CULTURE FOR BACTERIA: CPT

## 2021-09-20 PROCEDURE — 81003 URINALYSIS AUTO W/O SCOPE: CPT

## 2021-09-20 PROCEDURE — 84484 ASSAY OF TROPONIN QUANT: CPT

## 2021-09-20 PROCEDURE — 84156 ASSAY OF PROTEIN URINE: CPT

## 2021-09-20 PROCEDURE — 83735 ASSAY OF MAGNESIUM: CPT

## 2021-09-20 PROCEDURE — 71045 X-RAY EXAM CHEST 1 VIEW: CPT

## 2021-09-20 PROCEDURE — 84443 ASSAY THYROID STIM HORMONE: CPT

## 2021-09-20 PROCEDURE — 87640 STAPH A DNA AMP PROBE: CPT

## 2021-09-20 PROCEDURE — 80048 BASIC METABOLIC PNL TOTAL CA: CPT

## 2021-09-20 PROCEDURE — 83880 ASSAY OF NATRIURETIC PEPTIDE: CPT

## 2021-09-20 PROCEDURE — 82436 ASSAY OF URINE CHLORIDE: CPT

## 2021-09-20 PROCEDURE — 84560 ASSAY OF URINE/URIC ACID: CPT

## 2021-09-20 PROCEDURE — 87086 URINE CULTURE/COLONY COUNT: CPT

## 2021-09-20 PROCEDURE — 93005 ELECTROCARDIOGRAM TRACING: CPT

## 2021-09-20 PROCEDURE — 84300 ASSAY OF URINE SODIUM: CPT

## 2021-09-20 PROCEDURE — 36415 COLL VENOUS BLD VENIPUNCTURE: CPT

## 2021-09-20 PROCEDURE — 84439 ASSAY OF FREE THYROXINE: CPT

## 2021-09-20 PROCEDURE — 86769 SARS-COV-2 COVID-19 ANTIBODY: CPT

## 2021-09-20 PROCEDURE — 83605 ASSAY OF LACTIC ACID: CPT

## 2021-09-20 PROCEDURE — 82533 TOTAL CORTISOL: CPT

## 2021-09-20 PROCEDURE — 85027 COMPLETE CBC AUTOMATED: CPT

## 2021-09-20 PROCEDURE — 83935 ASSAY OF URINE OSMOLALITY: CPT

## 2021-09-20 PROCEDURE — 82570 ASSAY OF URINE CREATININE: CPT

## 2021-09-20 PROCEDURE — 99285 EMERGENCY DEPT VISIT HI MDM: CPT

## 2021-09-20 PROCEDURE — 80053 COMPREHEN METABOLIC PANEL: CPT

## 2021-09-20 PROCEDURE — 84100 ASSAY OF PHOSPHORUS: CPT

## 2021-09-20 PROCEDURE — 87641 MR-STAPH DNA AMP PROBE: CPT

## 2021-09-20 PROCEDURE — 83930 ASSAY OF BLOOD OSMOLALITY: CPT

## 2021-09-20 PROCEDURE — 85025 COMPLETE CBC W/AUTO DIFF WBC: CPT

## 2021-09-20 PROCEDURE — 87635 SARS-COV-2 COVID-19 AMP PRB: CPT

## 2021-09-20 RX ORDER — AMIODARONE HYDROCHLORIDE 400 MG/1
0.5 TABLET ORAL
Qty: 0 | Refills: 0 | DISCHARGE

## 2021-09-20 RX ADMIN — Medication 25 MILLIGRAM(S): at 17:25

## 2021-09-20 RX ADMIN — Medication 25 MILLIGRAM(S): at 06:52

## 2021-09-20 RX ADMIN — Medication 50 MICROGRAM(S): at 06:52

## 2021-09-20 RX ADMIN — Medication 1 DROP(S): at 17:25

## 2021-09-20 RX ADMIN — APIXABAN 5 MILLIGRAM(S): 2.5 TABLET, FILM COATED ORAL at 06:51

## 2021-09-20 RX ADMIN — Medication 1 DROP(S): at 06:52

## 2021-09-20 RX ADMIN — POLYETHYLENE GLYCOL 3350 17 GRAM(S): 17 POWDER, FOR SOLUTION ORAL at 06:51

## 2021-09-20 RX ADMIN — AMLODIPINE BESYLATE 5 MILLIGRAM(S): 2.5 TABLET ORAL at 06:51

## 2021-09-20 RX ADMIN — POLYETHYLENE GLYCOL 3350 17 GRAM(S): 17 POWDER, FOR SOLUTION ORAL at 17:25

## 2021-09-20 RX ADMIN — APIXABAN 5 MILLIGRAM(S): 2.5 TABLET, FILM COATED ORAL at 17:25

## 2021-09-20 NOTE — DISCHARGE NOTE PROVIDER - CARE PROVIDER_API CALL
Manuel Clay)  Internal Medicine; Nephrology  [address] [place] [address]  Phone: [phone]  Fax: [phone]  Follow Up Time:    Manuel Clay)  Internal Medicine; Nephrology  [address] [place] [address]  Phone: [phone]  Fax: [phone]  Follow Up Time:     Marj Benoit)  Internal Medicine  [address] [place] [address]  Phone: [phone]  Fax: [phone]  Follow Up Time:

## 2021-09-20 NOTE — PROGRESS NOTE ADULT - PROBLEM SELECTOR PLAN 1
- Decreased serum osm, appears dehydrated- likely hypotonic hypovolemic hyponatremia 2/2 extra renal losses  - Although urine Na increased, patient noted on diuretics.  - Yesterday pt with increased UO. Given desmopressin injection.  - s/p DW5 bolus and D5W 120 cc/hr   - Na has corrected from 116 (admission) to 121 today,   - d/jordan IV fluids  - Started on salt tablets and changed diet to regula  - Nephro, Dr. Benoit consulted  - Endo Dr. Lomas consulted  - elevated TSH 6.88  - f/u free T4  - f/u 8am cortisol, 17.4  - Nephro Dr. Knutson =  stop NaCl tabs.  Repeat na+ Q8h.  If Na+>135, then start gentle D5W IVF hydration.  - Goal for 9/18 AM is 128~130  - Goal for 9/19 AM is 134~136

## 2021-09-20 NOTE — DISCHARGE NOTE PROVIDER - PROVIDER TOKENS
PROVIDER:[TOKEN:[[id number]]] PROVIDER:[TOKEN:[[id number]]],PROVIDER:[TOKEN:[[id number]]]

## 2021-09-20 NOTE — DISCHARGE NOTE PROVIDER - NSDCCPCAREPLAN_GEN_ALL_CORE_FT
PRINCIPAL DISCHARGE DIAGNOSIS  Diagnosis: Hyponatremia  Assessment and Plan of Treatment: You presentyed with vomiting and tremors. In the ED, You were  noted to have a sodium concentration of 116. 1L NS was given and sodium improved to 120, You were admitted to ICU for management of asymptomatic severe  hypovolemic hyponatremia secondary to  dehydration.   While in the ICU You  had increased urine output after receiving IV fluids in ED, causing correction of Na 116 to 124 in less than 24 hrs. You were then   given desmopressin to prevent free water diuresis and given D5W 500 cc bolus, followed by D5W at 120 cc/hr to prevent sodium concentration. Na levels improved to 121. Standing IVF were discontinued, You were  Started on Salt tablets for 1day. Dr. Lomas ([facility]) evaluated you due to persistent episodes of hyponatremia, workup done and ruled out adrenal insufficiency . As Your  sodium is now within normal limits and stable, You  got downgraded to medicine . You were evaluated by nephrologist and sodium was better controlled . You are recommended to  follow up with Dr Manuel Clay M.D. as outpatient .         SECONDARY DISCHARGE DIAGNOSES  Diagnosis: Hypothyroidism  Assessment and Plan of Treatment: You have past history of hypothyroidism.   Continue with home medication and follow up with your PCP      Diagnosis: Atrial fibrillation  Assessment and Plan of Treatment: You have past history of A fib    Continue with home medication and follow up with your PCP    Diagnosis: HTN (hypertension)  Assessment and Plan of Treatment: Blood Pressure Control , Please continue current medication regimen, and follow up with your PCP  - You have a history of Hypertension.   - You should continue on the current antihypertensive regimen regularly.  - You blood pressure should be within 140-120/80-90.  - You should follow-up with your PCP within 1 week of your discharge for routine blood pressure monitoring at your next visit.  - Notify your doctor if you have any of the following symptoms:   (Dizziness, Lightheadedness, Blurry vision, Headache, Chest pain, Shortness of breath.)  - You should maintain healthy lifestyle by eating healthy low salt diet, avoid fatty food, weight loss, exercise regularly as tolerated 30 mins X 3 time per week.       PRINCIPAL DISCHARGE DIAGNOSIS  Diagnosis: Hyponatremia  Assessment and Plan of Treatment: You presentyed with vomiting and tremors. In the ED, You were  noted to have a sodium concentration of 116. 1L NS was given and sodium improved to 120, You were admitted to ICU for management of asymptomatic severe  hypovolemic hyponatremia secondary to  dehydration.   While in the ICU You  had increased urine output after receiving IV fluids in ED, causing correction of Na 116 to 124 in less than 24 hrs. You were then   given desmopressin to prevent free water diuresis and given D5W 500 cc bolus, followed by D5W at 120 cc/hr to prevent sodium concentration. Na levels improved to 121. Standing IVF were discontinued, You were  Started on Salt tablets for 1day. Dr. Loams ([facility]) evaluated you due to persistent episodes of hyponatremia, workup done and ruled out adrenal insufficiency . As Your  sodium is now within normal limits and stable, You  got downgraded to medicine . You were evaluated by nephrologist and sodium was better controlled . You are recommended to  follow up with Dr Manuel Clay M.D. or Dr Benoit as outpatient for repeat of BMP         SECONDARY DISCHARGE DIAGNOSES  Diagnosis: Hypothyroidism  Assessment and Plan of Treatment: You have past history of hypothyroidism.   Continue with home medication and follow up with your PCP      Diagnosis: Atrial fibrillation  Assessment and Plan of Treatment: You have past history of A fib    Continue with home medication and follow up with your PCP    Diagnosis: HTN (hypertension)  Assessment and Plan of Treatment: Blood Pressure Control , Please continue current medication regimen, and follow up with your PCP  - You have a history of Hypertension.   - You should continue on the current antihypertensive regimen regularly.  - You blood pressure should be within 140-120/80-90.  - You should follow-up with your PCP within 1 week of your discharge for routine blood pressure monitoring at your next visit.  - Notify your doctor if you have any of the following symptoms:   (Dizziness, Lightheadedness, Blurry vision, Headache, Chest pain, Shortness of breath.)  - You should maintain healthy lifestyle by eating healthy low salt diet, avoid fatty food, weight loss, exercise regularly as tolerated 30 mins X 3 time per week.

## 2021-09-20 NOTE — PROGRESS NOTE ADULT - PROBLEM SELECTOR PLAN 2
- Continue home dose levothyroxine  - Noted elevated TSH levels  -repeat TSH = 6.88  - f/u free T4 1.5

## 2021-09-20 NOTE — DISCHARGE NOTE NURSING/CASE MANAGEMENT/SOCIAL WORK - PATIENT PORTAL LINK FT
You can access the FollowMyHealth Patient Portal offered by [facility] by registering at the following website: [email or web address]. By joining [employer]’s FollowMyHealth portal, you will also be able to view your health information using other applications (apps) compatible with our system.

## 2021-09-20 NOTE — PROGRESS NOTE ADULT - ASSESSMENT
80y Female with history of HTN, afib presents with vomiting and tremors. Nephrology consulted for hyponatremia.    1) Hyponatremia: Hypotonic hyponatremia. Urine studies consistent with increased ADH state from nausea and vomiting. New urine studies from 9/18 no longer show SIADH physiology and likely this has resolved as nausea resolved.  urine studies look a little more like poor solute intake, Will monitor off salt tabs. Encourage PO intake; will add Ensure 1 can bid.   Elevated TSH- Endocrine following. Check AM cortisol  Monitor serum sodium.    2) HTN: BP controlled. Continue with current anti-hypertensive medications. Monitor BP.    3) Nausea/ vomiting: resolved. c/w Zofran prn.     4) Hypothyroidism: Pt on Synthroid 50mcg PO qd. Endocrine following.     Ok from renal standpoint for d/c home today; Advise to f/u with me in the office in 1 week for repeat BMP.       [facility] NEPHROLOGY  Manuel Clay M.D.  Mark Slaughter D.O.  Marj Benoit M.D.  Shamika Lopez, MSN, ANP-C    Telephone: [phone]  Facsimile: [phone]    [address] [place] [address]

## 2021-09-20 NOTE — DISCHARGE NOTE PROVIDER - NSDCMRMEDTOKEN_GEN_ALL_CORE_FT
amiodarone 200 mg oral tablet: 0.5 tab(s) orally 2 times a day  amLODIPine 5 mg oral tablet: 1 tab(s) orally once a day  baclofen 10 mg oral tablet: 1 tab(s) orally 3 times a day, As Needed  Eliquis 5 mg oral tablet: 1 tab(s) orally 2 times a day  hydrALAZINE 25 mg oral tablet: 1 tab(s) orally 2 times a day  latanoprost 0.005% ophthalmic solution: 1 drop(s) to each affected eye once a day (in the evening)  levothyroxine 50 mcg (0.05 mg) oral tablet: 1 tab(s) orally once a day  propranolol 10 mg oral tablet: 1 tab(s) orally 2 times a day  simvastatin 10 mg oral tablet: 1 tab(s) orally once a day (at bedtime)  timolol hemihydrate 0.5% ophthalmic solution: 1 drop(s) to each affected eye 2 times a day  traMADol 50 mg oral tablet: 1 tab(s) orally every 6 hours, As Needed   amLODIPine 5 mg oral tablet: 1 tab(s) orally once a day  baclofen 10 mg oral tablet: 1 tab(s) orally 3 times a day, As Needed  Eliquis 5 mg oral tablet: 1 tab(s) orally 2 times a day  hydrALAZINE 25 mg oral tablet: 1 tab(s) orally 2 times a day  latanoprost 0.005% ophthalmic solution: 1 drop(s) to each affected eye once a day (in the evening)  levothyroxine 50 mcg (0.05 mg) oral tablet: 1 tab(s) orally once a day  propranolol 10 mg oral tablet: 1 tab(s) orally 2 times a day  simvastatin 10 mg oral tablet: 1 tab(s) orally once a day (at bedtime)  timolol hemihydrate 0.5% ophthalmic solution: 1 drop(s) to each affected eye 2 times a day  traMADol 50 mg oral tablet: 1 tab(s) orally every 6 hours, As Needed

## 2021-09-20 NOTE — PROGRESS NOTE ADULT - PROVIDER SPECIALTY LIST ADULT
Critical Care
Endocrinology
Internal Medicine
Nephrology
Critical Care
Critical Care
Endocrinology
Internal Medicine
Nephrology
Internal Medicine

## 2021-09-20 NOTE — DISCHARGE NOTE PROVIDER - HOSPITAL COURSE
Patient is an 80F from home, lives with daughter, with PMHx of HTN, HLD, hypothyroidism, atrial fibrillation on Eliquis, recurrent hyponatremia, presenting with chief complaint of vomiting and tremors. Patient is AAOx2 at baseline. Patient was admitted for hyponatremia ~1month ago, thought to be due to hyper ADH. Due to chronic back pain, patient obtained a steroid shot on Sunday and was started on tramadol and baclofen. Day prior to admission, patient noted with severe headache and took 600mg IBUProfen and additionally, patient's blood pressure noted to be very labile, with episodes of hypertensive urgency and hypotension. , patient noted with multiple episodes of vomiting and chills, and was subsequently brought to the hospital. In the ED, patient noted to have a sodium concentration of 116. 1L NS was given and sodium improved to 120, Pt was admitted to ICU for management of asymptomatic severe  hypovolemic hyponatremia 2/2 dehydration.     <ICU Course>  While in the ICU pt had increased urine output after receiving IV fluids in ED, causing correction of Na 116 to 124 in less than 24 hrs. She was given desmopressin to prevent free water diuresis and given D5W 500 cc bolus, followed by D5W at 120 cc/hr to prevent sodium concentration. Na levels improved to 121. Standing IVF were d/jordan. Started on Salt tablets x1day. Consulted Dr. Lomas ([facility]) due to persistent episodes of hyponatremia, workup done and ruled out adrenal insufficiency . As patient's sodium is now within normal limits and stable, patient got downgraded to medicine . Patient was evaluated by nephrologist and sodium was better controlled . patient is to  follow up with Dr Manuel Clay M.D. as outpatient .

## 2021-09-20 NOTE — PROGRESS NOTE ADULT - SUBJECTIVE AND OBJECTIVE BOX
PGY-1 Progress Note discussed with attending    CHIEF COMPLAINT & BRIEF HOSPITAL COURSE:  Pt 79 yo F from home w/ pmhx of afib on propanolol and xarelto, HTN, w/ CC of N & V. On ED found with Na 116. Admitted for asymptomatic hypovolemic hyponatremia 2/2 dehydration    INTERVAL HPI/OVERNIGHT EVENTS:   No overnight events or acute complaints noted    REVIEW OF SYSTEMS:  CONSTITUTIONAL: No fever, weight loss, or fatigue  RESPIRATORY: No cough, wheezing, chills or hemoptysis; No shortness of breath  CARDIOVASCULAR: No chest pain, palpitations, dizziness, or leg swelling  GASTROINTESTINAL: No abdominal pain. No nausea, vomiting, or hematemesis; No diarrhea or constipation. No melena or hematochezia.  GENITOURINARY: No dysuria or hematuria, urinary frequency  NEUROLOGICAL: No headaches, memory loss, loss of strength, numbness, or tremors  SKIN: No itching, burning, rashes, or lesions     MEDICATIONS  (STANDING):  amLODIPine   Tablet 5 milliGRAM(s) Oral daily  apixaban 5 milliGRAM(s) Oral every 12 hours  hydrALAZINE 25 milliGRAM(s) Oral two times a day  latanoprost 0.005% Ophthalmic Solution 1 Drop(s) Both EYES at bedtime  levothyroxine 50 MICROGram(s) Oral daily  polyethylene glycol 3350 17 Gram(s) Oral two times a day  propranolol 10 milliGRAM(s) Oral two times a day  senna 2 Tablet(s) Oral at bedtime  simvastatin 10 milliGRAM(s) Oral at bedtime  timolol 0.5% Solution 1 Drop(s) Both EYES two times a day    MEDICATIONS  (PRN):  ondansetron Injectable 4 milliGRAM(s) IV Push every 8 hours PRN Nausea and/or Vomiting      Vital Signs Last 24 Hrs  T(C): 36.8 (20 Sep 2021 13:08), Max: 36.8 (20 Sep 2021 13:08)  T(F): 98.3 (20 Sep 2021 13:08), Max: 98.3 (20 Sep 2021 13:08)  HR: 64 (20 Sep 2021 13:08) (62 - 78)  BP: 117/72 (20 Sep 2021 13:08) (104/56 - 135/64)  BP(mean): --  RR: 16 (20 Sep 2021 13:08) (16 - 18)  SpO2: 96% (20 Sep 2021 13:08) (96% - 98%)    PHYSICAL EXAMINATION:  GENERAL: NAD  EYES: EOMI, PERRLA  NECK: Supple, No JVD; Normal thyroid; Trachea midline: No LAD   NERVOUS SYSTEM:  Alert & Oriented X3,  Motor Strength 5/5 B/L upper and lower extremities; DTRs 2+ intact and symmetric  CHEST/LUNG: No rales, rhonchi, wheezing, breath sounds present bilaterally  HEART: Regular rate and rhythm; No murmurs, no gallops  ABDOMEN: Soft, Nontender, Nondistended; Bowel sounds present, no pain or masses on palpation  : Zendejas in place  EXTREMITIES:  2+ Peripheral Pulses, No clubbing, cyanosis, or edema  SKIN: warm, intact, no lesions                           11.7   7.42  )-----------( 371      ( 20 Sep 2021 07:29 )             35.3     09-20    133<L>  |  98  |  18  ----------------------------<  100<H>  4.8   |  30  |  0.79    Ca    9.5      20 Sep 2021 07:29  Phos  3.8     09-20  Mg     2.4     09-20    TPro  6.8  /  Alb  3.2<L>  /  TBili  0.4  /  DBili  x   /  AST  10  /  ALT  21  /  AlkPhos  84  09-20    LIVER FUNCTIONS - ( 20 Sep 2021 07:29 )  Alb: 3.2 g/dL / Pro: 6.8 g/dL / ALK PHOS: 84 U/L / ALT: 21 U/L DA / AST: 10 U/L / GGT: x                   I&O's Summary    19 Sep 2021 07:01  -  20 Sep 2021 07:00  --------------------------------------------------------  IN: 0 mL / OUT: 225 mL / NET: -225 mL            CAPILLARY BLOOD GLUCOSE      RADIOLOGY & ADDITIONAL TESTS:

## 2021-09-20 NOTE — PROGRESS NOTE ADULT - ASSESSMENT
Pt 79 yo F from home w/ pmhx of afib on propanolol and xarelto, HTN, w/ CC of N & V. On ED found with Na 116. Admitted for asymptomatic hypovolemic hyponatremia 2/2 dehydration

## 2021-09-20 NOTE — PROGRESS NOTE ADULT - SUBJECTIVE AND OBJECTIVE BOX
[facility] NEPHROLOGY- PROGRESS NOTE    80y Eritrean speaking  Female with history of HTN, afib presents with vomiting and tremors. Serum Na 116 in ER. Nephrology consulted for hyponatremia.    Hospital Medications: Medications reviewed.  REVIEW OF SYSTEMS:  CONSTITUTIONAL: No fevers or chills  RESPIRATORY: No shortness of breath  CARDIOVASCULAR: No chest pain.  GASTROINTESTINAL: No nausea, vomiting, or diarrhea. or abdominal pain.   VASCULAR: No bilateral lower extremity edema.  +Rt hip pain    VITALS:  T(F): 98.3 (21 @ 13:08), Max: 98.3 (21 @ 13:08)  HR: 64 (21 @ 13:08)  BP: 117/72 (21 @ 13:08)  RR: 16 (21 @ 13:08)  SpO2: 96% (21 @ 13:08)  Wt(kg): --     @ 07:01  -   @ 07:00  --------------------------------------------------------  IN: 0 mL / OUT: 225 mL / NET: -225 mL          PHYSICAL EXAM:  Constitutional: NAD  Neurological: Awake and Alert  HEENT: anicteric sclera,   Respiratory: CTAB, no wheezes, rales or rhonchi  Cardiovascular: S1, S2, RRR  Gastrointestinal: BS+, soft, NT/ND  : No ventura.  Extremities: No peripheral edema      LABS:      133<L>  |  98  |  18  ----------------------------<  100<H>  4.8   |  30  |  0.79    Ca    9.5      20 Sep 2021 07:29  Phos  3.8       Mg     2.4         TPro  6.8  /  Alb  3.2<L>  /  TBili  0.4  /  DBili      /  AST  10  /  ALT  21  /  AlkPhos  84      Creatinine Trend: 0.79 <--, 0.81 <--, 0.99 <--, 0.77 <--, 0.66 <--, 0.65 <--, 0.78 <--, 0.70 <--, 0.60 <--, 0.65 <--, 0.79 <--, 0.66 <--, 0.52 <--, 0.50 <--, 0.58 <--                        11.7   7.42  )-----------( 371      ( 20 Sep 2021 07:29 )             35.3     Urine Studies:  Urinalysis Basic - ( 16 Sep 2021 08:21 )    Color: Yellow / Appearance: Clear / S.010 / pH:   Gluc:  / Ketone: Trace  / Bili: Negative / Urobili: Negative   Blood:  / Protein: Negative / Nitrite: Negative   Leuk Esterase: Negative / RBC:  / WBC    Sq Epi:  / Non Sq Epi:  / Bacteria:       Sodium, Random Urine: 24 mmol/L ( @ 08:39)  Osmolality, Random Urine: 175 mos/kg ( @ 08:39)  Chloride, Random Urine: 29 mmol/L ( @ 08:39)  Osmolality, Random Urine: 291 mos/kg ( @ 03:52)  Sodium, Random Urine: 54 mmol/L ( @ 03:52)  Sodium, Random Urine: 37 mmol/L ( @ 14:43)  Osmolality, Random Urine: 365 mos/kg ( @ 14:42)  Osmolality, Random Urine: 213 mos/kg ( @ 08:21)  Sodium, Random Urine: 56 mmol/L ( @ 08:21)  Creatinine, Random Urine: <13 mg/dL ( @ 08:21)

## 2021-10-11 NOTE — ED ADULT NURSE NOTE - DOES PATIENT HAVE ADVANCE DIRECTIVE
[FreeTextEntry1] : CBC done  10/7/2021:\par wbc- 9.9\par hgb- 14.2\par hct - 43.3\par plt-218.0\par alc- 1.70\par anc- 7.37\par \par \par \par \par Final Diagnosis\par 1.  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 Additional studies, including molecular\par and cytogenetics/FISH are performed and will be reported in a\par comprehensive addendum.\par \par Dr. Orozco was urgently faxed report on 8/2/21.\par \par Microscopic description:   The histologic sections of the right\par neck lymph node biopsy shows fragmented lymphoid tissue with\par areas of increased atypical larger cells. The larger cells show\par increased nuclear to cytoplasmic ratio, irregular nuclear\par contours and some with prominent nucleoli. No necrosis is present\par and mitoses/apoptosis are frequent.\par \par Immunohistochemical stains for CD3, CD5, CD20, PAX5, CD10, BCL6,\par BCL2, CyclinD1, Ki67, CD23, CD21, CD43, CD79a, MUM1 stains\par performed on formalin fixed paraffin embedded tissue, block 1A.\par \par Neoplastic cells are:  the larger atypical cells\par Positive:  CD20, PAX-5, BCL-6, MUM1, BCL2, p53 and some scattered\par CD30 positive cells, KI-67% is high within the areas of the\par larger cells (70-80%), c-MYC\par Negative: CD3, CD5, CD10, CD23 and CD21 (in the areas of the\par larger cells), ROSE\par Other:  CD3 and CD5 are positive in T-cells\par \par Flow cytometry: Monotypic B-cells (33% of cells), positive for\par kappa, CD19, CD20, FMC-7; negative CD5, CD10, CD23. The findings\par are consistent with CD5 negative, CD10 negative B-cell\par lymphoproliferative disorder/lymphoma.\par \par Cytogenetics/FISH: pending\par \par \par \par \par \par \par KOKO GARCIA BAO                 3\par \par \par \par Surgical Final Report\par \par \par \par \par Molecular studies: pending\par \par Verified by: Jennifer Gorman MD\par (Electronic Signature)\par Reported on: 08/02/21 12:38 EDT, [address],\[place] [address]\par Phone: [phone]   Fax: [phone]\par _________________________________________________________________\par \par PET/CT 8/12/21\par IMPRESSION: Abnormal FDG-PET/CT scan.\par \par 1. FDG-avid right cervical lymphadenopathy corresponds to biopsy-proven lymphoma.\par \par 2. Small FDG-avid perigastric lymph node is indeterminate, possibly lymphoma.\par \par --- End of Report ---\par 
No

## 2022-12-21 NOTE — ED PROVIDER NOTE - CPE EDP CARDIAC NORM
Detail Level: Zone
Render In Strict Bullet Format?: No
Plan: Continue TMC 0.1 BID 2 weeks on 2 weeks off\\nMoisturize with vaseline\\nSwitch to dove soap, using dish soap for hand washing
normal...

## 2023-09-07 ENCOUNTER — INPATIENT (INPATIENT)
Facility: HOSPITAL | Age: 83
LOS: 2 days | Discharge: HOME | DRG: 644 | End: 2023-09-10
Attending: INTERNAL MEDICINE | Admitting: INTERNAL MEDICINE
Payer: MEDICARE

## 2023-09-07 VITALS
DIASTOLIC BLOOD PRESSURE: 78 MMHG | TEMPERATURE: 98 F | OXYGEN SATURATION: 96 % | RESPIRATION RATE: 18 BRPM | HEART RATE: 72 BPM | SYSTOLIC BLOOD PRESSURE: 168 MMHG | WEIGHT: 156.97 LBS

## 2023-09-07 DIAGNOSIS — E87.1 HYPO-OSMOLALITY AND HYPONATREMIA: ICD-10-CM

## 2023-09-07 DIAGNOSIS — I10 ESSENTIAL (PRIMARY) HYPERTENSION: ICD-10-CM

## 2023-09-07 DIAGNOSIS — Z29.9 ENCOUNTER FOR PROPHYLACTIC MEASURES, UNSPECIFIED: ICD-10-CM

## 2023-09-07 DIAGNOSIS — H40.9 UNSPECIFIED GLAUCOMA: ICD-10-CM

## 2023-09-07 DIAGNOSIS — E03.9 HYPOTHYROIDISM, UNSPECIFIED: ICD-10-CM

## 2023-09-07 DIAGNOSIS — I48.20 CHRONIC ATRIAL FIBRILLATION, UNSPECIFIED: ICD-10-CM

## 2023-09-07 LAB
ALBUMIN SERPL ELPH-MCNC: 3.5 G/DL — SIGNIFICANT CHANGE UP (ref 3.5–5)
ALP SERPL-CCNC: 60 U/L — SIGNIFICANT CHANGE UP (ref 40–120)
ALT FLD-CCNC: 20 U/L DA — SIGNIFICANT CHANGE UP (ref 10–60)
ANION GAP SERPL CALC-SCNC: 11 MMOL/L — SIGNIFICANT CHANGE UP (ref 5–17)
ANION GAP SERPL CALC-SCNC: 7 MMOL/L — SIGNIFICANT CHANGE UP (ref 5–17)
ANION GAP SERPL CALC-SCNC: 7 MMOL/L — SIGNIFICANT CHANGE UP (ref 5–17)
APPEARANCE UR: ABNORMAL
APPEARANCE UR: CLEAR — SIGNIFICANT CHANGE UP
AST SERPL-CCNC: 32 U/L — SIGNIFICANT CHANGE UP (ref 10–40)
BACTERIA # UR AUTO: ABNORMAL /HPF
BASOPHILS # BLD AUTO: 0.02 K/UL — SIGNIFICANT CHANGE UP (ref 0–0.2)
BASOPHILS NFR BLD AUTO: 0.4 % — SIGNIFICANT CHANGE UP (ref 0–2)
BILIRUB SERPL-MCNC: 0.8 MG/DL — SIGNIFICANT CHANGE UP (ref 0.2–1.2)
BILIRUB UR-MCNC: NEGATIVE — SIGNIFICANT CHANGE UP
BILIRUB UR-MCNC: NEGATIVE — SIGNIFICANT CHANGE UP
BUN SERPL-MCNC: 7 MG/DL — SIGNIFICANT CHANGE UP (ref 7–18)
BUN SERPL-MCNC: 8 MG/DL — SIGNIFICANT CHANGE UP (ref 7–18)
BUN SERPL-MCNC: 9 MG/DL — SIGNIFICANT CHANGE UP (ref 7–18)
CALCIUM SERPL-MCNC: 8.3 MG/DL — LOW (ref 8.4–10.5)
CALCIUM SERPL-MCNC: 8.7 MG/DL — SIGNIFICANT CHANGE UP (ref 8.4–10.5)
CALCIUM SERPL-MCNC: 8.7 MG/DL — SIGNIFICANT CHANGE UP (ref 8.4–10.5)
CHLORIDE SERPL-SCNC: 100 MMOL/L — SIGNIFICANT CHANGE UP (ref 96–108)
CHLORIDE SERPL-SCNC: 88 MMOL/L — LOW (ref 96–108)
CHLORIDE SERPL-SCNC: 96 MMOL/L — SIGNIFICANT CHANGE UP (ref 96–108)
CO2 SERPL-SCNC: 21 MMOL/L — LOW (ref 22–31)
CO2 SERPL-SCNC: 24 MMOL/L — SIGNIFICANT CHANGE UP (ref 22–31)
CO2 SERPL-SCNC: 24 MMOL/L — SIGNIFICANT CHANGE UP (ref 22–31)
COLOR SPEC: YELLOW — SIGNIFICANT CHANGE UP
COLOR SPEC: YELLOW — SIGNIFICANT CHANGE UP
CREAT ?TM UR-MCNC: 68 MG/DL — SIGNIFICANT CHANGE UP
CREAT SERPL-MCNC: 0.55 MG/DL — SIGNIFICANT CHANGE UP (ref 0.5–1.3)
CREAT SERPL-MCNC: 0.69 MG/DL — SIGNIFICANT CHANGE UP (ref 0.5–1.3)
CREAT SERPL-MCNC: 0.92 MG/DL — SIGNIFICANT CHANGE UP (ref 0.5–1.3)
DIFF PNL FLD: NEGATIVE — SIGNIFICANT CHANGE UP
DIFF PNL FLD: NEGATIVE — SIGNIFICANT CHANGE UP
EGFR: 61 ML/MIN/1.73M2 — SIGNIFICANT CHANGE UP
EGFR: 86 ML/MIN/1.73M2 — SIGNIFICANT CHANGE UP
EGFR: 90 ML/MIN/1.73M2 — SIGNIFICANT CHANGE UP
EOSINOPHIL # BLD AUTO: 0.02 K/UL — SIGNIFICANT CHANGE UP (ref 0–0.5)
EOSINOPHIL NFR BLD AUTO: 0.4 % — SIGNIFICANT CHANGE UP (ref 0–6)
EPI CELLS # UR: PRESENT
GAS PNL BLDV: SIGNIFICANT CHANGE UP
GLUCOSE SERPL-MCNC: 105 MG/DL — HIGH (ref 70–99)
GLUCOSE SERPL-MCNC: 126 MG/DL — HIGH (ref 70–99)
GLUCOSE SERPL-MCNC: 98 MG/DL — SIGNIFICANT CHANGE UP (ref 70–99)
GLUCOSE UR QL: NEGATIVE MG/DL — SIGNIFICANT CHANGE UP
GLUCOSE UR QL: NEGATIVE MG/DL — SIGNIFICANT CHANGE UP
HCT VFR BLD CALC: 30.9 % — LOW (ref 34.5–45)
HGB BLD-MCNC: 10.3 G/DL — LOW (ref 11.5–15.5)
IMM GRANULOCYTES NFR BLD AUTO: 0.7 % — SIGNIFICANT CHANGE UP (ref 0–0.9)
KETONES UR-MCNC: 15 MG/DL
KETONES UR-MCNC: NEGATIVE MG/DL — SIGNIFICANT CHANGE UP
LEUKOCYTE ESTERASE UR-ACNC: ABNORMAL
LEUKOCYTE ESTERASE UR-ACNC: ABNORMAL
LYMPHOCYTES # BLD AUTO: 1.13 K/UL — SIGNIFICANT CHANGE UP (ref 1–3.3)
LYMPHOCYTES # BLD AUTO: 20.4 % — SIGNIFICANT CHANGE UP (ref 13–44)
MAGNESIUM SERPL-MCNC: 2 MG/DL — SIGNIFICANT CHANGE UP (ref 1.6–2.6)
MCHC RBC-ENTMCNC: 27.2 PG — SIGNIFICANT CHANGE UP (ref 27–34)
MCHC RBC-ENTMCNC: 33.3 GM/DL — SIGNIFICANT CHANGE UP (ref 32–36)
MCV RBC AUTO: 81.5 FL — SIGNIFICANT CHANGE UP (ref 80–100)
MONOCYTES # BLD AUTO: 0.39 K/UL — SIGNIFICANT CHANGE UP (ref 0–0.9)
MONOCYTES NFR BLD AUTO: 7 % — SIGNIFICANT CHANGE UP (ref 2–14)
NEUTROPHILS # BLD AUTO: 3.94 K/UL — SIGNIFICANT CHANGE UP (ref 1.8–7.4)
NEUTROPHILS NFR BLD AUTO: 71.1 % — SIGNIFICANT CHANGE UP (ref 43–77)
NITRITE UR-MCNC: NEGATIVE — SIGNIFICANT CHANGE UP
NITRITE UR-MCNC: NEGATIVE — SIGNIFICANT CHANGE UP
NRBC # BLD: 0 /100 WBCS — SIGNIFICANT CHANGE UP (ref 0–0)
OSMOLALITY SERPL: 249 MOSMOL/KG — LOW (ref 280–301)
OSMOLALITY UR: 192 MOS/KG — SIGNIFICANT CHANGE UP (ref 50–1200)
OSMOLALITY UR: 268 MOS/KG — SIGNIFICANT CHANGE UP (ref 50–1200)
PH UR: 6.5 — SIGNIFICANT CHANGE UP (ref 5–8)
PH UR: 6.5 — SIGNIFICANT CHANGE UP (ref 5–8)
PLATELET # BLD AUTO: 306 K/UL — SIGNIFICANT CHANGE UP (ref 150–400)
POTASSIUM SERPL-MCNC: 3.5 MMOL/L — SIGNIFICANT CHANGE UP (ref 3.5–5.3)
POTASSIUM SERPL-MCNC: 3.9 MMOL/L — SIGNIFICANT CHANGE UP (ref 3.5–5.3)
POTASSIUM SERPL-MCNC: 3.9 MMOL/L — SIGNIFICANT CHANGE UP (ref 3.5–5.3)
POTASSIUM SERPL-SCNC: 3.5 MMOL/L — SIGNIFICANT CHANGE UP (ref 3.5–5.3)
POTASSIUM SERPL-SCNC: 3.9 MMOL/L — SIGNIFICANT CHANGE UP (ref 3.5–5.3)
POTASSIUM SERPL-SCNC: 3.9 MMOL/L — SIGNIFICANT CHANGE UP (ref 3.5–5.3)
POTASSIUM UR-SCNC: 11 MMOL/L — SIGNIFICANT CHANGE UP
PROT ?TM UR-MCNC: 15 MG/DL — HIGH (ref 0–12)
PROT SERPL-MCNC: 7.3 G/DL — SIGNIFICANT CHANGE UP (ref 6–8.3)
PROT UR-MCNC: NEGATIVE MG/DL — SIGNIFICANT CHANGE UP
PROT UR-MCNC: NEGATIVE MG/DL — SIGNIFICANT CHANGE UP
RBC # BLD: 3.79 M/UL — LOW (ref 3.8–5.2)
RBC # FLD: 13.2 % — SIGNIFICANT CHANGE UP (ref 10.3–14.5)
RBC CASTS # UR COMP ASSIST: 1 /HPF — SIGNIFICANT CHANGE UP (ref 0–4)
SODIUM SERPL-SCNC: 120 MMOL/L — CRITICAL LOW (ref 135–145)
SODIUM SERPL-SCNC: 127 MMOL/L — LOW (ref 135–145)
SODIUM SERPL-SCNC: 131 MMOL/L — LOW (ref 135–145)
SODIUM UR-SCNC: 17 MMOL/L — SIGNIFICANT CHANGE UP
SODIUM UR-SCNC: 35 MMOL/L — SIGNIFICANT CHANGE UP
SP GR SPEC: 1.01 — SIGNIFICANT CHANGE UP (ref 1–1.03)
SP GR SPEC: 1.01 — SIGNIFICANT CHANGE UP (ref 1–1.03)
TROPONIN I, HIGH SENSITIVITY RESULT: 12.1 NG/L — SIGNIFICANT CHANGE UP
UROBILINOGEN FLD QL: 0.2 MG/DL — SIGNIFICANT CHANGE UP (ref 0.2–1)
UROBILINOGEN FLD QL: 0.2 MG/DL — SIGNIFICANT CHANGE UP (ref 0.2–1)
WBC # BLD: 5.54 K/UL — SIGNIFICANT CHANGE UP (ref 3.8–10.5)
WBC # FLD AUTO: 5.54 K/UL — SIGNIFICANT CHANGE UP (ref 3.8–10.5)
WBC UR QL: 6 /HPF — HIGH (ref 0–5)

## 2023-09-07 PROCEDURE — 71045 X-RAY EXAM CHEST 1 VIEW: CPT | Mod: 26

## 2023-09-07 PROCEDURE — 99285 EMERGENCY DEPT VISIT HI MDM: CPT

## 2023-09-07 PROCEDURE — 70450 CT HEAD/BRAIN W/O DYE: CPT | Mod: 26,MA

## 2023-09-07 PROCEDURE — 93010 ELECTROCARDIOGRAM REPORT: CPT

## 2023-09-07 RX ORDER — TRAMADOL HYDROCHLORIDE 50 MG/1
1 TABLET ORAL
Qty: 0 | Refills: 0 | DISCHARGE

## 2023-09-07 RX ORDER — SODIUM CHLORIDE 9 MG/ML
1 INJECTION INTRAMUSCULAR; INTRAVENOUS; SUBCUTANEOUS
Refills: 0 | Status: DISCONTINUED | OUTPATIENT
Start: 2023-09-07 | End: 2023-09-08

## 2023-09-07 RX ORDER — APIXABAN 2.5 MG/1
5 TABLET, FILM COATED ORAL
Refills: 0 | Status: DISCONTINUED | OUTPATIENT
Start: 2023-09-07 | End: 2023-09-10

## 2023-09-07 RX ORDER — LEVOTHYROXINE SODIUM 125 MCG
1 TABLET ORAL
Qty: 0 | Refills: 0 | DISCHARGE

## 2023-09-07 RX ORDER — LATANOPROST 0.05 MG/ML
1 SOLUTION/ DROPS OPHTHALMIC; TOPICAL AT BEDTIME
Refills: 0 | Status: DISCONTINUED | OUTPATIENT
Start: 2023-09-07 | End: 2023-09-10

## 2023-09-07 RX ORDER — FLUTICASONE PROPIONATE 50 MCG
1 SPRAY, SUSPENSION NASAL
Refills: 0 | Status: DISCONTINUED | OUTPATIENT
Start: 2023-09-07 | End: 2023-09-10

## 2023-09-07 RX ORDER — LATANOPROST 0.05 MG/ML
1 SOLUTION/ DROPS OPHTHALMIC; TOPICAL
Qty: 0 | Refills: 0 | DISCHARGE

## 2023-09-07 RX ORDER — SIMVASTATIN 20 MG/1
0 TABLET, FILM COATED ORAL
Qty: 0 | Refills: 0 | DISCHARGE

## 2023-09-07 RX ORDER — SODIUM CHLORIDE 9 MG/ML
1000 INJECTION INTRAMUSCULAR; INTRAVENOUS; SUBCUTANEOUS ONCE
Refills: 0 | Status: DISCONTINUED | OUTPATIENT
Start: 2023-09-07 | End: 2023-09-07

## 2023-09-07 RX ORDER — HYDRALAZINE HCL 50 MG
1 TABLET ORAL
Qty: 0 | Refills: 0 | DISCHARGE

## 2023-09-07 RX ORDER — AMLODIPINE BESYLATE 2.5 MG/1
5 TABLET ORAL DAILY
Refills: 0 | Status: DISCONTINUED | OUTPATIENT
Start: 2023-09-07 | End: 2023-09-10

## 2023-09-07 RX ORDER — TIMOLOL 0.5 %
0 DROPS OPHTHALMIC (EYE)
Qty: 0 | Refills: 0 | DISCHARGE

## 2023-09-07 RX ORDER — APIXABAN 2.5 MG/1
1 TABLET, FILM COATED ORAL
Qty: 0 | Refills: 0 | DISCHARGE

## 2023-09-07 RX ORDER — OLOPATADINE HYDROCHLORIDE 1 MG/ML
0 SOLUTION/ DROPS OPHTHALMIC
Qty: 0 | Refills: 0 | DISCHARGE

## 2023-09-07 RX ORDER — AMLODIPINE BESYLATE 2.5 MG/1
0 TABLET ORAL
Qty: 0 | Refills: 0 | DISCHARGE

## 2023-09-07 RX ORDER — PROPRANOLOL HCL 160 MG
1 CAPSULE, EXTENDED RELEASE 24HR ORAL
Qty: 0 | Refills: 0 | DISCHARGE

## 2023-09-07 RX ORDER — SIMVASTATIN 20 MG/1
10 TABLET, FILM COATED ORAL AT BEDTIME
Refills: 0 | Status: DISCONTINUED | OUTPATIENT
Start: 2023-09-07 | End: 2023-09-10

## 2023-09-07 RX ORDER — BIMATOPROST 0.3 MG/ML
0 SOLUTION/ DROPS OPHTHALMIC
Qty: 0 | Refills: 0 | DISCHARGE

## 2023-09-07 RX ORDER — BACLOFEN 100 %
1 POWDER (GRAM) MISCELLANEOUS
Qty: 0 | Refills: 0 | DISCHARGE

## 2023-09-07 RX ORDER — HYDRALAZINE HCL 50 MG
25 TABLET ORAL THREE TIMES A DAY
Refills: 0 | Status: DISCONTINUED | OUTPATIENT
Start: 2023-09-07 | End: 2023-09-10

## 2023-09-07 RX ORDER — DICLOFENAC SODIUM 30 MG/G
0 GEL TOPICAL
Qty: 0 | Refills: 0 | DISCHARGE

## 2023-09-07 RX ORDER — ACETAMINOPHEN 500 MG
650 TABLET ORAL ONCE
Refills: 0 | Status: COMPLETED | OUTPATIENT
Start: 2023-09-07 | End: 2023-09-07

## 2023-09-07 RX ORDER — LINACLOTIDE 145 UG/1
0 CAPSULE, GELATIN COATED ORAL
Qty: 0 | Refills: 0 | DISCHARGE

## 2023-09-07 RX ORDER — TIMOLOL 0.5 %
1 DROPS OPHTHALMIC (EYE)
Qty: 0 | Refills: 0 | DISCHARGE

## 2023-09-07 RX ORDER — PROPRANOLOL HCL 160 MG
0 CAPSULE, EXTENDED RELEASE 24HR ORAL
Qty: 0 | Refills: 0 | DISCHARGE

## 2023-09-07 RX ORDER — KETOTIFEN FUMARATE 0.34 MG/ML
1 SOLUTION OPHTHALMIC
Refills: 0 | Status: DISCONTINUED | OUTPATIENT
Start: 2023-09-07 | End: 2023-09-10

## 2023-09-07 RX ORDER — LEVOTHYROXINE SODIUM 125 MCG
50 TABLET ORAL DAILY
Refills: 0 | Status: DISCONTINUED | OUTPATIENT
Start: 2023-09-07 | End: 2023-09-10

## 2023-09-07 RX ORDER — HYDRALAZINE HCL 50 MG
0 TABLET ORAL
Qty: 0 | Refills: 0 | DISCHARGE

## 2023-09-07 RX ORDER — SODIUM CHLORIDE 9 MG/ML
1000 INJECTION, SOLUTION INTRAVENOUS
Refills: 0 | Status: DISCONTINUED | OUTPATIENT
Start: 2023-09-07 | End: 2023-09-08

## 2023-09-07 RX ORDER — APIXABAN 2.5 MG/1
0 TABLET, FILM COATED ORAL
Qty: 0 | Refills: 0 | DISCHARGE

## 2023-09-07 RX ORDER — ONDANSETRON 8 MG/1
4 TABLET, FILM COATED ORAL ONCE
Refills: 0 | Status: COMPLETED | OUTPATIENT
Start: 2023-09-07 | End: 2023-09-07

## 2023-09-07 RX ORDER — ONDANSETRON 8 MG/1
4 TABLET, FILM COATED ORAL EVERY 8 HOURS
Refills: 0 | Status: DISCONTINUED | OUTPATIENT
Start: 2023-09-07 | End: 2023-09-10

## 2023-09-07 RX ORDER — TIMOLOL 0.5 %
1 DROPS OPHTHALMIC (EYE)
Refills: 0 | Status: DISCONTINUED | OUTPATIENT
Start: 2023-09-07 | End: 2023-09-10

## 2023-09-07 RX ORDER — LEVOTHYROXINE SODIUM 125 MCG
0 TABLET ORAL
Qty: 0 | Refills: 0 | DISCHARGE

## 2023-09-07 RX ORDER — SIMVASTATIN 20 MG/1
1 TABLET, FILM COATED ORAL
Qty: 0 | Refills: 0 | DISCHARGE

## 2023-09-07 RX ORDER — ACETAMINOPHEN 500 MG
650 TABLET ORAL EVERY 6 HOURS
Refills: 0 | Status: DISCONTINUED | OUTPATIENT
Start: 2023-09-07 | End: 2023-09-10

## 2023-09-07 RX ORDER — AMLODIPINE BESYLATE 2.5 MG/1
1 TABLET ORAL
Qty: 0 | Refills: 0 | DISCHARGE

## 2023-09-07 RX ADMIN — SODIUM CHLORIDE 1 GRAM(S): 9 INJECTION INTRAMUSCULAR; INTRAVENOUS; SUBCUTANEOUS at 18:49

## 2023-09-07 RX ADMIN — Medication 1 SPRAY(S): at 18:51

## 2023-09-07 RX ADMIN — LATANOPROST 1 DROP(S): 0.05 SOLUTION/ DROPS OPHTHALMIC; TOPICAL at 23:40

## 2023-09-07 RX ADMIN — Medication 1 DROP(S): at 18:53

## 2023-09-07 RX ADMIN — KETOTIFEN FUMARATE 1 DROP(S): 0.34 SOLUTION OPHTHALMIC at 18:52

## 2023-09-07 RX ADMIN — SODIUM CHLORIDE 50 MILLILITER(S): 9 INJECTION, SOLUTION INTRAVENOUS at 23:41

## 2023-09-07 RX ADMIN — SIMVASTATIN 10 MILLIGRAM(S): 20 TABLET, FILM COATED ORAL at 23:40

## 2023-09-07 RX ADMIN — APIXABAN 5 MILLIGRAM(S): 2.5 TABLET, FILM COATED ORAL at 18:49

## 2023-09-07 NOTE — H&P ADULT - NSICDXPASTMEDICALHX_GEN_ALL_CORE_FT
PAST MEDICAL HISTORY:  Atrial fibrillation     HTN (hypertension)     Hyponatremia     Hypothyroidism, adult

## 2023-09-07 NOTE — H&P ADULT - ASSESSMENT
83 yo F from home w HTN, recurrent hyponatremia requiring multiple hospitalizations, HLD, a fib on eliquis, hypothyroid, glaucoma, admitted for hyponatremia.

## 2023-09-07 NOTE — ED PROVIDER NOTE - CLINICAL SUMMARY MEDICAL DECISION MAKING FREE TEXT BOX
Character of headache low suspicion for SAH or ICH, and CT head ________. No e/o glaucoma. No fever or meningeal signs. Character low suspicion for ACS, and ECG/troponin _______. No abd pain or tenderness. UA _________. Character of headache low suspicion for SAH or ICH or cerebral edema, and CT head negative. No e/o glaucoma. No fever or meningeal signs. Character low suspicion for ACS, and ECG/troponin unremarkable. No abd pain or tenderness. UA unremarkable. Confirmed hypoNa to 120. Appears somewhat dehydrated and given 1L NS. Character of headache low suspicion for SAH or ICH or cerebral edema, and CT head negative. No e/o glaucoma. No fever or meningeal signs. Character low suspicion for ACS, and ECG/troponin unremarkable. No abd pain or tenderness. UA unremarkable. Confirmed hypoNa to 120. Appears somewhat dehydrated and given 1L NS. No e/o acute effect of hypoNa at this time. D/w Dr. Miller of ICU who recommends floor admission at this time. Patient well appearing, hemodynamically stable. Admitted to internal medicine for further monitoring, w/u, and care.

## 2023-09-07 NOTE — H&P ADULT - NSHPPHYSICALEXAM_GEN_ALL_CORE
T(C): 37.2 (09-07-23 @ 15:35), Max: 37.2 (09-07-23 @ 15:35)  HR: 76 (09-07-23 @ 15:35) (72 - 76)  BP: 159/71 (09-07-23 @ 15:35) (159/71 - 168/78)  RR: 18 (09-07-23 @ 15:35) (18 - 18)  SpO2: 96% (09-07-23 @ 15:35) (96% - 96%)    CONSTITUTIONAL: Well groomed, no apparent distress    HEENT: normotramuatic, acephalic, PERRL and symmetric, EOMI, No conjunctival or scleral injection, non-icteric. Oral mucosa with moist membranes. No external nasal lesions; nasal mucosa not inflamed; no pharyngeal injection or exudates.               Neck: supple, symmetric and without tracheal deviation    RESPIRATORY: No respiratory distress, no use of accessory muscles; CTA b/l, no wheezes, rales or rhonchi    CARDIOVASCULAR: RRRR, +S1S2, no murmurs, no rubs, no gallops; no JVD; feet red and slightly swollen with trace pitting edema  	Vascular: carotid pulse palpable, radial pulse palpable    GASTROINTESTINAL: +BS, Soft, non tender, non distended, no rebound, no guarding; No palpable masses, obese    MUSCULOSKELETAL: No digital clubbing or cyanosis; examination of the (head/neck, spine/ribs/pelvis, RUE, LUE, RLE, LLE) without misalignment, no spinal tenderness    SKIN: No rashes or ulcers noted; no subcutaneous nodules or induration palpable    NEUROLOGIC: sensation intact in upper and lower extremities b/l to light touch; strength appropriate    PSYCHIATRIC: poor insight/judgment; A+O x 3, mood and affect appropriate, recent/remote memory not intact    LYMPHATIC: No cervical LAD or tenderness    GENITOURINARY: No suprapubic tenderness, no CVA tenderness

## 2023-09-07 NOTE — ED ADULT NURSE NOTE - NSFALLHARMRISKINTERV_ED_ALL_ED

## 2023-09-07 NOTE — H&P ADULT - HISTORY OF PRESENT ILLNESS
Pt is an 83 yo F from home, ambulates w cane or walker, w recurrent hyponatremia, HTN, HLD, hypothyroid, afib on eliquis, presenting with hyponatremia. Pt herself is a poor narrator, collateral obtained from daughter Anna Gar (listed as Anna Morales under contacts). Pt reportedly was weak since yesterday, had NBNB emesis x1 last night, and has been shaky since this morning, and experiencing severe headache refractory to tylenol at the top and back of the head. BP yesterday was reportedly high at 180, and drank a lot of green tea and took extra hydralazine. This morning pt was confused, not understanding surroundings, prompting daughter to suspect hyponatremia as pt exhibitied similar symptoms and signs in the past which required hospitalization for hyponatremia. Of note, pt was d/c from [facility] in 2021 with salt tabs, but as per Anna, pt has not been taking them because they were told that pt has high BP and could not take salt tabs.    Pt received approx 600 cc fluid in ED and after that began to mentate at approx baseline, as per daughter who spoke to her on the phone. At the time of this assessment pt was back to baseline. She denied any current ssx including headahce, n/v, chest pain, sob, ab pain.

## 2023-09-07 NOTE — CHART NOTE - NSCHARTNOTEFT_GEN_A_CORE
EVENT: Sodium: 131 mmol/L (09.07.23 @ 22:00)  Sodium: 127 mmol/L (09.07.23 @ 15:00)  Sodium: 120 mmol/L (09.07.23 @ 11:31)    BRIEF HPI: 8:5 yo F from home w HTN, recurrent hyponatremia requiring multiple hospitalizations, HDL, a fib on ELIQUIS, hypothyroid, glaucoma, admitted for hyponatremia. Multiple admissions for hyponatremia, was prev dc on salt tabs but as per daughter not on them because of concern for BP. Na 120 on admission.    OBJECTIVE:  Vital Signs Last 24 Hrs  T(C): 37.2 (07 Sep 2023 15:35), Max: 37.2 (07 Sep 2023 15:35)  T(F): 98.9 (07 Sep 2023 15:35), Max: 98.9 (07 Sep 2023 15:35)  HR: 76 (07 Sep 2023 15:35) (72 - 76)  BP: 159/71 (07 Sep 2023 15:35) (159/71 - 168/78)  BP(mean): --  RR: 18 (07 Sep 2023 15:35) (18 - 18)  SpO2: 96% (07 Sep 2023 15:35) (96% - 96%)    Parameters below as of 07 Sep 2023 15:35  Patient On (Oxygen Delivery Method): room air        FOCUSED PHYSICAL EXAM:    LABS:                        10.3   5.54  )-----------( 306      ( 07 Sep 2023 11:31 )             30.9     09-07    131<L>  |  100  |  9   ----------------------------<  126<H>  3.5   |  24  |  0.92    Ca    8.7      07 Sep 2023 22:00  Mg     2.0     09-07    TPro  7.3  /  Alb  3.5  /  TBili  0.8  /  DBili  x   /  AST  32  /  ALT  20  /  AlkPhos  60  09-07      EKG:   IMGAGING:    ASSESSMENT:  HPI:  Pt is an 83 yo F from home, ambulates w cane or walker, w recurrent hyponatremia, HTN, HLD, hypothyroid, afib on eliquis, presenting with hyponatremia. Pt herself is a poor narrator, collateral obtained from daughter Anna Gar (listed as Anna Morales under contacts). Pt reportedly was weak since yesterday, had NBNB emesis x1 last night, and has been shaky since this morning, and experiencing severe headache refractory to tylenol at the top and back of the head. BP yesterday was reportedly high at 180, and drank a lot of green tea and took extra hydralazine. This morning pt was confused, not understanding surroundings, prompting daughter to suspect hyponatremia as pt exhibitied similar symptoms and signs in the past which required hospitalization for hyponatremia. Of note, pt was d/c from [facility] in 2021 with salt tabs, but as per Anna, pt has not been taking them because they were told that pt has high BP and could not take salt tabs.    Pt received approx 600 cc fluid in ED and after that began to mentate at approx baseline, as per daughter who spoke to her on the phone. At the time of this assessment pt was back to baseline. She denied any current ssx including headahce, n/v, chest pain, sob, ab pain. (07 Sep 2023 15:15)      PLAN:     MEDICATIONS  (STANDING):  amLODIPine   Tablet 5 milliGRAM(s) Oral daily  apixaban 5 milliGRAM(s) Oral two times a day  dextrose 5%. 1000 milliLiter(s) (50 mL/Hr) IV Continuous <Continuous>  fluticasone propionate 50 MICROgram(s)/spray Nasal Spray 1 Spray(s) Both Nostrils two times a day  hydrALAZINE 25 milliGRAM(s) Oral three times a day  ketotifen 0.025% Ophthalmic Solution 1 Drop(s) Both EYES two times a day  latanoprost 0.005% Ophthalmic Solution 1 Drop(s) Both EYES at bedtime  levothyroxine 50 MICROGram(s) Oral daily  propranolol 10 milliGRAM(s) Oral two times a day  simvastatin 10 milliGRAM(s) Oral at bedtime  sodium chloride 1 Gram(s) Oral two times a day  timolol 0.5% Solution 1 Drop(s) Both EYES two times a day    MEDICATIONS  (PRN):  acetaminophen     Tablet .. 650 milliGRAM(s) Oral every 6 hours PRN Temp greater or equal to 38C (100.4F), Mild Pain (1 - 3)  ondansetron Injectable 4 milliGRAM(s) IV Push every 8 hours PRN Nausea and/or Vomiting      FOLLOW UP / RESULT: EVENT: Sodium: 131 mmol/L (09.07.23 @ 22:00)  Sodium: 127 mmol/L (09.07.23 @ 15:00)  Sodium: 120 mmol/L (09.07.23 @ 11:31)    BRIEF HPI: 8:5 yo F from home w HTN, recurrent hyponatremia requiring multiple hospitalizations, HDL, a fib on ELIQUIS, hypothyroid, glaucoma, admitted for hyponatremia. Multiple admissions for hyponatremia, was prev dc on salt tabs but as per daughter not on them because of concern for BP. Na 120 on admission.    OBJECTIVE:  Vital Signs Last 24 Hrs  T(C): 37.2 (07 Sep 2023 15:35), Max: 37.2 (07 Sep 2023 15:35)  T(F): 98.9 (07 Sep 2023 15:35), Max: 98.9 (07 Sep 2023 15:35)  HR: 76 (07 Sep 2023 15:35) (72 - 76)  BP: 159/71 (07 Sep 2023 15:35) (159/71 - 168/78)  BP(mean): --  RR: 18 (07 Sep 2023 15:35) (18 - 18)  SpO2: 96% (07 Sep 2023 15:35) (96% - 96%)    Parameters below as of 07 Sep 2023 15:35  Patient On (Oxygen Delivery Method): room air    FOCUSED PHYSICAL EXAM:    LABS:                        10.3   5.54  )-----------( 306      ( 07 Sep 2023 11:31 )             30.9     09-07    131<L>  |  100  |  9   ----------------------------<  126<H>  3.5   |  24  |  0.92    Ca    8.7      07 Sep 2023 22:00  Mg     2.0     09-07    TPro  7.3  /  Alb  3.5  /  TBili  0.8  /  DBili  x   /  AST  32  /  ALT  20  /  AlkPhos  60  09-07      EKG:   IMGAGING:    ASSESSMENT:  HPI:  Pt is an 81 yo F from home, ambulates w cane or walker, w recurrent hyponatremia, HTN, HLD, hypothyroid, afib on eliquis, presenting with hyponatremia. Pt herself is a poor narrator, collateral obtained from daughter Anna Gar (listed as Anna Morales under contacts). Pt reportedly was weak since yesterday, had NBNB emesis x1 last night, and has been shaky since this morning, and experiencing severe headache refractory to tylenol at the top and back of the head. BP yesterday was reportedly high at 180, and drank a lot of green tea and took extra hydralazine. This morning pt was confused, not understanding surroundings, prompting daughter to suspect hyponatremia as pt exhibitied similar symptoms and signs in the past which required hospitalization for hyponatremia. Of note, pt was d/c from [facility] in 2021 with salt tabs, but as per Anna, pt has not been taking them because they were told that pt has high BP and could not take salt tabs.    Pt received approx 600 cc fluid in ED and after that began to mentate at approx baseline, as per daughter who spoke to her on the phone. At the time of this assessment pt was back to baseline. She denied any current ssx including headahce, n/v, chest pain, sob, ab pain. (07 Sep 2023 15:15)      PLAN:     MEDICATIONS  (STANDING):  amLODIPine   Tablet 5 milliGRAM(s) Oral daily  apixaban 5 milliGRAM(s) Oral two times a day  dextrose 5%. 1000 milliLiter(s) (50 mL/Hr) IV Continuous <Continuous>  fluticasone propionate 50 MICROgram(s)/spray Nasal Spray 1 Spray(s) Both Nostrils two times a day  hydrALAZINE 25 milliGRAM(s) Oral three times a day  ketotifen 0.025% Ophthalmic Solution 1 Drop(s) Both EYES two times a day  latanoprost 0.005% Ophthalmic Solution 1 Drop(s) Both EYES at bedtime  levothyroxine 50 MICROGram(s) Oral daily  propranolol 10 milliGRAM(s) Oral two times a day  simvastatin 10 milliGRAM(s) Oral at bedtime  sodium chloride 1 Gram(s) Oral two times a day  timolol 0.5% Solution 1 Drop(s) Both EYES two times a day    MEDICATIONS  (PRN):  acetaminophen     Tablet .. 650 milliGRAM(s) Oral every 6 hours PRN Temp greater or equal to 38C (100.4F), Mild Pain (1 - 3)  ondansetron Injectable 4 milliGRAM(s) IV Push every 8 hours PRN Nausea and/or Vomiting      FOLLOW UP / RESULT: EVENT: Sodium: 131 mmol/L (09.07.23 @ 22:00)  Sodium: 127 mmol/L (09.07.23 @ 15:00)  Sodium: 120 mmol/L (09.07.23 @ 11:31)    BRIEF HPI: 8:3 yo F from home w HTN, recurrent hyponatremia requiring multiple hospitalizations, HDL, a fib on ELIQUIS, hypothyroid, glaucoma, admitted for hyponatremia. Multiple admissions for hyponatremia, was prev dc on salt tabs but as per daughter not on them because of concern for BP. Na 120 on admission.    OBJECTIVE:  Vital Signs Last 24 Hrs  T(C): 37.2 (07 Sep 2023 15:35), Max: 37.2 (07 Sep 2023 15:35)  T(F): 98.9 (07 Sep 2023 15:35), Max: 98.9 (07 Sep 2023 15:35)  HR: 76 (07 Sep 2023 15:35) (72 - 76)  BP: 159/71 (07 Sep 2023 15:35) (159/71 - 168/78)  BP(mean): --  RR: 18 (07 Sep 2023 15:35) (18 - 18)  SpO2: 96% (07 Sep 2023 15:35) (96% - 96%)    Parameters below as of 07 Sep 2023 15:35  Patient On (Oxygen Delivery Method): room air    FOCUSED PHYSICAL EXAM:  NEURO: Alert, oriented X 3  RESP: Even, unlabored  CV S1 S2 irregular    LABS:                        10.3   5.54  )-----------( 306      ( 07 Sep 2023 11:31 )             30.9     09-07    131<L>  |  100  |  9   ----------------------------<  126<H>  3.5   |  24  |  0.92    Ca    8.7      07 Sep 2023 22:00  Mg     2.0     09-07    TPro  7.3  /  Alb  3.5  /  TBili  0.8  /  DBili  x   /  AST  32  /  ALT  20  /  AlkPhos  60  09-07    PLAN:   Dextrose 5%. 1000 milliliter(s) (50 mL/Hr) IV Continuous   Cont sodium chloride 1 Gram(s) Oral two times a day    FOLLOW UP / RESULT: sodium level EVENT: Sodium: 131 mmol/L (09.07.23 @ 22:00)  Sodium: 127 mmol/L (09.07.23 @ 15:00)  Sodium: 120 mmol/L (09.07.23 @ 11:31)    BRIEF HPI: 81 yo F from home w HTN, recurrent hyponatremia requiring multiple hospitalizations, HDL, a fib on ELIQUIS, hypothyroid, glaucoma, admitted for hyponatremia. Multiple admissions for hyponatremia, was prev dc on salt tabs but as per daughter not on them because of concern for BP. Na 120 on admission.    OBJECTIVE:  Vital Signs Last 24 Hrs  T(C): 37.2 (07 Sep 2023 15:35), Max: 37.2 (07 Sep 2023 15:35)  T(F): 98.9 (07 Sep 2023 15:35), Max: 98.9 (07 Sep 2023 15:35)  HR: 76 (07 Sep 2023 15:35) (72 - 76)  BP: 159/71 (07 Sep 2023 15:35) (159/71 - 168/78)  BP(mean): --  RR: 18 (07 Sep 2023 15:35) (18 - 18)  SpO2: 96% (07 Sep 2023 15:35) (96% - 96%)    Parameters below as of 07 Sep 2023 15:35  Patient On (Oxygen Delivery Method): room air    FOCUSED PHYSICAL EXAM:  NEURO: Alert, oriented X 3  RESP: Even, unlabored  CV S1 S2 irregular    LABS:                        10.3   5.54  )-----------( 306      ( 07 Sep 2023 11:31 )             30.9     09-07    131<L>  |  100  |  9   ----------------------------<  126<H>  3.5   |  24  |  0.92    Ca    8.7      07 Sep 2023 22:00  Mg     2.0     09-07    TPro  7.3  /  Alb  3.5  /  TBili  0.8  /  DBili  x   /  AST  32  /  ALT  20  /  AlkPhos  60  09-07    PLAN:   Dextrose 5%. 1000 milliliter(s) (50 mL/Hr) IV Continuous   Cont sodium chloride 1 Gram(s) Oral two times a day    FOLLOW UP / RESULT: sodium level

## 2023-09-07 NOTE — ED PROVIDER NOTE - PHYSICAL EXAMINATION
On exam, afebrile, hemodynamically stable, saturating well on room air, NAD, elderly, nontoxic appearing, intention tremor, sitting comfortably in bed, no WOB/tachypnea, speaking full sentences, head NCAT, EOMI, anicteric, no nystagmus, MM dry, no JVD, RRR, nml S1/S2, no m/r/g, lungs CTAB, no w/r/r, abd soft, NT, ND, nml BS, no rebound or guarding, AAO, CN's 3-12 grossly intact, motor 5/5 in all extremities, SEXTON spontaneously, no leg cyanosis or edema, skin warm, dry.

## 2023-09-07 NOTE — CONSULT NOTE ADULT - SUBJECTIVE AND OBJECTIVE BOX
[facility] NEPHROLOGY- CONSULTATION NOTE    Patient is a 83yo Sw[place] speaking Female with HTN (on Amlodipine & Hydralazine), Afib on Eliquis and Hypothyroidism presents with n/v and headache. Pt a/w Na 120. Nephrology consulted for hyponatremia    used Swazi video  # [id number]  Pt is poor historian; pt denies h/o hyponatremia but then states she takes salt tablets but cannot clarify. Pt c/o intermittent severe headaches for which she takes tylenol. Patient denies any NSAID use, diuretic or laxative use.   Pt denies any SOB, chest pain, n/vd, abd pain, or urinary complaints. Pt c/o intermittent LE edema    Spoke with pt's daughter Anna. She denies any salt tabs but previously has been hospitalized for HA and hyponatremia. She states pt has +insomnia for 24 hrs.  +elevated BP in the 180s for which she was told to take green tea and an extra dose of hydralazine by her PMD. As per daughter she had 1 episode of naseua with vomiting yesterday but severe HA.   +green tea- 2 cups daily, 1 cup of coffee, 2 cups of water  Patient denies any NSAID use, diuretic or laxative use.     PAST MEDICAL & SURGICAL HISTORY:  HTN (hypertension)      Atrial fibrillation      Hypothyroidism, adult      No significant past surgical history        No Known Allergies    Home Medications Reviewed  Hospital Medications:   MEDICATIONS  (STANDING):  acetaminophen     Tablet .. 650 milliGRAM(s) Oral once  ondansetron Injectable 4 milliGRAM(s) IV Push once  sodium chloride 0.9% Bolus 1000 milliLiter(s) IV Bolus once    SOCIAL HISTORY:  Denies ETOh, Smoking, or drug use  FAMILY HISTORY:      REVIEW OF SYSTEMS:  Gen: no changes in weight +HA  HEENT: no rhinorrhea  Neck: no sore throat  Cards: no chest pain  Resp: no dyspnea  GI: no nausea or vomiting or diarrhea  : no dysuria or hematuria  Vascular: no LE edema  Derm: no rashes  Neuro: no numbness/tingling  All other review of systems is negative unless indicated above.    VITALS:  T(F): 98.1 (23 @ 09:48), Max: 98.1 (23 @ 09:48)  HR: 72 (23 @ 09:48)  BP: 168/78 (23 @ 09:48)  RR: 18 (23 @ 09:48)  SpO2: 96% (23 @ 09:48)  Wt(kg): --      Weight (kg): 71.2 ( @ 09:48)    PHYSICAL EXAM:  Gen: NAD, calm  HEENT: MMM  Neck: no JVD  Cards: RRR, +S1/S2, no M/G/R  Resp: CTA B/L  GI: soft, NT/ND, NABS  : no CVA tenderness  Extremities: no LE edema B/L  Derm: no rashes  Neuro: non-focal    LABS:      120<LL>  |  88<L>  |  8   ----------------------------<  105<H>  3.9   |  21<L>  |  0.69    Ca    8.3<L>      07 Sep 2023 11:31  Mg     2.0         TPro  7.3  /  Alb  3.5  /  TBili  0.8  /  DBili      /  AST  32  /  ALT  20  /  AlkPhos  60      Creatinine Trend: 0.69 <--                        10.3   5.54  )-----------( 306      ( 07 Sep 2023 11:31 )             30.9     Urine Studies:  Urinalysis Basic - ( 07 Sep 2023 11:31 )    Color: Yellow / Appearance: Cloudy / S.010 / pH:   Gluc: 105 mg/dL / Ketone: 15 mg/dL  / Bili: Negative / Urobili: 0.2 mg/dL   Blood:  / Protein: Negative mg/dL / Nitrite: Negative   Leuk Esterase: Small / RBC: 1 /HPF / WBC 2 /HPF   Sq Epi:  / Non Sq Epi:  / Bacteria: Negative /HPF      Osmolality, Random Urine: 268 mos/kg ( @ 11:31)  Sodium, Random Urine: 35 mmol/L ( @ 11:31)    RADIOLOGY & ADDITIONAL STUDIES:      < from: CT Head No Cont (23 @ 11:39) >    ACC: [id number] EXAM:  CT BRAIN   ORDERED BY: ALEX DUKES     PROCEDURE DATE:  2023          INTERPRETATION:  Clinical Indication: Headaches    5mm axial sections of the brain were obtained from base to vertex,   without the intravenous administration of contrast material. Coronal and   sagittal computer generated reconstructions are available.    Comparison is made with the prior CT of 2021.    The ventricles and sulci are prominent consistent age appropriate   involutional changes. Small vessel white matter ischemic changes are   noted. There is no hemorrhage, mass, or shift of midline structures. Bone   window examination is unremarkable. Paranasal sinuses are clear.    IMPRESSION: Age-appropriate involutional and ischemic gliotic changes. No   hemorrhage. No change since 2021.    --- End of Report ---        < end of copied text >

## 2023-09-07 NOTE — ED PROVIDER NOTE - OBJECTIVE STATEMENT
Declines , uses daughter at bedside. 82-year-old female with history of HTN, hypothyroidism, A-fib, presents with daughter with nausea, shakiness, pain to the entire head, since last night. Associated one episode of NBNB emesis last night. Also not urinating this morning. Daughter states this is identical to past episodes when she was hyponatremic. Meds are Eliquis, hydralazine, amlodipine, levothyroxine, statin, aspirin. Denies fever, cough, fall/trauma, vomiting, diarrhea, and all other symptoms.

## 2023-09-07 NOTE — H&P ADULT - PROBLEM SELECTOR PLAN 1
Multiple admissions for hyponatremia, was prev dc on salt tabs but as per daughter not on them because of concern for BP  Na 120 on admission  s/p 1L NS in ED  mentation improving  CTH unchanged from prior admission  EKG reassuring    -Nephro Dr. Benoit  -q4h BMP  -f/u urine lytes  -I/O's  -1500 cc fluid restriction  -correct no more than 8 meq/day Multiple admissions for hyponatremia, was prev dc on salt tabs but as per daughter not on them because of concern for BP  Na 120 on admission  s/p 1L NS in ED  mentation improving  CTH unchanged from prior admission  EKG reassuring    -Nephro Dr. Benoit  -q4h BMP  -f/u urine lytes  -I/O's  -1000 cc fluid restriction  -correct no more than 8 meq/day  -resume salt tabs, 1 g BID Multiple admissions for hyponatremia, was prev dc on salt tabs but as per daughter not on them because of concern for BP  Na 120 on admission  s/p approx 600 cc NS in ED  mentation improving  CTH unchanged from prior admission  EKG reassuring    -Nephro Dr. Benoit  -q4h BMP  -f/u urine lytes  -I/O's  -1000 cc fluid restriction  -correct no more than 8 meq/day  -resume salt tabs, 1 g BID

## 2023-09-07 NOTE — H&P ADULT - PROBLEM SELECTOR PLAN 3
cont amlodipine, hydralazine, propranolol cont amlodipine, propranolol  Increase home hydralazine to TID, as pt only taking BID, and has been having elevated SBP to 180s at home, and drinking additional hypotonic fluids to compensate like green tea

## 2023-09-07 NOTE — H&P ADULT - PROBLEM SELECTOR PLAN 5
lovenox  fall precautions cont home eye drops  latonoprost  timolol >> ketotifen eye drops  hold lumigan as therapeutic interchange would be latonoprost

## 2023-09-08 DIAGNOSIS — Z02.9 ENCOUNTER FOR ADMINISTRATIVE EXAMINATIONS, UNSPECIFIED: ICD-10-CM

## 2023-09-08 LAB
ANION GAP SERPL CALC-SCNC: 3 MMOL/L — LOW (ref 5–17)
ANION GAP SERPL CALC-SCNC: 4 MMOL/L — LOW (ref 5–17)
ANION GAP SERPL CALC-SCNC: 5 MMOL/L — SIGNIFICANT CHANGE UP (ref 5–17)
BUN SERPL-MCNC: 12 MG/DL — SIGNIFICANT CHANGE UP (ref 7–18)
BUN SERPL-MCNC: 16 MG/DL — SIGNIFICANT CHANGE UP (ref 7–18)
BUN SERPL-MCNC: 21 MG/DL — HIGH (ref 7–18)
CALCIUM SERPL-MCNC: 8.2 MG/DL — LOW (ref 8.4–10.5)
CALCIUM SERPL-MCNC: 8.4 MG/DL — SIGNIFICANT CHANGE UP (ref 8.4–10.5)
CALCIUM SERPL-MCNC: 8.7 MG/DL — SIGNIFICANT CHANGE UP (ref 8.4–10.5)
CHLORIDE SERPL-SCNC: 105 MMOL/L — SIGNIFICANT CHANGE UP (ref 96–108)
CHLORIDE SERPL-SCNC: 105 MMOL/L — SIGNIFICANT CHANGE UP (ref 96–108)
CHLORIDE SERPL-SCNC: 107 MMOL/L — SIGNIFICANT CHANGE UP (ref 96–108)
CO2 SERPL-SCNC: 25 MMOL/L — SIGNIFICANT CHANGE UP (ref 22–31)
CO2 SERPL-SCNC: 26 MMOL/L — SIGNIFICANT CHANGE UP (ref 22–31)
CO2 SERPL-SCNC: 26 MMOL/L — SIGNIFICANT CHANGE UP (ref 22–31)
CORTIS AM PEAK SERPL-MCNC: 12.3 UG/DL — SIGNIFICANT CHANGE UP (ref 6–18.4)
CREAT SERPL-MCNC: 0.76 MG/DL — SIGNIFICANT CHANGE UP (ref 0.5–1.3)
CREAT SERPL-MCNC: 0.82 MG/DL — SIGNIFICANT CHANGE UP (ref 0.5–1.3)
CREAT SERPL-MCNC: 0.83 MG/DL — SIGNIFICANT CHANGE UP (ref 0.5–1.3)
EGFR: 69 ML/MIN/1.73M2 — SIGNIFICANT CHANGE UP
EGFR: 70 ML/MIN/1.73M2 — SIGNIFICANT CHANGE UP
EGFR: 77 ML/MIN/1.73M2 — SIGNIFICANT CHANGE UP
GLUCOSE SERPL-MCNC: 103 MG/DL — HIGH (ref 70–99)
GLUCOSE SERPL-MCNC: 123 MG/DL — HIGH (ref 70–99)
GLUCOSE SERPL-MCNC: 135 MG/DL — HIGH (ref 70–99)
HCT VFR BLD CALC: 30.6 % — LOW (ref 34.5–45)
HGB BLD-MCNC: 10 G/DL — LOW (ref 11.5–15.5)
MAGNESIUM SERPL-MCNC: 2.4 MG/DL — SIGNIFICANT CHANGE UP (ref 1.6–2.6)
MCHC RBC-ENTMCNC: 27.2 PG — SIGNIFICANT CHANGE UP (ref 27–34)
MCHC RBC-ENTMCNC: 32.7 GM/DL — SIGNIFICANT CHANGE UP (ref 32–36)
MCV RBC AUTO: 83.2 FL — SIGNIFICANT CHANGE UP (ref 80–100)
NRBC # BLD: 0 /100 WBCS — SIGNIFICANT CHANGE UP (ref 0–0)
OSMOLALITY SERPL: 284 MOSMOL/KG — SIGNIFICANT CHANGE UP (ref 280–301)
OSMOLALITY UR: 581 MOS/KG — SIGNIFICANT CHANGE UP (ref 50–1200)
PHOSPHATE SERPL-MCNC: 2.4 MG/DL — LOW (ref 2.5–4.5)
PLATELET # BLD AUTO: 281 K/UL — SIGNIFICANT CHANGE UP (ref 150–400)
POTASSIUM SERPL-MCNC: 3.6 MMOL/L — SIGNIFICANT CHANGE UP (ref 3.5–5.3)
POTASSIUM SERPL-MCNC: 3.7 MMOL/L — SIGNIFICANT CHANGE UP (ref 3.5–5.3)
POTASSIUM SERPL-MCNC: 4 MMOL/L — SIGNIFICANT CHANGE UP (ref 3.5–5.3)
POTASSIUM SERPL-SCNC: 3.6 MMOL/L — SIGNIFICANT CHANGE UP (ref 3.5–5.3)
POTASSIUM SERPL-SCNC: 3.7 MMOL/L — SIGNIFICANT CHANGE UP (ref 3.5–5.3)
POTASSIUM SERPL-SCNC: 4 MMOL/L — SIGNIFICANT CHANGE UP (ref 3.5–5.3)
RBC # BLD: 3.68 M/UL — LOW (ref 3.8–5.2)
RBC # FLD: 13.7 % — SIGNIFICANT CHANGE UP (ref 10.3–14.5)
SODIUM SERPL-SCNC: 134 MMOL/L — LOW (ref 135–145)
SODIUM SERPL-SCNC: 135 MMOL/L — SIGNIFICANT CHANGE UP (ref 135–145)
SODIUM SERPL-SCNC: 137 MMOL/L — SIGNIFICANT CHANGE UP (ref 135–145)
SODIUM SERPL-SCNC: 137 MMOL/L — SIGNIFICANT CHANGE UP (ref 135–145)
SODIUM UR-SCNC: 52 MMOL/L — SIGNIFICANT CHANGE UP
TSH SERPL-MCNC: 4.44 UU/ML — SIGNIFICANT CHANGE UP (ref 0.34–4.82)
UUN UR-MCNC: 296 MG/DL — SIGNIFICANT CHANGE UP
WBC # BLD: 4.55 K/UL — SIGNIFICANT CHANGE UP (ref 3.8–10.5)
WBC # FLD AUTO: 4.55 K/UL — SIGNIFICANT CHANGE UP (ref 3.8–10.5)

## 2023-09-08 RX ORDER — INFLUENZA VIRUS VACCINE 15; 15; 15; 15 UG/.5ML; UG/.5ML; UG/.5ML; UG/.5ML
0.7 SUSPENSION INTRAMUSCULAR ONCE
Refills: 0 | Status: DISCONTINUED | OUTPATIENT
Start: 2023-09-08 | End: 2023-09-10

## 2023-09-08 RX ORDER — SODIUM CHLORIDE 9 MG/ML
1000 INJECTION, SOLUTION INTRAVENOUS
Refills: 0 | Status: DISCONTINUED | OUTPATIENT
Start: 2023-09-08 | End: 2023-09-08

## 2023-09-08 RX ORDER — DESMOPRESSIN ACETATE 0.1 MG/1
2 TABLET ORAL ONCE
Refills: 0 | Status: COMPLETED | OUTPATIENT
Start: 2023-09-08 | End: 2023-09-08

## 2023-09-08 RX ORDER — SODIUM CHLORIDE 9 MG/ML
1000 INJECTION, SOLUTION INTRAVENOUS
Refills: 0 | Status: DISCONTINUED | OUTPATIENT
Start: 2023-09-08 | End: 2023-09-09

## 2023-09-08 RX ORDER — SODIUM CHLORIDE 9 MG/ML
250 INJECTION, SOLUTION INTRAVENOUS
Refills: 0 | Status: DISCONTINUED | OUTPATIENT
Start: 2023-09-08 | End: 2023-09-08

## 2023-09-08 RX ADMIN — SODIUM CHLORIDE 75 MILLILITER(S): 9 INJECTION, SOLUTION INTRAVENOUS at 06:54

## 2023-09-08 RX ADMIN — APIXABAN 5 MILLIGRAM(S): 2.5 TABLET, FILM COATED ORAL at 05:50

## 2023-09-08 RX ADMIN — SODIUM CHLORIDE 250 MILLILITER(S): 9 INJECTION, SOLUTION INTRAVENOUS at 05:57

## 2023-09-08 RX ADMIN — AMLODIPINE BESYLATE 5 MILLIGRAM(S): 2.5 TABLET ORAL at 05:51

## 2023-09-08 RX ADMIN — Medication 25 MILLIGRAM(S): at 21:59

## 2023-09-08 RX ADMIN — Medication 1 SPRAY(S): at 05:50

## 2023-09-08 RX ADMIN — LATANOPROST 1 DROP(S): 0.05 SOLUTION/ DROPS OPHTHALMIC; TOPICAL at 22:00

## 2023-09-08 RX ADMIN — Medication 50 MICROGRAM(S): at 05:50

## 2023-09-08 RX ADMIN — DESMOPRESSIN ACETATE 2 MICROGRAM(S): 0.1 TABLET ORAL at 15:19

## 2023-09-08 RX ADMIN — DESMOPRESSIN ACETATE 2 MICROGRAM(S): 0.1 TABLET ORAL at 06:54

## 2023-09-08 RX ADMIN — KETOTIFEN FUMARATE 1 DROP(S): 0.34 SOLUTION OPHTHALMIC at 05:51

## 2023-09-08 RX ADMIN — Medication 1 DROP(S): at 18:50

## 2023-09-08 RX ADMIN — SODIUM CHLORIDE 100 MILLILITER(S): 9 INJECTION, SOLUTION INTRAVENOUS at 05:48

## 2023-09-08 RX ADMIN — Medication 25 MILLIGRAM(S): at 05:51

## 2023-09-08 RX ADMIN — KETOTIFEN FUMARATE 1 DROP(S): 0.34 SOLUTION OPHTHALMIC at 18:53

## 2023-09-08 RX ADMIN — Medication 1 SPRAY(S): at 18:49

## 2023-09-08 RX ADMIN — SIMVASTATIN 10 MILLIGRAM(S): 20 TABLET, FILM COATED ORAL at 21:59

## 2023-09-08 RX ADMIN — Medication 1 DROP(S): at 06:54

## 2023-09-08 RX ADMIN — APIXABAN 5 MILLIGRAM(S): 2.5 TABLET, FILM COATED ORAL at 18:49

## 2023-09-08 RX ADMIN — SODIUM CHLORIDE 100 MILLILITER(S): 9 INJECTION, SOLUTION INTRAVENOUS at 12:56

## 2023-09-08 NOTE — DISCHARGE NOTE PROVIDER - NSDCFUADDINST_GEN_ALL_CORE_FT
Please have a healthy diet.   Follow up with your PCP Dr Chavez and your gastroenterologists within 2-3n days

## 2023-09-08 NOTE — PATIENT PROFILE ADULT - FALL HARM RISK - HARM RISK INTERVENTIONS

## 2023-09-08 NOTE — PATIENT PROFILE ADULT - FUNCTIONAL ASSESSMENT - BASIC MOBILITY 6.
4-calculated by average/Not able to assess (calculate score using [facility] averaging method) Hatchet Flap Text: Because of the full-thickness nature of the defect and to avoid deformity of free margin of the ala, and after full discussion of the spectrum of repair options, a dorsal nasal rotation flap was planned.  After prep and anesthesia, a Burow’s triangle was excised from the lateral portion superiorly on the nasal sidewall towards the inner canthus and an incision extended from the inferior margin of the wound across the nose and sidewall, then extended superiorly along the nasofacial sulcus and medial cheek through the inner canthus to the glabella where a back cut was made to the contralateral inner canthus.  The flap was elevated by skeletonizing the nasal root, dorsum, and sidewalls.  After hemostasis obtained, the flap was rotated into place, trimmed to fit the defect, and sutured in a layered fashion.

## 2023-09-08 NOTE — DISCHARGE NOTE PROVIDER - NSDCCPCAREPLAN_GEN_ALL_CORE_FT
PRINCIPAL DISCHARGE DIAGNOSIS  Diagnosis: Acute hyponatremia  Assessment and Plan of Treatment: You presented ED with headache and nausea vomiting. Found to have severe hyponatermia.  IV hydration was started and nephrology consulted  management was started per nephrologist recommendations   Hyponatremia improved as per your preat urine and serum sodium/ osmolality level  continue medication as prescribed and follow up with your mary doctor and nephrology  - you may need to have routine blood work for BMP to check sodium level   If you have nausea/ vomiting or altered mental status or headache, please seek for medical help      SECONDARY DISCHARGE DIAGNOSES  Diagnosis: HTN (hypertension)  Assessment and Plan of Treatment: Your blood pressure was elevated few times during this admission  monitor blood pressure daily  continue to take medications as prescribed  if you have headache, chest pain or neck pain please seek for medical help    Diagnosis: Sick headache  Assessment and Plan of Treatment:

## 2023-09-08 NOTE — PROGRESS NOTE ADULT - SUBJECTIVE AND OBJECTIVE BOX
NP Note discussed with primary attending.      Patient is a 82y old  Female who presents with a chief complaint of hyponatremia (07 Sep 2023 15:15)      INTERVAL HPI/OVERNIGHT EVENTS:     MEDICATIONS  (STANDING):  amLODIPine   Tablet 5 milliGRAM(s) Oral daily  apixaban 5 milliGRAM(s) Oral two times a day  dextrose 5%. 1000 milliLiter(s) (75 mL/Hr) IV Continuous <Continuous>  fluticasone propionate 50 MICROgram(s)/spray Nasal Spray 1 Spray(s) Both Nostrils two times a day  hydrALAZINE 25 milliGRAM(s) Oral three times a day  influenza  Vaccine (HIGH DOSE) 0.7 milliLiter(s) IntraMuscular once  ketotifen 0.025% Ophthalmic Solution 1 Drop(s) Both EYES two times a day  latanoprost 0.005% Ophthalmic Solution 1 Drop(s) Both EYES at bedtime  levothyroxine 50 MICROGram(s) Oral daily  propranolol 10 milliGRAM(s) Oral two times a day  simvastatin 10 milliGRAM(s) Oral at bedtime  timolol 0.5% Solution 1 Drop(s) Both EYES two times a day    MEDICATIONS  (PRN):  acetaminophen     Tablet .. 650 milliGRAM(s) Oral every 6 hours PRN Temp greater or equal to 38C (100.4F), Mild Pain (1 - 3)  ondansetron Injectable 4 milliGRAM(s) IV Push every 8 hours PRN Nausea and/or Vomiting      __________________________________________________  REVIEW OF SYSTEMS:    CONSTITUTIONAL: No fever,   EYES: no acute visual disturbances  NECK: No pain or stiffness  RESPIRATORY: No cough; No shortness of breath  CARDIOVASCULAR: No chest pain, no palpitations  GASTROINTESTINAL: No pain. No nausea or vomiting; No diarrhea   NEUROLOGICAL: No headache or numbness, no tremors  MUSCULOSKELETAL: No joint pain, no muscle pain  GENITOURINARY: no dysuria, no frequency, no hesitancy  PSYCHIATRY: no depression , no anxiety  ALL OTHER  ROS negative        Vital Signs Last 24 Hrs  T(C): 37.1 (08 Sep 2023 05:05), Max: 37.2 (07 Sep 2023 15:35)  T(F): 98.8 (08 Sep 2023 05:05), Max: 98.9 (07 Sep 2023 15:35)  HR: 62 (08 Sep 2023 05:05) (62 - 76)  BP: 147/77 (08 Sep 2023 05:05) (118/70 - 178/79)  BP(mean): --  RR: 15 (08 Sep 2023 05:05) (15 - 18)  SpO2: 95% (08 Sep 2023 05:05) (95% - 98%)    Parameters below as of 08 Sep 2023 05:05  Patient On (Oxygen Delivery Method): room air        ________________________________________________  PHYSICAL EXAM:  GENERAL: NAD  HEENT: Normocephalic;  conjunctivae and sclerae clear; moist mucous membranes;   NECK : supple  CHEST/LUNG: Clear to auscultation bilaterally with good air entry   HEART: S1 S2  regular; no murmurs, gallops or rubs  ABDOMEN: Soft, Nontender, Nondistended; Bowel sounds present  EXTREMITIES:Trace BLE edema.  no cyanosis;  no calf tenderness  SKIN: warm and dry; no rash  NERVOUS SYSTEM:  Awake and alert; no new deficits    _________________________________________________  LABS:                        10.0   4.55  )-----------( 281      ( 08 Sep 2023 06:51 )             30.6     09-08    137  |  x   |  x   ----------------------------<  x   x    |  x   |  x     Ca    8.7      07 Sep 2023 22:00  Phos  2.4     09-08  Mg     2.4     09-08    TPro  7.3  /  Alb  3.5  /  TBili  0.8  /  DBili  x   /  AST  32  /  ALT  20  /  AlkPhos  60  09-07      Urinalysis Basic - ( 07 Sep 2023 22:00 )    Color: x / Appearance: x / SG: x / pH: x  Gluc: 126 mg/dL / Ketone: x  / Bili: x / Urobili: x   Blood: x / Protein: x / Nitrite: x   Leuk Esterase: x / RBC: x / WBC x   Sq Epi: x / Non Sq Epi: x / Bacteria: x      CAPILLARY BLOOD GLUCOSE            RADIOLOGY & ADDITIONAL TESTS:        Consultant(s) Notes Reviewed:   YES/ No    Care Discussed with Consultants :     Plan of care was discussed with patient and /or primary care giver; all questions and concerns were addressed and care was aligned with patient's wishes.     NP Note discussed with primary attending.      Patient is a 82y old  Female who presents with a chief complaint of hyponatremia (07 Sep 2023 15:15)      INTERVAL HPI/OVERNIGHT EVENTS: Hyponatremia overcorrected.   Pt seen and examined this morning. [place]  # [id number]. Pt endorses feeling better. Tolerating po. Pt denies headache, dizziness, N/V/abdominal discomfort, chest discomfort.     MEDICATIONS  (STANDING):  amLODIPine   Tablet 5 milliGRAM(s) Oral daily  apixaban 5 milliGRAM(s) Oral two times a day  dextrose 5%. 1000 milliLiter(s) (75 mL/Hr) IV Continuous <Continuous>  fluticasone propionate 50 MICROgram(s)/spray Nasal Spray 1 Spray(s) Both Nostrils two times a day  hydrALAZINE 25 milliGRAM(s) Oral three times a day  influenza  Vaccine (HIGH DOSE) 0.7 milliLiter(s) IntraMuscular once  ketotifen 0.025% Ophthalmic Solution 1 Drop(s) Both EYES two times a day  latanoprost 0.005% Ophthalmic Solution 1 Drop(s) Both EYES at bedtime  levothyroxine 50 MICROGram(s) Oral daily  propranolol 10 milliGRAM(s) Oral two times a day  simvastatin 10 milliGRAM(s) Oral at bedtime  timolol 0.5% Solution 1 Drop(s) Both EYES two times a day    MEDICATIONS  (PRN):  acetaminophen     Tablet .. 650 milliGRAM(s) Oral every 6 hours PRN Temp greater or equal to 38C (100.4F), Mild Pain (1 - 3)  ondansetron Injectable 4 milliGRAM(s) IV Push every 8 hours PRN Nausea and/or Vomiting      __________________________________________________  REVIEW OF SYSTEMS:    CONSTITUTIONAL: No fever,   EYES: no acute visual disturbances  NECK: No pain or stiffness  RESPIRATORY: No cough; No shortness of breath  CARDIOVASCULAR: No chest pain, no palpitations  GASTROINTESTINAL: No pain. No nausea or vomiting; No diarrhea   NEUROLOGICAL: No headache or numbness, no tremors  MUSCULOSKELETAL: No joint pain, no muscle pain  GENITOURINARY: no dysuria, no frequency, no hesitancy  PSYCHIATRY: no depression , no anxiety  ALL OTHER  ROS negative        Vital Signs Last 24 Hrs  T(C): 37.1 (08 Sep 2023 05:05), Max: 37.2 (07 Sep 2023 15:35)  T(F): 98.8 (08 Sep 2023 05:05), Max: 98.9 (07 Sep 2023 15:35)  HR: 62 (08 Sep 2023 05:05) (62 - 76)  BP: 147/77 (08 Sep 2023 05:05) (118/70 - 178/79)  BP(mean): --  RR: 15 (08 Sep 2023 05:05) (15 - 18)  SpO2: 95% (08 Sep 2023 05:05) (95% - 98%)    Parameters below as of 08 Sep 2023 05:05  Patient On (Oxygen Delivery Method): room air        ________________________________________________  PHYSICAL EXAM:  GENERAL: NAD  HEENT: Normocephalic;  conjunctivae and sclerae clear; moist mucous membranes;   NECK : supple  CHEST/LUNG: Clear to auscultation bilaterally with good air entry   HEART: S1 S2  regular; no murmurs, gallops or rubs  ABDOMEN: Soft, Nontender, Nondistended; Bowel sounds present  EXTREMITIES:Trace BLE edema.  no cyanosis;  no calf tenderness  SKIN: warm and dry; no rash  NERVOUS SYSTEM:  Awake and alert; no new deficits    _________________________________________________  LABS:                        10.0   4.55  )-----------( 281      ( 08 Sep 2023 06:51 )             30.6     09-08    137  |  x   |  x   ----------------------------<  x   x    |  x   |  x     Ca    8.7      07 Sep 2023 22:00  Phos  2.4     09-08  Mg     2.4     09-08    TPro  7.3  /  Alb  3.5  /  TBili  0.8  /  DBili  x   /  AST  32  /  ALT  20  /  AlkPhos  60  09-07      Urinalysis Basic - ( 07 Sep 2023 22:00 )    Color: x / Appearance: x / SG: x / pH: x  Gluc: 126 mg/dL / Ketone: x  / Bili: x / Urobili: x   Blood: x / Protein: x / Nitrite: x   Leuk Esterase: x / RBC: x / WBC x   Sq Epi: x / Non Sq Epi: x / Bacteria: x      CAPILLARY BLOOD GLUCOSE    RADIOLOGY & ADDITIONAL TESTS:  < from: CT Head No Cont (09.07.23 @ 11:39) >  IMPRESSION: Age-appropriate involutional and ischemic gliotic changes. No   hemorrhage. No change since 8/20/2021.    < end of copied text >  < from: Xray Chest 1 View- PORTABLE-Urgent (09.07.23 @ 10:25) >  IMPRESSION: Slight CHF suggested at this time.    < end of copied text >        Consultant(s) Notes Reviewed:   YES    Care Discussed with Consultants :     Plan of care was discussed with patient and /or primary care giver; all questions and concerns were addressed and care was aligned with patient's wishes.

## 2023-09-08 NOTE — DISCHARGE NOTE PROVIDER - NSDCMRMEDTOKEN_GEN_ALL_CORE_FT
amLODIPine 5 mg oral tablet: 1 tab(s) orally once a day  DICLOFENAC SODIUM 1% GEL:   Eliquis 5 mg oral tablet: 1 tab(s) orally 2 times a day  FLUTICASONE PROP 50 MCG SPRAY:   hydrALAZINE 25 mg oral tablet: 1 tab(s) orally 2 times a day  latanoprost 0.005% ophthalmic solution: 1 drop(s) to each affected eye once a day (in the evening)  levothyroxine 50 mcg (0.05 mg) oral tablet: 1 tab(s) orally once a day  LINZESS 145 MCG CAPSULE:   LUMIGAN 0.01% EYE DROPS:   OLOPATADINE HCL 0.1% EYE DROPS:   propranolol 10 mg oral tablet: 1 tab(s) orally 2 times a day  simvastatin 10 mg oral tablet: 1 tab(s) orally once a day (at bedtime)  timolol hemihydrate 0.5% ophthalmic solution: 1 drop(s) to each affected eye 2 times a day   amLODIPine 5 mg oral tablet: 1 tab(s) orally once a day  DICLOFENAC SODIUM 1% GEL:   Eliquis 5 mg oral tablet: 1 tab(s) orally 2 times a day  hydrALAZINE 25 mg oral tablet: 1 tab(s) orally 2 times a day  latanoprost 0.005% ophthalmic solution: 1 drop(s) to each affected eye once a day (in the evening)  levothyroxine 50 mcg (0.05 mg) oral tablet: 1 tab(s) orally once a day  LINZESS 145 MCG CAPSULE:   LUMIGAN 0.01% EYE DROPS:   OLOPATADINE HCL 0.1% EYE DROPS:   propranolol 10 mg oral tablet: 1 tab(s) orally 2 times a day  simvastatin 10 mg oral tablet: 1 tab(s) orally once a day (at bedtime)  timolol hemihydrate 0.5% ophthalmic solution: 1 drop(s) to each affected eye 2 times a day

## 2023-09-08 NOTE — DISCHARGE NOTE PROVIDER - CARE PROVIDER_API CALL
Ernesto Chavez  Internal Medicine  [address], [address]  [place] [address]  Phone: [phone]  Fax: [phone]  Established Patient  Follow Up Time: 1 week

## 2023-09-08 NOTE — PROGRESS NOTE ADULT - PROBLEM SELECTOR PLAN 1
Multiple admissions for hyponatremia, was prev dc on salt tabs but as per daughter not on them because of concern for BP  Na 120 on admissions  s/p NS 600ml in ED  s/p Desmopressin x 1 for overcorrection   Resume Sodium chloride 1 g BID  1000ml fluid restriction  Continue D5W at 100ml/hr   Serial BMP  Nephro Dr. Benoit following Multiple admissions for hyponatremia, was prev dc on salt tabs but as per daughter not on them because of concern for BP  Na 120 on admissions  s/p NS 600ml in ED  s/p Desmopressin x 1 for overcorrection   Resume Sodium chloride 1 g BID  1000ml fluid restriction  Continue D5W at 100ml/hr   Serial BMP, every 4 to 6 hrs.   Nephro Dr. Benoit following

## 2023-09-08 NOTE — PROGRESS NOTE ADULT - SUBJECTIVE AND OBJECTIVE BOX
[facility] NEPHROLOGY- PROGRESS NOTE    Patient is a 81yo Montene[place]in speaking Female with HTN (on Amlodipine & Hydralazine), Afib on Eliquis and Hypothyroidism presents with n/v and headache. Pt a/w Na 120. Nephrology consulted for hyponatremia    Hospital Medications: Medications reviewed.  REVIEW OF SYSTEMS: Video Montene[place]in  # [id number]  CONSTITUTIONAL: No fevers or chills +HA resolved  RESPIRATORY: No shortness of breath  CARDIOVASCULAR: No chest pain.  GASTROINTESTINAL: No nausea, vomiting, diarrhea or abdominal pain.   VASCULAR: No bilateral lower extremity edema.     VITALS:  T(F): 98.2 (09-08-23 @ 14:19), Max: 98.8 (09-08-23 @ 05:05)  HR: 67 (09-08-23 @ 14:19)  BP: 107/67 (09-08-23 @ 14:19)  RR: 16 (09-08-23 @ 14:19)  SpO2: 97% (09-08-23 @ 14:19)  Wt(kg): --    PHYSICAL EXAM:  Constitutional: NAD  HEENT: anicteric sclera,   Respiratory: CTAB, no wheezes, rales or rhonchi  Cardiovascular: S1, S2, RRR  Gastrointestinal: BS+, soft, NT/ND  Extremities: No peripheral edema    LABS:  09-08  135 <--, 137 <--, 137 <--, 131 <--, 127 <--, 120 <--  135  |  105  |  16  ----------------------------<  123<H>  3.7   |  25  |  0.76    Ca    8.7      08 Sep 2023 15:33  Phos  2.4     09-08  Mg     2.4     09-08    TPro  7.3  /  Alb  3.5  /  TBili  0.8  /  DBili      /  AST  32  /  ALT  20  /  AlkPhos  60  09-07    Creatinine Trend: 0.76 <--, 0.83 <--, 0.92 <--, 0.55 <--, 0.69 <--                        10.0   4.55  )-----------( 281      ( 08 Sep 2023 06:51 )             30.6     Urine Studies:  Urinalysis Basic - ( 08 Sep 2023 15:33 )    Color:  / Appearance:  / SG:  / pH:   Gluc: 123 mg/dL / Ketone:   / Bili:  / Urobili:    Blood:  / Protein:  / Nitrite:    Leuk Esterase:  / RBC:  / WBC    Sq Epi:  / Non Sq Epi:  / Bacteria:       Osmolality, Random Urine: 581 mos/kg (09-08 @ 13:45)  Sodium, Random Urine: 52 mmol/L (09-08 @ 13:45)  Sodium, Random Urine: 17 mmol/L (09-07 @ 20:30)  Osmolality, Random Urine: 192 mos/kg (09-07 @ 20:30)  Potassium, Random Urine: 11 mmol/L (09-07 @ 20:30)  Creatinine, Random Urine: 68 mg/dL (09-07 @ 20:30)  Osmolality, Random Urine: 268 mos/kg (09-07 @ 11:31)  Sodium, Random Urine: 35 mmol/L (09-07 @ 11:31)    RADIOLOGY & ADDITIONAL STUDIES:

## 2023-09-08 NOTE — DISCHARGE NOTE PROVIDER - HOSPITAL COURSE
Pt is an 81 yo F from home, ambulates w cane or walker, w recurrent hyponatremia, HTN, HLD, hypothyroid, afib on eliquis, presenting with hyponatremia which accompany with headache for 1 day prior to ED visit. Per daughter BP was also elevated. Of note, pt was d/c from [facility] in 2021 with salt tabs, but as per Anna, pt has not been taking them because they were told that pt has high BP and could not take salt tabs.   Nephrology consulted. 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 IV hydration started per recommendations. Admission Serum Na 120, Serum osm 249, Urine osm 268 and urine na 35. Repeat urine sodium, urine osmolality and serum osmolality after IV hydration and NaCl 1g P.O BID -----  TSH and AM Cortisol were checked and result showed -----    BP was elevated on admission, resume home meds with are amlodipine, Hydralazine and propanolol.. Monitored  BP and controlled well.   For headache, CT head was obtained and no significant findings.       Incomplete 9/8      Pt is an 81 yo F from home, ambulates w cane or walker, w recurrent hyponatremia, HTN, hypothyroid, afib on Eliquis, presenting with hyponatremia which accompany with headache for 1 day prior to ED visit. Per daughter BP was also elevated. Of note, [facility], but as per family, pt has not been taking them because they were told that pt has high BP and could not take salt tabs.   Nephrology was consulted. 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 Intravenous hydration started per recommendations with admission serum Na 120, Serum osm 249, Urine osm 268 and urine na 35. Repeat urine sodium, urine osmolality and serum osmolality after IV hydration and NaCl 1g oral, twice a day  TSH and AM Cortisol were evaluated by nephrology.   BP was elevated on admission and home medications resumed with Amlodipine, Hydralazine and Propanolol.  Blood Pressure was closely monitored and controlled well.   For headache, CT head was obtained and no significant findings.   Hyponatremia resolved. Discharge plan discussed with patient and family and in agreement with close PCP Dr Chavez follow up.   Discharge planning as per multidisciplinary team and attending Dr Chavez Pt is an 83 yo F from home, ambulates w cane or walker, w recurrent hyponatremia, HTN, hypothyroid, afib on Eliquis, presenting with hyponatremia which accompany with headache for 1 day prior to ED visit. Per daughter BP was also elevated. Of note, pt was d/c from [facility] in 2021 with salt tabs, but as per family, pt has not been taking them because they were told that pt has high BP and could not take salt tabs.   Nephrology was consulted. 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 Intravenous hydration started per recommendations with admission serum Na 120, Serum osm 249, Urine osm 268 and urine na 35. Repeat urine sodium, urine osmolality and serum osmolality after IV hydration and NaCl 1g oral, twice a day  TSH and AM Cortisol were evaluated by nephrology.   BP was elevated on admission and home medications resumed with Amlodipine, Hydralazine and Propanolol.  Blood Pressure was closely monitored and controlled well.   For headache, CT head was obtained and no significant findings.   Hyponatremia resolved with gentle hydration and diet.   Discharge plan discussed with patient and family and in agreement with close PCP Dr Chavez follow up.   Discharge planning as per multidisciplinary team and attending Dr Chavez

## 2023-09-09 LAB
ANION GAP SERPL CALC-SCNC: 7 MMOL/L — SIGNIFICANT CHANGE UP (ref 5–17)
BUN SERPL-MCNC: 15 MG/DL — SIGNIFICANT CHANGE UP (ref 7–18)
CALCIUM SERPL-MCNC: 8.2 MG/DL — LOW (ref 8.4–10.5)
CHLORIDE SERPL-SCNC: 99 MMOL/L — SIGNIFICANT CHANGE UP (ref 96–108)
CO2 SERPL-SCNC: 24 MMOL/L — SIGNIFICANT CHANGE UP (ref 22–31)
CREAT SERPL-MCNC: 0.61 MG/DL — SIGNIFICANT CHANGE UP (ref 0.5–1.3)
EGFR: 88 ML/MIN/1.73M2 — SIGNIFICANT CHANGE UP
GLUCOSE SERPL-MCNC: 103 MG/DL — HIGH (ref 70–99)
HCT VFR BLD CALC: 32.6 % — LOW (ref 34.5–45)
HGB BLD-MCNC: 10.7 G/DL — LOW (ref 11.5–15.5)
MAGNESIUM SERPL-MCNC: 2 MG/DL — SIGNIFICANT CHANGE UP (ref 1.6–2.6)
MCHC RBC-ENTMCNC: 27.4 PG — SIGNIFICANT CHANGE UP (ref 27–34)
MCHC RBC-ENTMCNC: 32.8 GM/DL — SIGNIFICANT CHANGE UP (ref 32–36)
MCV RBC AUTO: 83.4 FL — SIGNIFICANT CHANGE UP (ref 80–100)
NRBC # BLD: 0 /100 WBCS — SIGNIFICANT CHANGE UP (ref 0–0)
PLATELET # BLD AUTO: 305 K/UL — SIGNIFICANT CHANGE UP (ref 150–400)
POTASSIUM SERPL-MCNC: 4 MMOL/L — SIGNIFICANT CHANGE UP (ref 3.5–5.3)
POTASSIUM SERPL-SCNC: 4 MMOL/L — SIGNIFICANT CHANGE UP (ref 3.5–5.3)
RBC # BLD: 3.91 M/UL — SIGNIFICANT CHANGE UP (ref 3.8–5.2)
RBC # FLD: 14.1 % — SIGNIFICANT CHANGE UP (ref 10.3–14.5)
SODIUM SERPL-SCNC: 130 MMOL/L — LOW (ref 135–145)
WBC # BLD: 6.31 K/UL — SIGNIFICANT CHANGE UP (ref 3.8–10.5)
WBC # FLD AUTO: 6.31 K/UL — SIGNIFICANT CHANGE UP (ref 3.8–10.5)

## 2023-09-09 RX ORDER — SENNA PLUS 8.6 MG/1
2 TABLET ORAL AT BEDTIME
Refills: 0 | Status: DISCONTINUED | OUTPATIENT
Start: 2023-09-09 | End: 2023-09-10

## 2023-09-09 RX ORDER — POLYETHYLENE GLYCOL 3350 17 G/17G
17 POWDER, FOR SOLUTION ORAL DAILY
Refills: 0 | Status: DISCONTINUED | OUTPATIENT
Start: 2023-09-09 | End: 2023-09-10

## 2023-09-09 RX ADMIN — Medication 650 MILLIGRAM(S): at 11:04

## 2023-09-09 RX ADMIN — APIXABAN 5 MILLIGRAM(S): 2.5 TABLET, FILM COATED ORAL at 05:55

## 2023-09-09 RX ADMIN — Medication 25 MILLIGRAM(S): at 05:55

## 2023-09-09 RX ADMIN — Medication 650 MILLIGRAM(S): at 10:34

## 2023-09-09 RX ADMIN — Medication 50 MICROGRAM(S): at 05:54

## 2023-09-09 RX ADMIN — Medication 1 SPRAY(S): at 05:56

## 2023-09-09 RX ADMIN — Medication 1 SPRAY(S): at 17:35

## 2023-09-09 RX ADMIN — SIMVASTATIN 10 MILLIGRAM(S): 20 TABLET, FILM COATED ORAL at 21:33

## 2023-09-09 RX ADMIN — Medication 1 DROP(S): at 05:56

## 2023-09-09 RX ADMIN — SENNA PLUS 2 TABLET(S): 8.6 TABLET ORAL at 21:33

## 2023-09-09 RX ADMIN — AMLODIPINE BESYLATE 5 MILLIGRAM(S): 2.5 TABLET ORAL at 05:55

## 2023-09-09 RX ADMIN — Medication 650 MILLIGRAM(S): at 17:34

## 2023-09-09 RX ADMIN — Medication 25 MILLIGRAM(S): at 21:33

## 2023-09-09 RX ADMIN — KETOTIFEN FUMARATE 1 DROP(S): 0.34 SOLUTION OPHTHALMIC at 05:56

## 2023-09-09 RX ADMIN — Medication 650 MILLIGRAM(S): at 18:04

## 2023-09-09 RX ADMIN — POLYETHYLENE GLYCOL 3350 17 GRAM(S): 17 POWDER, FOR SOLUTION ORAL at 12:07

## 2023-09-09 RX ADMIN — KETOTIFEN FUMARATE 1 DROP(S): 0.34 SOLUTION OPHTHALMIC at 17:36

## 2023-09-09 RX ADMIN — LATANOPROST 1 DROP(S): 0.05 SOLUTION/ DROPS OPHTHALMIC; TOPICAL at 21:36

## 2023-09-09 RX ADMIN — Medication 1 DROP(S): at 17:35

## 2023-09-09 RX ADMIN — APIXABAN 5 MILLIGRAM(S): 2.5 TABLET, FILM COATED ORAL at 17:34

## 2023-09-09 NOTE — PROGRESS NOTE ADULT - ASSESSMENT
Patient is a 83yo Mauritanian speaking Female with HTN (on Amlodipine & Hydralazine), Afib on Eliquis and Hypothyroidism presents with n/v and headache. Pt a/w Na 120. Nephrology consulted for hyponatremia    1. Hyponatremia- hypotonic hyponatremia. Urine studies consistent with SIADH. Now with overcorrection; will d/c salt tabs. c/w D5 @ 100ml/hr. s/p DDAVp 2mcg x2. Will liberalize fluid in diet due to overcorrection.   c/w Ensure bid. TSH wnl. f/u AM Cortisol. Monitor serial BMP's and UO. Monitor serum sodium.  2. Essential HTN- BP controlled. Continue with current anti-hypertensive medications. Monitor BP.  3. Hypothyroidism- TSH wnl. c/w levothyroxine   4. Head-aches- CT head neg. Avoid NSAIDS. Defer to primary team    [facility] NEPHROLOGY  Manuel Clay M.D.  Mark Slaughter D.O.  Marj Benoit M.D.  MD Shamika Anderson, MSN, ANP-C    Telephone: [phone]  Facsimile: [phone]    [address] [place] [address], [address]  [place] [address]  
Patient is a 81yo Polish speaking Female with HTN (on Amlodipine & Hydralazine), Afib on Eliquis and Hypothyroidism presents with n/v and headache. Pt a/w Na 120. Nephrology consulted for hyponatremia    1. Hyponatremia- hypotonic hyponatremia. Urine studies consistent with SIADH. Pt with overcorrection with IVF and NaCl tabs, then s/p D5 @ 100ml/hr. s/p DDAVp 2mcg x2. Now Na+ appropriate.  Will allow pt to eat and drink but not give any salt tabs or medications for hypo- or hypenatremia for now.  Diet liberalized due to overcorrection.   c/w Ensure bid. TSH wnl. AM Cortisol wnl. Monitor serial BMP's and UO. Monitor serum sodium.  2. Essential HTN- BP controlled. Continue with current anti-hypertensive medications. Monitor BP.  3. Hypothyroidism- TSH wnl. c/w levothyroxine   4. Head-aches- CT head neg. Avoid NSAIDS. Defer to primary team    [facility] NEPHROLOGY  Manuel Clay M.D.  Mark Slaughter D.O.  Marj Benoit M.D.  MD Shamika Anderson, MSN, ANP-C    Telephone: [phone]  Facsimile: [phone] [facility], [address]  [place] [address]  
81 yo F from home w HTN, recurrent hyponatremia requiring multiple hospitalizations, HLD, a fib on eliquis, hypothyroid, glaucoma, admitted for hyponatremia.

## 2023-09-09 NOTE — PROGRESS NOTE ADULT - SUBJECTIVE AND OBJECTIVE BOX
INTERVAL HPI/OVERNIGHT EVENTS:  Patient seen,stable,no acute issues  VITAL SIGNS:  T(F): 97.7 (09-09-23 @ 05:34)  HR: 68 (09-09-23 @ 05:34)  BP: 149/79 (09-09-23 @ 05:34)  RR: 16 (09-09-23 @ 05:34)  SpO2: 97% (09-09-23 @ 05:34)  Wt(kg): --    PHYSICAL EXAM:  awake  Constitutional:  Eyes:  ENMT:perrla  Neck:  Respiratory:clear  Cardiovascular:s1s2,m-none  Gastrointestinal:soft,bs pos  Extremities:  Vascular:  Neurological:no foocal deficit  Musculoskeletal:    MEDICATIONS  (STANDING):  amLODIPine   Tablet 5 milliGRAM(s) Oral daily  apixaban 5 milliGRAM(s) Oral two times a day  fluticasone propionate 50 MICROgram(s)/spray Nasal Spray 1 Spray(s) Both Nostrils two times a day  hydrALAZINE 25 milliGRAM(s) Oral three times a day  influenza  Vaccine (HIGH DOSE) 0.7 milliLiter(s) IntraMuscular once  ketotifen 0.025% Ophthalmic Solution 1 Drop(s) Both EYES two times a day  latanoprost 0.005% Ophthalmic Solution 1 Drop(s) Both EYES at bedtime  levothyroxine 50 MICROGram(s) Oral daily  polyethylene glycol 3350 17 Gram(s) Oral daily  propranolol 10 milliGRAM(s) Oral two times a day  senna 2 Tablet(s) Oral at bedtime  simvastatin 10 milliGRAM(s) Oral at bedtime  timolol 0.5% Solution 1 Drop(s) Both EYES two times a day    MEDICATIONS  (PRN):  acetaminophen     Tablet .. 650 milliGRAM(s) Oral every 6 hours PRN Temp greater or equal to 38C (100.4F), Mild Pain (1 - 3)  ondansetron Injectable 4 milliGRAM(s) IV Push every 8 hours PRN Nausea and/or Vomiting      Allergies    No Known Allergies    Intolerances        LABS:                        10.7   6.31  )-----------( 305      ( 09 Sep 2023 07:50 )             32.6     09-09    130<L>  |  99  |  15  ----------------------------<  103<H>  4.0   |  24  |  0.61    Ca    8.2<L>      09 Sep 2023 07:50  Phos  2.4     09-08  Mg     2.0     09-09        Urinalysis Basic - ( 09 Sep 2023 07:50 )    Color: x / Appearance: x / SG: x / pH: x  Gluc: 103 mg/dL / Ketone: x  / Bili: x / Urobili: x   Blood: x / Protein: x / Nitrite: x   Leuk Esterase: x / RBC: x / WBC x   Sq Epi: x / Non Sq Epi: x / Bacteria: x        RADIOLOGY & ADDITIONAL TESTS:      Assessment and Plan:   · Assessment	  81 yo F from home w HTN, recurrent hyponatremia requiring multiple hospitalizations, HLD, a fib on eliquis, hypothyroid, glaucoma, admitted for hyponatremia.         Problem/Plan - 1:  ·  Problem: Hyponatremia-gradually improving  Na 130 today  s/p Desmopressin x 1 for overcorrection   Resume Sodium chloride 1 g BID  1000ml fluid restriction  Continue D5W at 100ml/hr   Serial BMP,  Nephro Dr. Benoit following.     Problem/Plan - 2:  ·  Problem: Chronic atrial fibrillation.   ·  Plan: Ventricular rate controlled.  Continue propanolol   Continue apixaban.     Problem/Plan - 3:  ·  Problem: HTN (hypertension).   ·  Plan: Controlled  Continue hydralazine, amlodipine, propranolol.     Problem/Plan - 4:  ·  Problem: Glaucoma.   ·  Plan: Continue ophthalmic solutions: timolol, ketotifen, latanoprost  Safety precautions.     Problem/Plan - 5:  ·  Problem: Hypothyroid.   ·  Plan: Continue levothyroxine.     Problem/Plan - 6:  ·  Problem: Preventive measure.   ·  Plan: Pt is on apixaban.     Problem/Plan - 7:  ·  Problem: Discharge planning issues.   ·  Plan: Pt from home.   Dispo: hopeful discharge in 24 hrs.

## 2023-09-09 NOTE — PROGRESS NOTE ADULT - SUBJECTIVE AND OBJECTIVE BOX
[facility] NEPHROLOGY- PROGRESS NOTE    Patient is a 83yo Moldovan speaking Female with HTN (on Amlodipine & Hydralazine), Afib on Eliquis and Hypothyroidism presents with n/v and headache. Pt a/w Na 120. Nephrology consulted for hyponatremia    Hospital Medications: Medications reviewed.  REVIEW OF SYSTEMS: Video Moldovan  # [id number]  CONSTITUTIONAL: No fevers or chills +HA resolved  RESPIRATORY: No shortness of breath  CARDIOVASCULAR: No chest pain.  GASTROINTESTINAL: No nausea, vomiting, diarrhea or abdominal pain.   VASCULAR: No bilateral lower extremity edema.     VITALS:  T(F): 98.2 (09-09-23 @ 14:17), Max: 98.3 (09-08-23 @ 20:26)  HR: 68 (09-09-23 @ 14:17)  BP: 112/65 (09-09-23 @ 14:17)  RR: 17 (09-09-23 @ 14:17)  SpO2: 97% (09-09-23 @ 14:17)      PHYSICAL EXAM:  Constitutional: NAD  HEENT: anicteric sclera,   Respiratory: CTAB, no wheezes, rales or rhonchi  Cardiovascular: S1, S2, RRR  Gastrointestinal: BS+, soft, NT/ND  Extremities: No peripheral edema      LABS:  09-09  130 <--, 134 <--, 135 <--, 137 <--, 137 <--, 131 <--, 127 <--  130<L>  |  99  |  15  ----------------------------<  103<H>  4.0   |  24  |  0.61    Ca    8.2<L>      09 Sep 2023 07:50  Phos  2.4     09-08  Mg     2.0     09-09  Cortisol AM, Serum . (09.08.23 @ 08:25)    Cortisol AM, Serum: 12.3 ug/dL        Creatinine Trend: 0.61 <--, 0.82 <--, 0.76 <--, 0.83 <--, 0.92 <--, 0.55 <--, 0.69 <--                        10.7   6.31  )-----------( 305      ( 09 Sep 2023 07:50 )             32.6     Urine Studies:  Urinalysis Basic - ( 09 Sep 2023 07:50 )    Color:  / Appearance:  / SG:  / pH:   Gluc: 103 mg/dL / Ketone:   / Bili:  / Urobili:    Blood:  / Protein:  / Nitrite:    Leuk Esterase:  / RBC:  / WBC    Sq Epi:  / Non Sq Epi:  / Bacteria:       Osmolality, Random Urine: 581 mos/kg (09-08 @ 13:45)  Sodium, Random Urine: 52 mmol/L (09-08 @ 13:45)  Sodium, Random Urine: 17 mmol/L (09-07 @ 20:30)  Osmolality, Random Urine: 192 mos/kg (09-07 @ 20:30)  Potassium, Random Urine: 11 mmol/L (09-07 @ 20:30)  Creatinine, Random Urine: 68 mg/dL (09-07 @ 20:30)  Osmolality, Random Urine: 268 mos/kg (09-07 @ 11:31)  Sodium, Random Urine: 35 mmol/L (09-07 @ 11:31)

## 2023-09-10 VITALS
RESPIRATION RATE: 18 BRPM | HEART RATE: 79 BPM | DIASTOLIC BLOOD PRESSURE: 70 MMHG | TEMPERATURE: 97 F | SYSTOLIC BLOOD PRESSURE: 105 MMHG | OXYGEN SATURATION: 97 %

## 2023-09-10 LAB
ANION GAP SERPL CALC-SCNC: 4 MMOL/L — LOW (ref 5–17)
BUN SERPL-MCNC: 20 MG/DL — HIGH (ref 7–18)
CALCIUM SERPL-MCNC: 8.9 MG/DL — SIGNIFICANT CHANGE UP (ref 8.4–10.5)
CHLORIDE SERPL-SCNC: 101 MMOL/L — SIGNIFICANT CHANGE UP (ref 96–108)
CO2 SERPL-SCNC: 26 MMOL/L — SIGNIFICANT CHANGE UP (ref 22–31)
CREAT SERPL-MCNC: 0.65 MG/DL — SIGNIFICANT CHANGE UP (ref 0.5–1.3)
EGFR: 87 ML/MIN/1.73M2 — SIGNIFICANT CHANGE UP
GLUCOSE SERPL-MCNC: 94 MG/DL — SIGNIFICANT CHANGE UP (ref 70–99)
POTASSIUM SERPL-MCNC: 4.1 MMOL/L — SIGNIFICANT CHANGE UP (ref 3.5–5.3)
POTASSIUM SERPL-SCNC: 4.1 MMOL/L — SIGNIFICANT CHANGE UP (ref 3.5–5.3)
SODIUM SERPL-SCNC: 131 MMOL/L — LOW (ref 135–145)

## 2023-09-10 PROCEDURE — 84540 ASSAY OF URINE/UREA-N: CPT

## 2023-09-10 PROCEDURE — 36415 COLL VENOUS BLD VENIPUNCTURE: CPT

## 2023-09-10 PROCEDURE — 82570 ASSAY OF URINE CREATININE: CPT

## 2023-09-10 PROCEDURE — 94640 AIRWAY INHALATION TREATMENT: CPT

## 2023-09-10 PROCEDURE — 82962 GLUCOSE BLOOD TEST: CPT

## 2023-09-10 PROCEDURE — 83935 ASSAY OF URINE OSMOLALITY: CPT

## 2023-09-10 PROCEDURE — 83605 ASSAY OF LACTIC ACID: CPT

## 2023-09-10 PROCEDURE — 82803 BLOOD GASES ANY COMBINATION: CPT

## 2023-09-10 PROCEDURE — 83930 ASSAY OF BLOOD OSMOLALITY: CPT

## 2023-09-10 PROCEDURE — 84100 ASSAY OF PHOSPHORUS: CPT

## 2023-09-10 PROCEDURE — 93005 ELECTROCARDIOGRAM TRACING: CPT

## 2023-09-10 PROCEDURE — 82533 TOTAL CORTISOL: CPT

## 2023-09-10 PROCEDURE — 84295 ASSAY OF SERUM SODIUM: CPT

## 2023-09-10 PROCEDURE — 99285 EMERGENCY DEPT VISIT HI MDM: CPT | Mod: 25

## 2023-09-10 PROCEDURE — 70450 CT HEAD/BRAIN W/O DYE: CPT | Mod: MA

## 2023-09-10 PROCEDURE — 85027 COMPLETE CBC AUTOMATED: CPT

## 2023-09-10 PROCEDURE — 81001 URINALYSIS AUTO W/SCOPE: CPT

## 2023-09-10 PROCEDURE — 83735 ASSAY OF MAGNESIUM: CPT

## 2023-09-10 PROCEDURE — 84300 ASSAY OF URINE SODIUM: CPT

## 2023-09-10 PROCEDURE — 84443 ASSAY THYROID STIM HORMONE: CPT

## 2023-09-10 PROCEDURE — 84133 ASSAY OF URINE POTASSIUM: CPT

## 2023-09-10 PROCEDURE — 84484 ASSAY OF TROPONIN QUANT: CPT

## 2023-09-10 PROCEDURE — 71045 X-RAY EXAM CHEST 1 VIEW: CPT

## 2023-09-10 PROCEDURE — 84156 ASSAY OF PROTEIN URINE: CPT

## 2023-09-10 PROCEDURE — 82330 ASSAY OF CALCIUM: CPT

## 2023-09-10 PROCEDURE — 85025 COMPLETE CBC W/AUTO DIFF WBC: CPT

## 2023-09-10 PROCEDURE — 80053 COMPREHEN METABOLIC PANEL: CPT

## 2023-09-10 PROCEDURE — 84132 ASSAY OF SERUM POTASSIUM: CPT

## 2023-09-10 PROCEDURE — 80048 BASIC METABOLIC PNL TOTAL CA: CPT

## 2023-09-10 RX ORDER — FLUTICASONE PROPIONATE 50 MCG
0 SPRAY, SUSPENSION NASAL
Qty: 0 | Refills: 0 | DISCHARGE

## 2023-09-10 RX ADMIN — AMLODIPINE BESYLATE 5 MILLIGRAM(S): 2.5 TABLET ORAL at 06:52

## 2023-09-10 RX ADMIN — KETOTIFEN FUMARATE 1 DROP(S): 0.34 SOLUTION OPHTHALMIC at 06:30

## 2023-09-10 RX ADMIN — Medication 1 SPRAY(S): at 06:27

## 2023-09-10 RX ADMIN — Medication 1 DROP(S): at 06:28

## 2023-09-10 RX ADMIN — APIXABAN 5 MILLIGRAM(S): 2.5 TABLET, FILM COATED ORAL at 06:25

## 2023-09-10 RX ADMIN — POLYETHYLENE GLYCOL 3350 17 GRAM(S): 17 POWDER, FOR SOLUTION ORAL at 11:57

## 2023-09-10 RX ADMIN — Medication 25 MILLIGRAM(S): at 06:26

## 2023-09-10 RX ADMIN — Medication 50 MICROGRAM(S): at 06:27

## 2023-09-10 NOTE — DISCHARGE NOTE NURSING/CASE MANAGEMENT/SOCIAL WORK - PATIENT PORTAL LINK FT
You can access the FollowMyHealth Patient Portal offered by [facility] by registering at the following website: [email or web address]. By joining [employer]’s FollowMyHealth portal, you will also be able to view your health information using other applications (apps) compatible with our system.

## 2023-09-10 NOTE — DISCHARGE NOTE NURSING/CASE MANAGEMENT/SOCIAL WORK - NSDCPEFALRISK_GEN_ALL_CORE
For information on Fall & Injury Prevention, visit: [email or web address] OR  [email or web address] OR  https://www.cdc.gov/steadi/patient.html

## 2023-09-10 NOTE — PROGRESS NOTE ADULT - SUBJECTIVE AND OBJECTIVE BOX
INTERVAL HPI/OVERNIGHT EVENTS:  Patient seen,doing better  VITAL SIGNS:  T(F): 97.3 (09-10-23 @ 05:17)  HR: 73 (09-10-23 @ 05:17)  BP: 104/68 (09-10-23 @ 05:17)  RR: 17 (09-10-23 @ 05:17)  SpO2: 94% (09-10-23 @ 05:17)  Wt(kg): --    PHYSICAL EXAM:  awake  Constitutional:  Eyes:  ENMT:perrla  Neck:  Respiratory:clear  Cardiovascular:s1s2  Gastrointestinal:soft,bs pos  Extremities:  Vascular:  Neurological:no focal deficit  Musculoskeletal:    MEDICATIONS  (STANDING):  amLODIPine   Tablet 5 milliGRAM(s) Oral daily  apixaban 5 milliGRAM(s) Oral two times a day  fluticasone propionate 50 MICROgram(s)/spray Nasal Spray 1 Spray(s) Both Nostrils two times a day  hydrALAZINE 25 milliGRAM(s) Oral three times a day  influenza  Vaccine (HIGH DOSE) 0.7 milliLiter(s) IntraMuscular once  ketotifen 0.025% Ophthalmic Solution 1 Drop(s) Both EYES two times a day  latanoprost 0.005% Ophthalmic Solution 1 Drop(s) Both EYES at bedtime  levothyroxine 50 MICROGram(s) Oral daily  polyethylene glycol 3350 17 Gram(s) Oral daily  propranolol 10 milliGRAM(s) Oral two times a day  senna 2 Tablet(s) Oral at bedtime  simvastatin 10 milliGRAM(s) Oral at bedtime  timolol 0.5% Solution 1 Drop(s) Both EYES two times a day    MEDICATIONS  (PRN):  acetaminophen     Tablet .. 650 milliGRAM(s) Oral every 6 hours PRN Temp greater or equal to 38C (100.4F), Mild Pain (1 - 3)  ondansetron Injectable 4 milliGRAM(s) IV Push every 8 hours PRN Nausea and/or Vomiting      Allergies    No Known Allergies    Intolerances        LABS:                        10.7   6.31  )-----------( 305      ( 09 Sep 2023 07:50 )             32.6     09-10    131<L>  |  101  |  20<H>  ----------------------------<  94  4.1   |  26  |  0.65    Ca    8.9      10 Sep 2023 07:28  Mg     2.0     09-09        Urinalysis Basic - ( 10 Sep 2023 07:28 )    Color: x / Appearance: x / SG: x / pH: x  Gluc: 94 mg/dL / Ketone: x  / Bili: x / Urobili: x   Blood: x / Protein: x / Nitrite: x   Leuk Esterase: x / RBC: x / WBC x   Sq Epi: x / Non Sq Epi: x / Bacteria: x        RADIOLOGY & ADDITIONAL TESTS:      Assessment and Plan:   · Assessment	  81 yo F from home w HTN, recurrent hyponatremia requiring multiple hospitalizations, HLD, a fib on eliquis, hypothyroid, glaucoma, admitted for hyponatremia.         Problem/Plan - 1:  ·  Problem: Hyponatremia-gradually improving  Na 131 today  s/p Desmopressin x 1 for overcorrection   Resume Sodium chloride 1 g BID  Continue D5W at 100ml/hr   Serial BMP,  Nephro Dr. Benoit following.  d/ c planning     Problem/Plan - 2:  ·  Problem: Chronic atrial fibrillation.   ·  Plan: Ventricular rate controlled.  Continue propanolol   Continue apixaban.     Problem/Plan - 3:  ·  Problem: HTN (hypertension).   ·  Plan: Controlled  Continue hydralazine, amlodipine, propranolol.     Problem/Plan - 4:  ·  Problem: Glaucoma.   ·  Plan: Continue ophthalmic solutions: timolol, ketotifen, latanoprost  Safety precautions.     Problem/Plan - 5:  ·  Problem: Hypothyroid.   ·  Plan: Continue levothyroxine.     Problem/Plan - 6:  ·  Problem: Preventive measure.   ·  Plan: Pt is on apixaban.     Problem/Plan - 7:  ·  Problem: Discharge planning issues.   ·  Plan: Pt from home.   Dispo: hopeful discharge in 24 hrs.

## 2023-10-07 NOTE — PROGRESS NOTE ADULT - ATTENDING COMMENTS
Potassium 2.9 IMP: This is an 80 year old woman  with  HTN, HLD , Hypothyroidism, afib ? ( Patient on  amiodarone and Eliquis)  was brought to ED by daughter for progressive weakness and lethargy. Patient being admitted to ICU for sever hyponatremia.    Assessment:     - Severe Hyponatremia ( Hypovolemic Vs SIADH)  - Anemia  - UTI  - HTN  - Afib  - Hypothyroidism        Plan ;  -Continue to monitor mental /neurological status  -No sedation   -Diet   -Neph eval noted   -Na is better   -Continue ivf   -Dvt/Gi proph  -DVT/Gi prophy  -Monitor BMP q4h  -Antibx   -F/u cultures .

## 2024-03-20 NOTE — ED ADULT TRIAGE NOTE - LANGUAGE ASSISTANCE NEEDED
Subclinical hypothyroidism  Chronic, has been stable.  Patient has been feeling more fatigued and lethargic in the last few months however.  Weight has been at a standstill, no gain, but in the last.  Will recheck thyroid hormone and notify patient results and any further recommendations.  Continue current dosage without any changes.  - TSH with free T4 reflex; Future    Acute rhinitis  Acute symptoms over the last week accompanied by conjunctivitis.  Recommend starting over-the-counter Flonase nasal spray, 2 sprays each nostril daily.  Reviewed proper administration instructions with patient.  Also recommend starting over-the-counter antihistamine such as Claritin or Zyrtec once daily.  Would recommend follow-up if symptoms not proving or worsening.    Pain of left middle finger  Increased pain of left middle finger over the course of the last several weeks.  Noting more at night, waking with significant stiffness and pain.  He denies any significant swelling or redness.  Denies any autoimmune family history.  Mild tenderness of proximal interphalangeal joint on exam.  Denies any trauma or injury that she is aware of.  Will obtain baseline x-ray to further evaluate.  In the interim, recommend ice and/or heat, moving through range of motion.  - XR Finger Left G/E 2 Views; Future       Preventive Care Advice   This is general advice given by our system to help you stay healthy. However, your care team may have specific advice just for you. Please talk to your care team about your preventive care needs.  Nutrition  Eat 5 or more servings of fruits and vegetables each day.  Try wheat bread, brown rice and whole grain pasta (instead of white bread, rice, and pasta).  Get enough calcium and vitamin D. Check the label on foods and aim for 100% of the RDA (recommended daily allowance).  Lifestyle  Exercise at least 150 minutes each week   (30 minutes a day, 5 days a week).  Do muscle strengthening activities 2 days a week.  These help control your weight and prevent disease.  No smoking.  Wear sunscreen to prevent skin cancer.  Have a dental exam and cleaning every 6 months.  Yearly exams  See your health care team every year to talk about:  Any changes in your health.  Any medicines your care team has prescribed.  Preventive care, family planning, and ways to prevent chronic diseases.  Shots (vaccines)   HPV shots (up to age 26), if you've never had them before.  Hepatitis B shots (up to age 59), if you've never had them before.  COVID-19 shot: Get this shot when it's due.  Flu shot: Get a flu shot every year.  Tetanus shot: Get a tetanus shot every 10 years.  Pneumococcal, hepatitis A, and RSV shots: Ask your care team if you need these based on your risk.  Shingles shot (for age 50 and up).  General health tests  Diabetes screening:  Starting at age 35, Get screened for diabetes at least every 3 years.  If you are younger than age 35, ask your care team if you should be screened for diabetes.  Cholesterol test: At age 39, start having a cholesterol test every 5 years, or more often if advised.  Bone density scan (DEXA): At age 50, ask your care team if you should have this scan for osteoporosis (brittle bones).  Hepatitis C: Get tested at least once in your life.  STIs (sexually transmitted infections)  Before age 24: Ask your care team if you should be screened for STIs.  After age 24: Get screened for STIs if you're at risk. You are at risk for STIs (including HIV) if:  You are sexually active with more than one person.  You don't use condoms every time.  You or a partner was diagnosed with a sexually transmitted infection.  If you are at risk for HIV, ask about PrEP medicine to prevent HIV.  Get tested for HIV at least once in your life, whether you are at risk for HIV or not.  Cancer screening tests  Cervical cancer screening: If you have a cervix, begin getting regular cervical cancer screening tests at age 21. Most people who  have regular screenings with normal results can stop after age 65. Talk about this with your provider.  Breast cancer scan (mammogram): If you've ever had breasts, begin having regular mammograms starting at age 40. This is a scan to check for breast cancer.  Colon cancer screening: It is important to start screening for colon cancer at age 45.  Have a colonoscopy test every 10 years (or more often if you're at risk) Or, ask your provider about stool tests like a FIT test every year or Cologuard test every 3 years.  To learn more about your testing options, visit: https://www.[employer]/238962.pdf.  For help making a decision, visit: https://bit.[employer]/la43535.  Prostate cancer screening test: If you have a prostate and are age 55 to 69, ask your provider if you would benefit from a yearly prostate cancer screening test.  Lung cancer screening: If you are a current or former smoker age 50 to 80, ask your care team if ongoing lung cancer screenings are right for you.  For informational purposes only. Not to replace the advice of your health care provider. Copyright   2023 [employer]. All rights reserved. Clinically reviewed by the [facility] Transitions Program. [employer] [id number] - REV 01/24.     No-Patient/Caregiver offered and refused free interpretation services.

## 2024-09-04 NOTE — ED ADULT TRIAGE NOTE - BP NONINVASIVE DIASTOLIC (MM HG)
Biopsy site to  right    breast clear with Exofin dry and intact. No firmness or swelling noted at or around biopsy site. Denies pain. Ice pack with protective covering applied to biopsy site. Discharge instructions discussed with understanding voiced by patient. Copies provided to patient. No distress noted. To home via personal vehicle.     77

## 2024-10-28 NOTE — CONSULT NOTE ADULT - SUBJECTIVE AND OBJECTIVE BOX
[facility] NEPHROLOGY- CONSULTATION NOTE   # [id number]    80y British Female with history of below presents with vomiting and tremors. Nephrology consulted for hyponatremia.    Pt recently admitted for hyponatremia due to chronic back pain which resolved. Pt then received steroid shot and started on tramadol and baclofen. Pt developed multiple episode of vomiting with chills and came to ER  As per pt, she has had multiple episodes of hyponatremia in the past. Pt c/o Rt hip pain but denies any current n/v/d.     Patient found to have hyponatremia on labs and was given 1L NS in ER. Pt initially with low urine osm. Patient subsequently noted to have  improving serum Na with overcorrection for which patient was given D5W and DDAVP.  Patient remains NPO.   Pt denies any NSAID or diuretic use    REVIEW OF SYSTEMS:  Gen: no changes in weight, + weakness  HEENT: no rhinorrhea  Neck: no sore throat  Cards: no chest pain  Resp: no dyspnea  GI: + nausea and vomiting resolved, no diarrhea  : no dysuria or hematuria,   Vascular: no LE edema  Derm: no rashes  Neuro: no numbness/tingling    No Known Allergies      Home Medications Reviewed  Hospital Medications: Reviewed      PAST MEDICAL & SURGICAL HISTORY:  HTN (hypertension)    Atrial fibrillation    Hypothyroidism, adult    No significant past surgical history        FAMILY HISTORY:      SOCIAL HISTORY:  Denies toxic substance use     VITALS:  T(F): 97.9 (21 @ 10:26), Max: 98 (21 @ 07:32)  HR: 62 (21 @ 16:00)  BP: 119/68 (21 @ 16:00)  RR: 18 (21 @ 16:00)  SpO2: 97% (21 @ 16:00)  Wt(kg): --     @ 07:01  -   @ 16:44  --------------------------------------------------------  IN: 1440 mL / OUT: 2410 mL / NET: -970 mL      Height (cm): 154.9 ( @ 05:00)  Weight (kg): 66.7 ( @ 10:26)  BMI (kg/m2): 27.8 ( @ 10:26)  BSA (m2): 1.66 ( @ 10:26)      PHYSICAL EXAM:  Gen: NAD, calm  HEENT: MMM  Neck: no JVD  Cards: RRR, +S1/S2, no M/G/R  Resp: CTA B/L  GI: soft, NT/ND, NABS  : no CVA tenderness, + ventura with dilute urine  Vascular: no LE edema B/L  Derm: no rashes  Neuro: non-focal      LABS:    123 <--, 124 <--, 120 <--, 116 <--  123<L>  |  90<L>  |  7   ----------------------------<  135<H>  4.0   |  25  |  0.66    Ca    8.5      16 Sep 2021 14:42  Mg     1.6         TPro  7.4  /  Alb  3.8  /  TBili  0.6  /  DBili      /  AST  16  /  ALT  25  /  AlkPhos  80      Creatinine Trend: 0.66 <--, 0.52 <--, 0.50 <--, 0.58 <--                        11.0   6.94  )-----------( 360      ( 16 Sep 2021 05:43 )             32.1     Urine Studies:  Urinalysis Basic - ( 16 Sep 2021 08:21 )    Color: Yellow / Appearance: Clear / S.010 / pH:   Gluc:  / Ketone: Trace  / Bili: Negative / Urobili: Negative   Blood:  / Protein: Negative / Nitrite: Negative   Leuk Esterase: Negative / RBC:  / WBC    Sq Epi:  / Non Sq Epi:  / Bacteria:       Sodium, Random Urine: 37 mmol/L ( @ 14:43)  Osmolality, Random Urine: 365 mos/kg ( @ 14:42)  Osmolality, Random Urine: 213 mos/kg ( @ 08:21)  Sodium, Random Urine: 56 mmol/L ( @ 08:21)  Creatinine, Random Urine: <13 mg/dL ( @ 08:21)  < from: Xray Chest 1 View- PORTABLE-Urgent (Xray Chest 1 View- PORTABLE-Urgent .) (21 @ 06:32) >    EXAM:  XR CHEST PORTABLE URGENT 1V                            PROCEDURE DATE:  2021          INTERPRETATION:  Weakness.    Single AP view. Prior 2021    The heart is magnified by AP film. There is no parenchymal consolidation, congestion, or pleural effusion bilaterally.  The trachea is midline. Osseous structures are normal for age.    IMPRESSION: NO ACTIVE INTRATHORACIC PATHOLOGY.    --- End of Report ---            NYLA RODRÍGUEZ MD; Attending Radiologist  This document has been electronically signed. Sep 16 2021  9:58AM    < end of copied text >        
Patient is a 80y old  Female who presents with a chief complaint of Hyponatremia (17 Sep 2021 11:32)      HPI:  Patient is a 80y old  Female who presents with a chief complaint of nausea and vomiting    INTERVAL HPI/OVERNIGHT EVENTS:  Patient is an 80F from home, lives with daughter, with PMHx of HTN, HLD, hypothyroidism, atrial fibrillation on Eliquis, recurrent hyponatremia, presenting with chief complaint of vomiting and tremors. Patient is AAOx2 and unable to provide adequate history, hence, majority of history obtained from patient's daughter, Anna. Patient admitted for hyponatremia ~1month ago, thought to be due to hyper ADH. Due to chronic back pain, patient obtained a steroid shot on  and was started on tramadol and baclofen. Day prior to admission, patient noted with severe headache and took 600mg IBUProfen and additionally, patient's blood pressure noted to be very labile, with episodes of hypertensive urgency and hypotension. This AM, patient noted with multiple episodes of vomiting and chills, and was subsequently brought to the hospital. No diuretics, No reported decreased po intake Denied any fever, chills, cough. No chest pain, palpitations, SOB. No dysuria, hematuria, abdominal pain. Patient endorsing some nausea. Hx from chart review and icu team / left message with daughter.     In the ED, patient noted to have a sodium concentration of 116. 1L NS was given and sodium improved to 120,    ICU Vital Signs Last 24 Hrs  T(C): 36.7 (16 Sep 2021 07:32), Max: 36.7 (16 Sep 2021 07:32)  T(F): 98 (16 Sep 2021 07:32), Max: 98 (16 Sep 2021 07:32)  HR: 56 (16 Sep 2021 07:32) (56 - 56)  BP: 159/69 (16 Sep 2021 07:32) (147/83 - 159/69)  BP(mean): --  ABP: --  ABP(mean): --  RR: 17 (16 Sep 2021 07:32) (17 - 18)  SpO2: 95% (16 Sep 2021 07:32) (95% - 97%)    I&O's Summary        LABS:                        11.0   6.94  )-----------( 360      ( 16 Sep 2021 05:43 )             32.1     09-16    120<LL>  |  88<L>  |  8   ----------------------------<  114<H>  3.4<L>   |  23  |  0.50    Ca    8.4      16 Sep 2021 07:24  Mg     1.6         TPro  7.4  /  Alb  3.8  /  TBili  0.6  /  DBili  x   /  AST  16  /  ALT  25  /  AlkPhos  80        Urinalysis Basic - ( 16 Sep 2021 08:21 )    Color: Yellow / Appearance: Clear / S.010 / pH: x  Gluc: x / Ketone: Trace  / Bili: Negative / Urobili: Negative   Blood: x / Protein: Negative / Nitrite: Negative   Leuk Esterase: Negative / RBC: x / WBC x   Sq Epi: x / Non Sq Epi: x / Bacteria: x      CAPILLARY BLOOD GLUCOSE      POCT Blood Glucose.: 143 mg/dL (16 Sep 2021 05:30)        RADIOLOGY & ADDITIONAL TESTS:    Consultant(s) Notes Reviewed:  [x ] YES  [ ] NO    MEDICATIONS  (STANDING):    MEDICATIONS  (PRN):      PHYSICAL EXAM:  GENERAL: elderly, well developed, well nourished, no acute distress,   HEAD:  Atraumatic, Normocephalic  EYES: EOMI, PERRLA, conjunctiva and sclera clear, no nystagmus  ENT: dry mucous membranes  NECK: Supple, No JVD, Normal thyroid, no enlarged nodes  NERVOUS SYSTEM:  Alert & Oriented X2, to person and place. No focal deficits  CHEST/LUNG: clear to auscultation bilaterally, no rhonchi wheezes   HEART: +s1.s2 regular  ABDOMEN: Soft, Nontender, Nondistended; Bowel sounds present  EXTREMITIES:  2+ Peripheral Pulses, No clubbing, cyanosis, or edema  LYMPH: No lymphadenopathy noted  SKIN: No rashes or lesions    Care Discussed with Consultants/Other Providers [ x] YES  [ ] NO (16 Sep 2021 09:20)      PAST MEDICAL & SURGICAL HISTORY:  HTN (hypertension)    Atrial fibrillation    Hypothyroidism, adult    No significant past surgical history           MEDICATIONS  (STANDING):  amLODIPine   Tablet 5 milliGRAM(s) Oral daily  apixaban 5 milliGRAM(s) Oral every 12 hours  chlorhexidine 2% Cloths 1 Application(s) Topical <User Schedule>  hydrALAZINE 25 milliGRAM(s) Oral two times a day  latanoprost 0.005% Ophthalmic Solution 1 Drop(s) Both EYES at bedtime  levothyroxine 50 MICROGram(s) Oral daily  polyethylene glycol 3350 17 Gram(s) Oral two times a day  propranolol 10 milliGRAM(s) Oral two times a day  senna 2 Tablet(s) Oral at bedtime  simvastatin 10 milliGRAM(s) Oral at bedtime  sodium chloride 1 Gram(s) Oral two times a day  timolol 0.5% Solution 1 Drop(s) Both EYES two times a day    MEDICATIONS  (PRN):  ondansetron Injectable 4 milliGRAM(s) IV Push every 8 hours PRN Nausea and/or Vomiting      FAMILY HISTORY:      SOCIAL HISTORY:      REVIEW OF SYSTEMS: NA	        Vital Signs Last 24 Hrs  T(C): 36.2 (17 Sep 2021 12:00), Max: 37.1 (16 Sep 2021 19:36)  T(F): 97.2 (17 Sep 2021 12:00), Max: 98.8 (16 Sep 2021 19:36)  HR: 59 (17 Sep 2021 14:00) (51 - 66)  BP: 132/70 (17 Sep 2021 13:00) (95/49 - 158/76)  BP(mean): 86 (17 Sep 2021 13:00) (63 - 101)  RR: 18 (17 Sep 2021 14:00) (12 - 20)  SpO2: 97% (17 Sep 2021 14:00) (94% - 98%)      Constitutional:    HEENT: nad    Neck:  No JVD, bruits or thyromegaly    Respiratory:  Clear without rales or rhonchi    Cardiovascular:  RR without murmur, rub or gallop.    Gastrointestinal: Soft without hepatosplenomegaly.    Extremities: without cyanosis, clubbing or edema.    Neurological:  Oriented   x  2    . No gross sensory or motor defects.        LABS:                        11.1   5.77  )-----------( 366      ( 17 Sep 2021 03:52 )             32.4     -    121<L>  |  88<L>  |  9   ----------------------------<  106<H>  4.1   |  25  |  0.70    Ca    9.0      17 Sep 2021 10:59  Phos  3.4       Mg     1.8     -    TPro  6.9  /  Alb  3.2<L>  /  TBili  0.5  /  DBili  x   /  AST  16  /  ALT  23  /  AlkPhos  76  -    CARDIAC MARKERS ( 16 Sep 2021 05:43 )  <0.015 ng/mL / x     / x     / x     / x            Urinalysis Basic - ( 16 Sep 2021 08:21 )    Color: Yellow / Appearance: Clear / S.010 / pH: x  Gluc: x / Ketone: Trace  / Bili: Negative / Urobili: Negative   Blood: x / Protein: Negative / Nitrite: Negative   Leuk Esterase: Negative / RBC: x / WBC x   Sq Epi: x / Non Sq Epi: x / Bacteria: x      CAPILLARY BLOOD GLUCOSE          RADIOLOGY & ADDITIONAL STUDIES:
done

## 2025-01-22 NOTE — ED ADULT TRIAGE NOTE - NS_BHTRGSOURCE_ED_A_ED_FT
Earnestine Mark  tolerated treatment well with no complications.      Earnestine Mark is aware to follow up with ordering provider after last infusion.     AVS printed and given to Earnestine Mark:  Yes          joe

## 2025-06-16 NOTE — PROGRESS NOTE ADULT - REASON FOR ADMISSION
Severe Hyponatremia
.